# Patient Record
Sex: FEMALE | Race: BLACK OR AFRICAN AMERICAN | NOT HISPANIC OR LATINO | Employment: FULL TIME | URBAN - METROPOLITAN AREA
[De-identification: names, ages, dates, MRNs, and addresses within clinical notes are randomized per-mention and may not be internally consistent; named-entity substitution may affect disease eponyms.]

---

## 2017-01-26 ENCOUNTER — OFFICE VISIT (OUTPATIENT)
Dept: URGENT CARE | Facility: CLINIC | Age: 57
End: 2017-01-26

## 2017-01-26 ENCOUNTER — TRANSCRIBE ORDERS (OUTPATIENT)
Dept: URGENT CARE | Facility: CLINIC | Age: 57
End: 2017-01-26

## 2017-01-26 ENCOUNTER — APPOINTMENT (OUTPATIENT)
Dept: LAB | Facility: CLINIC | Age: 57
End: 2017-01-26
Attending: EMERGENCY MEDICINE

## 2017-01-26 DIAGNOSIS — R35.0 FREQUENCY OF MICTURITION: ICD-10-CM

## 2017-01-26 PROCEDURE — 87186 SC STD MICRODIL/AGAR DIL: CPT

## 2017-01-26 PROCEDURE — 87086 URINE CULTURE/COLONY COUNT: CPT

## 2017-01-26 PROCEDURE — 87077 CULTURE AEROBIC IDENTIFY: CPT

## 2017-01-26 PROCEDURE — 81002 URINALYSIS NONAUTO W/O SCOPE: CPT

## 2017-01-26 PROCEDURE — 99204 OFFICE O/P NEW MOD 45 MIN: CPT

## 2017-01-29 LAB — BACTERIA UR CULT: NORMAL

## 2019-04-15 ENCOUNTER — APPOINTMENT (EMERGENCY)
Dept: RADIOLOGY | Facility: HOSPITAL | Age: 59
End: 2019-04-15

## 2019-04-15 ENCOUNTER — APPOINTMENT (EMERGENCY)
Dept: CT IMAGING | Facility: HOSPITAL | Age: 59
End: 2019-04-15

## 2019-04-15 ENCOUNTER — HOSPITAL ENCOUNTER (EMERGENCY)
Facility: HOSPITAL | Age: 59
Discharge: HOME | End: 2019-04-15
Attending: EMERGENCY MEDICINE | Admitting: EMERGENCY MEDICINE

## 2019-04-15 VITALS
TEMPERATURE: 97.7 F | HEART RATE: 92 BPM | WEIGHT: 179 LBS | DIASTOLIC BLOOD PRESSURE: 77 MMHG | BODY MASS INDEX: 32.94 KG/M2 | SYSTOLIC BLOOD PRESSURE: 160 MMHG | OXYGEN SATURATION: 94 % | HEIGHT: 62 IN | RESPIRATION RATE: 19 BRPM
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
## 2019-04-15 DIAGNOSIS — S42.202A CLOSED FRACTURE OF LEFT PROXIMAL HUMERUS: ICD-10-CM

## 2019-04-15 PROCEDURE — 29125 APPL SHORT ARM SPLINT STATIC: CPT | Performed by: EMERGENCY MEDICINE

## 2019-04-15 PROCEDURE — 73060 X-RAY EXAM OF HUMERUS: CPT

## 2019-04-15 PROCEDURE — 99285 EMERGENCY DEPT VISIT HI MDM: CPT

## 2019-04-15 PROCEDURE — 96375 TX/PRO/DX INJ NEW DRUG ADDON: CPT

## 2019-04-15 PROCEDURE — 99284 EMERGENCY DEPT VISIT MOD MDM: CPT | Performed by: EMERGENCY MEDICINE

## 2019-04-15 PROCEDURE — 96374 THER/PROPH/DIAG INJ IV PUSH: CPT

## 2019-04-15 PROCEDURE — 71260 CT THORAX DX C+: CPT

## 2019-04-15 PROCEDURE — 72125 CT NECK SPINE W/O DYE: CPT

## 2019-04-15 RX ORDER — MORPHINE SULFATE 4 MG/ML
4 INJECTION, SOLUTION INTRAMUSCULAR; INTRAVENOUS ONCE
Status: COMPLETED | OUTPATIENT
Start: 2019-04-15 | End: 2019-04-15

## 2019-04-15 RX ORDER — FENTANYL CITRATE 50 UG/ML
50 INJECTION, SOLUTION INTRAMUSCULAR; INTRAVENOUS ONCE
Status: COMPLETED | OUTPATIENT
Start: 2019-04-15 | End: 2019-04-15

## 2019-04-15 RX ORDER — LIDOCAINE 50 MG/G
1 PATCH TOPICAL ONCE
Status: DISCONTINUED | OUTPATIENT
Start: 2019-04-15 | End: 2019-04-15 | Stop reason: HOSPADM

## 2019-04-15 RX ORDER — MORPHINE SULFATE 15 MG/1
15 TABLET ORAL EVERY 4 HOURS PRN
Qty: 20 TABLET | Refills: 0 | Status: SHIPPED | OUTPATIENT
Start: 2019-04-15 | End: 2019-04-20

## 2019-04-15 RX ADMIN — MORPHINE SULFATE 4 MG: 4 INJECTION INTRAVENOUS at 18:02

## 2019-04-15 RX ADMIN — FENTANYL CITRATE 50 MCG: 50 INJECTION INTRAMUSCULAR; INTRAVENOUS at 17:05

## 2019-04-15 RX ADMIN — LIDOCAINE 1 PATCH: 50 PATCH TOPICAL at 18:02

## 2019-04-15 RX ADMIN — IOHEXOL 85 ML: 350 INJECTION, SOLUTION INTRAVENOUS at 16:20

## 2019-04-16 ENCOUNTER — DOCUMENTATION (OUTPATIENT)
Dept: ICU | Facility: HOSPITAL | Age: 59
End: 2019-04-16

## 2020-05-21 ENCOUNTER — TELEPHONE (OUTPATIENT)
Dept: ADMINISTRATIVE | Facility: OTHER | Age: 60
End: 2020-05-21

## 2020-05-21 ENCOUNTER — OFFICE VISIT (OUTPATIENT)
Dept: FAMILY MEDICINE CLINIC | Facility: CLINIC | Age: 60
End: 2020-05-21

## 2020-05-21 VITALS
TEMPERATURE: 99.5 F | HEIGHT: 62 IN | OXYGEN SATURATION: 98 % | BODY MASS INDEX: 34.54 KG/M2 | SYSTOLIC BLOOD PRESSURE: 150 MMHG | WEIGHT: 187.7 LBS | DIASTOLIC BLOOD PRESSURE: 84 MMHG | HEART RATE: 101 BPM

## 2020-05-21 DIAGNOSIS — F41.8 OTHER SPECIFIED ANXIETY DISORDERS: ICD-10-CM

## 2020-05-21 DIAGNOSIS — I10 ESSENTIAL HYPERTENSION: ICD-10-CM

## 2020-05-21 DIAGNOSIS — Z13.31 DEPRESSION SCREENING NEGATIVE: ICD-10-CM

## 2020-05-21 DIAGNOSIS — Z12.39 SCREENING FOR BREAST CANCER: ICD-10-CM

## 2020-05-21 DIAGNOSIS — E66.09 CLASS 1 OBESITY DUE TO EXCESS CALORIES WITHOUT SERIOUS COMORBIDITY WITH BODY MASS INDEX (BMI) OF 34.0 TO 34.9 IN ADULT: ICD-10-CM

## 2020-05-21 DIAGNOSIS — Z13.0 SCREENING FOR DEFICIENCY ANEMIA: ICD-10-CM

## 2020-05-21 DIAGNOSIS — Z76.89 ENCOUNTER TO ESTABLISH CARE: Primary | ICD-10-CM

## 2020-05-21 DIAGNOSIS — Z13.1 SCREENING FOR DIABETES MELLITUS: ICD-10-CM

## 2020-05-21 DIAGNOSIS — Z13.29 SCREENING FOR THYROID DISORDER: ICD-10-CM

## 2020-05-21 DIAGNOSIS — Z13.220 SCREENING FOR HYPERLIPIDEMIA: ICD-10-CM

## 2020-05-21 PROBLEM — F41.9 ANXIETY AND DEPRESSION: Status: ACTIVE | Noted: 2020-05-21

## 2020-05-21 PROBLEM — F32.A ANXIETY AND DEPRESSION: Status: ACTIVE | Noted: 2020-05-21

## 2020-05-21 PROCEDURE — 3008F BODY MASS INDEX DOCD: CPT | Performed by: NURSE PRACTITIONER

## 2020-05-21 PROCEDURE — 1036F TOBACCO NON-USER: CPT | Performed by: NURSE PRACTITIONER

## 2020-05-21 PROCEDURE — 3079F DIAST BP 80-89 MM HG: CPT | Performed by: NURSE PRACTITIONER

## 2020-05-21 PROCEDURE — 3077F SYST BP >= 140 MM HG: CPT | Performed by: NURSE PRACTITIONER

## 2020-05-21 PROCEDURE — 99203 OFFICE O/P NEW LOW 30 MIN: CPT | Performed by: NURSE PRACTITIONER

## 2020-05-21 RX ORDER — ESCITALOPRAM OXALATE 10 MG/1
10 TABLET ORAL DAILY
Qty: 60 TABLET | Refills: 0 | Status: SHIPPED | OUTPATIENT
Start: 2020-05-21 | End: 2020-05-21

## 2020-05-21 RX ORDER — OLMESARTAN MEDOXOMIL 20 MG/1
20 TABLET ORAL DAILY
Qty: 30 TABLET | Refills: 0 | Status: SHIPPED | OUTPATIENT
Start: 2020-05-21 | End: 2020-06-19 | Stop reason: SDUPTHER

## 2020-05-21 RX ORDER — ESCITALOPRAM OXALATE 10 MG/1
10 TABLET ORAL DAILY
Qty: 60 TABLET | Refills: 0
Start: 2020-05-21 | End: 2020-06-02

## 2020-05-21 RX ORDER — DIPHENHYDRAMINE HCL 25 MG
25 CAPSULE ORAL EVERY 6 HOURS PRN
COMMUNITY

## 2020-06-02 ENCOUNTER — OFFICE VISIT (OUTPATIENT)
Dept: FAMILY MEDICINE CLINIC | Facility: CLINIC | Age: 60
End: 2020-06-02

## 2020-06-02 VITALS
WEIGHT: 189 LBS | SYSTOLIC BLOOD PRESSURE: 150 MMHG | TEMPERATURE: 99.7 F | OXYGEN SATURATION: 100 % | HEART RATE: 101 BPM | HEIGHT: 62 IN | DIASTOLIC BLOOD PRESSURE: 96 MMHG | BODY MASS INDEX: 34.78 KG/M2

## 2020-06-02 DIAGNOSIS — F41.8 OTHER SPECIFIED ANXIETY DISORDERS: ICD-10-CM

## 2020-06-02 PROCEDURE — 1036F TOBACCO NON-USER: CPT | Performed by: NURSE PRACTITIONER

## 2020-06-02 PROCEDURE — 3080F DIAST BP >= 90 MM HG: CPT | Performed by: NURSE PRACTITIONER

## 2020-06-02 PROCEDURE — 3008F BODY MASS INDEX DOCD: CPT | Performed by: NURSE PRACTITIONER

## 2020-06-02 PROCEDURE — 3077F SYST BP >= 140 MM HG: CPT | Performed by: NURSE PRACTITIONER

## 2020-06-02 PROCEDURE — 99213 OFFICE O/P EST LOW 20 MIN: CPT | Performed by: NURSE PRACTITIONER

## 2020-06-02 RX ORDER — BUSPIRONE HYDROCHLORIDE 5 MG/1
5 TABLET ORAL 2 TIMES DAILY
Qty: 60 TABLET | Refills: 0 | Status: SHIPPED | OUTPATIENT
Start: 2020-06-02 | End: 2020-06-19 | Stop reason: SDUPTHER

## 2020-06-08 ENCOUNTER — APPOINTMENT (OUTPATIENT)
Dept: LAB | Facility: CLINIC | Age: 60
End: 2020-06-08
Payer: COMMERCIAL

## 2020-06-08 DIAGNOSIS — Z13.29 SCREENING FOR THYROID DISORDER: ICD-10-CM

## 2020-06-08 DIAGNOSIS — Z13.220 SCREENING FOR HYPERLIPIDEMIA: ICD-10-CM

## 2020-06-08 DIAGNOSIS — Z13.1 SCREENING FOR DIABETES MELLITUS: ICD-10-CM

## 2020-06-08 DIAGNOSIS — Z13.0 SCREENING FOR DEFICIENCY ANEMIA: ICD-10-CM

## 2020-06-08 LAB
BASOPHILS # BLD AUTO: 0.01 THOUSANDS/ΜL (ref 0–0.1)
BASOPHILS NFR BLD AUTO: 0 % (ref 0–1)
EOSINOPHIL # BLD AUTO: 0.01 THOUSAND/ΜL (ref 0–0.61)
EOSINOPHIL NFR BLD AUTO: 0 % (ref 0–6)
ERYTHROCYTE [DISTWIDTH] IN BLOOD BY AUTOMATED COUNT: 15.4 % (ref 11.6–15.1)
HCT VFR BLD AUTO: 36 % (ref 34.8–46.1)
HGB BLD-MCNC: 11.6 G/DL (ref 11.5–15.4)
IMM GRANULOCYTES # BLD AUTO: 0.03 THOUSAND/UL (ref 0–0.2)
IMM GRANULOCYTES NFR BLD AUTO: 1 % (ref 0–2)
LYMPHOCYTES # BLD AUTO: 1.27 THOUSANDS/ΜL (ref 0.6–4.47)
LYMPHOCYTES NFR BLD AUTO: 26 % (ref 14–44)
MCH RBC QN AUTO: 31.2 PG (ref 26.8–34.3)
MCHC RBC AUTO-ENTMCNC: 32.2 G/DL (ref 31.4–37.4)
MCV RBC AUTO: 97 FL (ref 82–98)
MONOCYTES # BLD AUTO: 0.76 THOUSAND/ΜL (ref 0.17–1.22)
MONOCYTES NFR BLD AUTO: 16 % (ref 4–12)
NEUTROPHILS # BLD AUTO: 2.75 THOUSANDS/ΜL (ref 1.85–7.62)
NEUTS SEG NFR BLD AUTO: 57 % (ref 43–75)
NRBC BLD AUTO-RTO: 0 /100 WBCS
PLATELET # BLD AUTO: 329 THOUSANDS/UL (ref 149–390)
PMV BLD AUTO: 9.5 FL (ref 8.9–12.7)
RBC # BLD AUTO: 3.72 MILLION/UL (ref 3.81–5.12)
TSH SERPL DL<=0.05 MIU/L-ACNC: 3.13 UIU/ML (ref 0.36–3.74)
WBC # BLD AUTO: 4.83 THOUSAND/UL (ref 4.31–10.16)

## 2020-06-08 PROCEDURE — 36415 COLL VENOUS BLD VENIPUNCTURE: CPT

## 2020-06-08 PROCEDURE — 84443 ASSAY THYROID STIM HORMONE: CPT

## 2020-06-08 PROCEDURE — 85025 COMPLETE CBC W/AUTO DIFF WBC: CPT

## 2020-06-09 ENCOUNTER — APPOINTMENT (OUTPATIENT)
Dept: LAB | Facility: CLINIC | Age: 60
End: 2020-06-09
Payer: COMMERCIAL

## 2020-06-09 LAB
ALBUMIN SERPL BCP-MCNC: 3.7 G/DL (ref 3.5–5)
ALP SERPL-CCNC: 86 U/L (ref 46–116)
ALT SERPL W P-5'-P-CCNC: 24 U/L (ref 12–78)
ANION GAP SERPL CALCULATED.3IONS-SCNC: 13 MMOL/L (ref 4–13)
AST SERPL W P-5'-P-CCNC: 19 U/L (ref 5–45)
BILIRUB SERPL-MCNC: 0.33 MG/DL (ref 0.2–1)
BUN SERPL-MCNC: 56 MG/DL (ref 5–25)
CALCIUM SERPL-MCNC: 9 MG/DL (ref 8.3–10.1)
CHLORIDE SERPL-SCNC: 102 MMOL/L (ref 100–108)
CHOLEST SERPL-MCNC: 204 MG/DL (ref 50–200)
CO2 SERPL-SCNC: 12 MMOL/L (ref 21–32)
CREAT SERPL-MCNC: 3.67 MG/DL (ref 0.6–1.3)
GFR SERPL CREATININE-BSD FRML MDRD: 15 ML/MIN/1.73SQ M
GLUCOSE P FAST SERPL-MCNC: 135 MG/DL (ref 65–99)
HDLC SERPL-MCNC: 68 MG/DL
LDLC SERPL CALC-MCNC: 98 MG/DL (ref 0–100)
POTASSIUM SERPL-SCNC: 3.4 MMOL/L (ref 3.5–5.3)
PROT SERPL-MCNC: 8.3 G/DL (ref 6.4–8.2)
SODIUM SERPL-SCNC: 127 MMOL/L (ref 136–145)
TRIGL SERPL-MCNC: 190 MG/DL

## 2020-06-09 PROCEDURE — 36415 COLL VENOUS BLD VENIPUNCTURE: CPT

## 2020-06-09 PROCEDURE — 80053 COMPREHEN METABOLIC PANEL: CPT

## 2020-06-09 PROCEDURE — 80061 LIPID PANEL: CPT

## 2020-06-13 DIAGNOSIS — I10 ESSENTIAL HYPERTENSION: ICD-10-CM

## 2020-06-15 RX ORDER — OLMESARTAN MEDOXOMIL 20 MG/1
TABLET ORAL
Qty: 30 TABLET | Refills: 0 | OUTPATIENT
Start: 2020-06-15

## 2020-06-16 DIAGNOSIS — F41.8 OTHER SPECIFIED ANXIETY DISORDERS: ICD-10-CM

## 2020-06-17 DIAGNOSIS — F41.8 OTHER SPECIFIED ANXIETY DISORDERS: ICD-10-CM

## 2020-06-17 RX ORDER — BUSPIRONE HYDROCHLORIDE 5 MG/1
TABLET ORAL
Qty: 180 TABLET | Refills: 1 | OUTPATIENT
Start: 2020-06-17

## 2020-06-19 ENCOUNTER — OFFICE VISIT (OUTPATIENT)
Dept: FAMILY MEDICINE CLINIC | Facility: CLINIC | Age: 60
End: 2020-06-19

## 2020-06-19 VITALS
SYSTOLIC BLOOD PRESSURE: 138 MMHG | OXYGEN SATURATION: 98 % | TEMPERATURE: 99 F | DIASTOLIC BLOOD PRESSURE: 90 MMHG | HEIGHT: 62 IN | WEIGHT: 185 LBS | HEART RATE: 80 BPM | BODY MASS INDEX: 34.04 KG/M2

## 2020-06-19 DIAGNOSIS — I10 ESSENTIAL HYPERTENSION: Primary | ICD-10-CM

## 2020-06-19 DIAGNOSIS — N18.4 KIDNEY DISEASE, CHRONIC, STAGE IV (SEVERE, EGFR 15-29 ML/MIN) (HCC): ICD-10-CM

## 2020-06-19 DIAGNOSIS — F41.9 ANXIETY AND DEPRESSION: ICD-10-CM

## 2020-06-19 DIAGNOSIS — R79.89 HIGH SERUM CREATINE: ICD-10-CM

## 2020-06-19 DIAGNOSIS — R79.9 ELEVATED BUN: ICD-10-CM

## 2020-06-19 DIAGNOSIS — R73.03 PREDIABETES: ICD-10-CM

## 2020-06-19 DIAGNOSIS — R94.4 DECREASED GLOMERULAR FILTRATION RATE (GFR): ICD-10-CM

## 2020-06-19 DIAGNOSIS — I10 ESSENTIAL HYPERTENSION: ICD-10-CM

## 2020-06-19 DIAGNOSIS — F41.8 OTHER SPECIFIED ANXIETY DISORDERS: ICD-10-CM

## 2020-06-19 DIAGNOSIS — R73.01 ELEVATED FASTING GLUCOSE: ICD-10-CM

## 2020-06-19 DIAGNOSIS — F32.A ANXIETY AND DEPRESSION: ICD-10-CM

## 2020-06-19 LAB — SL AMB POCT HEMOGLOBIN AIC: 6 (ref ?–6.5)

## 2020-06-19 PROCEDURE — 3075F SYST BP GE 130 - 139MM HG: CPT | Performed by: NURSE PRACTITIONER

## 2020-06-19 PROCEDURE — 3080F DIAST BP >= 90 MM HG: CPT | Performed by: NURSE PRACTITIONER

## 2020-06-19 PROCEDURE — 99213 OFFICE O/P EST LOW 20 MIN: CPT | Performed by: NURSE PRACTITIONER

## 2020-06-19 PROCEDURE — 1036F TOBACCO NON-USER: CPT | Performed by: NURSE PRACTITIONER

## 2020-06-19 PROCEDURE — 83036 HEMOGLOBIN GLYCOSYLATED A1C: CPT | Performed by: NURSE PRACTITIONER

## 2020-06-19 PROCEDURE — 3008F BODY MASS INDEX DOCD: CPT | Performed by: NURSE PRACTITIONER

## 2020-06-19 RX ORDER — OLMESARTAN MEDOXOMIL 20 MG/1
20 TABLET ORAL DAILY
Qty: 90 TABLET | Refills: 1 | Status: SHIPPED | OUTPATIENT
Start: 2020-06-19 | End: 2020-06-19 | Stop reason: SDUPTHER

## 2020-06-19 RX ORDER — BUSPIRONE HYDROCHLORIDE 5 MG/1
5 TABLET ORAL 2 TIMES DAILY
Qty: 60 TABLET | Refills: 0 | Status: SHIPPED | OUTPATIENT
Start: 2020-06-19 | End: 2020-07-27 | Stop reason: SDUPTHER

## 2020-06-19 RX ORDER — OLMESARTAN MEDOXOMIL 20 MG/1
20 TABLET ORAL DAILY
Qty: 90 TABLET | Refills: 1 | Status: SHIPPED | OUTPATIENT
Start: 2020-06-19 | End: 2020-08-28 | Stop reason: SDUPTHER

## 2020-06-24 ENCOUNTER — TELEPHONE (OUTPATIENT)
Dept: FAMILY MEDICINE CLINIC | Facility: CLINIC | Age: 60
End: 2020-06-24

## 2020-07-13 DIAGNOSIS — F41.8 OTHER SPECIFIED ANXIETY DISORDERS: ICD-10-CM

## 2020-07-13 RX ORDER — ESCITALOPRAM OXALATE 10 MG/1
TABLET ORAL
Qty: 60 TABLET | Refills: 0 | OUTPATIENT
Start: 2020-07-13

## 2020-07-15 ENCOUNTER — TRANSCRIBE ORDERS (OUTPATIENT)
Dept: LAB | Facility: CLINIC | Age: 60
End: 2020-07-15

## 2020-07-15 ENCOUNTER — APPOINTMENT (OUTPATIENT)
Dept: LAB | Facility: CLINIC | Age: 60
End: 2020-07-15
Payer: COMMERCIAL

## 2020-07-15 DIAGNOSIS — R79.89 HIGH SERUM CREATINE: ICD-10-CM

## 2020-07-15 DIAGNOSIS — R79.9 ELEVATED BUN: ICD-10-CM

## 2020-07-15 DIAGNOSIS — R94.4 DECREASED GLOMERULAR FILTRATION RATE (GFR): ICD-10-CM

## 2020-07-15 LAB
ALBUMIN SERPL BCP-MCNC: 4 G/DL (ref 3.5–5)
ALP SERPL-CCNC: 99 U/L (ref 46–116)
ALT SERPL W P-5'-P-CCNC: 34 U/L (ref 12–78)
ANION GAP SERPL CALCULATED.3IONS-SCNC: 7 MMOL/L (ref 4–13)
AST SERPL W P-5'-P-CCNC: 102 U/L (ref 5–45)
BILIRUB SERPL-MCNC: 0.8 MG/DL (ref 0.2–1)
BUN SERPL-MCNC: 9 MG/DL (ref 5–25)
CALCIUM SERPL-MCNC: 8.7 MG/DL (ref 8.3–10.1)
CHLORIDE SERPL-SCNC: 105 MMOL/L (ref 100–108)
CO2 SERPL-SCNC: 28 MMOL/L (ref 21–32)
CREAT SERPL-MCNC: 0.8 MG/DL (ref 0.6–1.3)
CREAT UR-MCNC: 849 MG/DL
GFR SERPL CREATININE-BSD FRML MDRD: 94 ML/MIN/1.73SQ M
GLUCOSE P FAST SERPL-MCNC: 165 MG/DL (ref 65–99)
MICROALBUMIN UR-MCNC: 308 MG/L (ref 0–20)
MICROALBUMIN/CREAT 24H UR: 36 MG/G CREATININE (ref 0–30)
POTASSIUM SERPL-SCNC: 3.6 MMOL/L (ref 3.5–5.3)
PROT SERPL-MCNC: 7.6 G/DL (ref 6.4–8.2)
SODIUM SERPL-SCNC: 140 MMOL/L (ref 136–145)

## 2020-07-15 PROCEDURE — 82570 ASSAY OF URINE CREATININE: CPT | Performed by: NURSE PRACTITIONER

## 2020-07-15 PROCEDURE — 36415 COLL VENOUS BLD VENIPUNCTURE: CPT

## 2020-07-15 PROCEDURE — 80053 COMPREHEN METABOLIC PANEL: CPT

## 2020-07-15 PROCEDURE — 82043 UR ALBUMIN QUANTITATIVE: CPT | Performed by: NURSE PRACTITIONER

## 2020-07-27 DIAGNOSIS — F41.8 OTHER SPECIFIED ANXIETY DISORDERS: ICD-10-CM

## 2020-07-27 RX ORDER — BUSPIRONE HYDROCHLORIDE 5 MG/1
5 TABLET ORAL 2 TIMES DAILY
Qty: 60 TABLET | Refills: 2 | Status: SHIPPED | OUTPATIENT
Start: 2020-07-27 | End: 2020-09-18 | Stop reason: ALTCHOICE

## 2020-08-07 ENCOUNTER — OFFICE VISIT (OUTPATIENT)
Dept: FAMILY MEDICINE CLINIC | Facility: CLINIC | Age: 60
End: 2020-08-07
Payer: COMMERCIAL

## 2020-08-07 VITALS
DIASTOLIC BLOOD PRESSURE: 84 MMHG | BODY MASS INDEX: 32.83 KG/M2 | HEART RATE: 114 BPM | WEIGHT: 178.4 LBS | HEIGHT: 62 IN | SYSTOLIC BLOOD PRESSURE: 180 MMHG | OXYGEN SATURATION: 99 % | TEMPERATURE: 100.4 F

## 2020-08-07 DIAGNOSIS — F41.9 ANXIETY AND DEPRESSION: ICD-10-CM

## 2020-08-07 DIAGNOSIS — I10 ESSENTIAL HYPERTENSION: Primary | ICD-10-CM

## 2020-08-07 DIAGNOSIS — E66.09 CLASS 1 OBESITY DUE TO EXCESS CALORIES WITHOUT SERIOUS COMORBIDITY WITH BODY MASS INDEX (BMI) OF 34.0 TO 34.9 IN ADULT: ICD-10-CM

## 2020-08-07 DIAGNOSIS — F32.A ANXIETY AND DEPRESSION: ICD-10-CM

## 2020-08-07 PROCEDURE — 99213 OFFICE O/P EST LOW 20 MIN: CPT | Performed by: NURSE PRACTITIONER

## 2020-08-07 PROCEDURE — 3077F SYST BP >= 140 MM HG: CPT | Performed by: NURSE PRACTITIONER

## 2020-08-07 PROCEDURE — 3079F DIAST BP 80-89 MM HG: CPT | Performed by: NURSE PRACTITIONER

## 2020-08-07 PROCEDURE — 3008F BODY MASS INDEX DOCD: CPT | Performed by: NURSE PRACTITIONER

## 2020-08-07 PROCEDURE — 1036F TOBACCO NON-USER: CPT | Performed by: NURSE PRACTITIONER

## 2020-08-07 RX ORDER — AMLODIPINE BESYLATE 5 MG/1
5 TABLET ORAL DAILY
Qty: 30 TABLET | Refills: 0 | Status: SHIPPED | OUTPATIENT
Start: 2020-08-07 | End: 2020-08-28 | Stop reason: SINTOL

## 2020-08-07 NOTE — PATIENT INSTRUCTIONS
Blood Pressure Diary  Judi Tobar Fri Sat  COMMENTS                                                                            Keep a log of your blood pressure readings at home  Please take blood pressure at same time of day  Please record date and time blood pressure was taken  Make sure to take your blood pressure when you are seated and resting for about 3 minutes  You can send these to me via [employer] or by calling the office  This will help me manage your blood pressure better  Check blood pressures at home and keep a log  Bring log to your follow up or call if the average home BP is greater than 878 systolic and or 90 diastolic before your follow up  Hypertension   AMBULATORY CARE:   Hypertension  is high blood pressure (BP)  Your BP is the force of your blood moving against the walls of your arteries  Normal BP is less than 120/80  Prehypertension is between 120/80 and 139/89  Hypertension is 140/90 or higher  Hypertension causes your BP to get so high that your heart has to work much harder than normal  This can damage your heart  You can control hypertension with a healthy lifestyle or medicines  A controlled blood pressure helps protect your organs, such as your heart, lungs, brain, and kidneys  Common symptoms include the following:   · Headache     · Blurred vision     · Chest pain     · Dizziness or weakness     · Trouble breathing    · Nosebleeds  Call 911 for any of the following:   · You have discomfort in your chest that feels like squeezing, pressure, fullness, or pain  · You become confused or have difficulty speaking  · You suddenly feel lightheaded or have trouble breathing  · You have pain or discomfort in your back, neck, jaw, stomach, or arm  Seek care immediately if:   · You have a severe headache or vision loss  · You have weakness in an arm or leg  Contact your healthcare provider if:   · You feel faint, dizzy, confused, or drowsy      · You have been taking your BP medicine and your BP is still higher than your healthcare provider says it should be  · You have questions or concerns about your condition or care  Treatment for hypertension  may include medicine to lower your BP and lower your cholesterol level  A low cholesterol level helps prevent heart disease and makes it easier to control your blood pressure  You may also need to make lifestyle changes  Take your medicine exactly as directed  Manage hypertension:  Talk with your healthcare provider about these and other ways to manage hypertension:  · Check your BP at home  Sit and rest for 5 minutes before you take your BP  Extend your arm and support it on a flat surface  Your arm should be at the same level as your heart  Follow the directions that came with your BP monitor  If possible, take at least 2 BP readings each time  Take your BP at least twice a day at the same times each day, such as morning and evening  Keep a record of your BP readings and bring it to your follow-up visits  Ask your healthcare provider what your BP should be  · Limit sodium (salt) as directed  Too much sodium can affect your fluid balance  Check labels to find low-sodium or no-salt-added foods  Some low-sodium foods use potassium salts for flavor  Too much potassium can also cause health problems  Your healthcare provider will tell you how much sodium and potassium are safe for you to have in a day  He or she may recommend that you limit sodium to 2,300 mg a day  · Follow the meal plan recommended by your healthcare provider  A dietitian or your provider can give you more information on low-sodium plans or the DASH (Dietary Approaches to Stop Hypertension) eating plan  The DASH plan is low in sodium, unhealthy fats, and total fat  It is high in potassium, calcium, and fiber  · Exercise to maintain a healthy weight  Exercise at least 30 minutes per day, on most days of the week  This will help decrease your blood pressure  Ask your healthcare provider about the best exercise plan for you  · Decrease stress  This may help lower your BP  Learn ways to relax, such as deep breathing or listening to music  · Limit alcohol  Women should limit alcohol to 1 drink a day  Men should limit alcohol to 2 drinks a day  A drink of alcohol is 12 ounces of beer, 5 ounces of wine, or 1½ ounces of liquor  · Do not smoke  Nicotine and other chemicals in cigarettes and cigars can increase your BP and also cause lung damage  Ask your healthcare provider for information if you currently smoke and need help to quit  E-cigarettes or smokeless tobacco still contain nicotine  Talk to your healthcare provider before you use these products  · Manage any other health conditions you have  Health conditions such as diabetes can increase your risk for hypertension  Follow your healthcare provider's instructions and take all your medicines as directed  Follow up with your healthcare provider as directed: You will need to return to have your BP checked and to have other lab tests done  Write down your questions so you remember to ask them during your visits  © 2017 [facility] Information is for End User's use only and may not be sold, redistributed or otherwise used for commercial purposes  All illustrations and images included in CareNotes® are the copyrighted property of A D A M [employer] Inc  or Marty Martell  The above information is an  only  It is not intended as medical advice for individual conditions or treatments  Talk to your doctor, nurse or pharmacist before following any medical regimen to see if it is safe and effective for you  DASH Eating Plan   AMBULATORY CARE:   The DASH (Dietary Approaches to Stop Hypertension) Eating Plan  is designed to help prevent or lower high blood pressure   It can also help to lower LDL (bad) cholesterol and decrease your risk for heart disease  The plan is low in sodium, sugar, unhealthy fats, and total fat  It is high in potassium, calcium, magnesium, and fiber  These nutrients are added when you eat more fruits, vegetables, and whole grains  Your sodium limit each day: Your dietitian will tell you how much sodium is safe for you to have each day  People with high blood pressure should have no more than 1,500 to 2,300 mg of sodium in a day  A teaspoon (tsp) of salt has 2,300 mg of sodium  This may seem like a difficult goal, but small changes to the foods you eat can make a big difference  Your healthcare provider or dietitian can help you create a meal plan that follows your sodium limit  How to limit sodium:   · Read food labels  Food labels can help you choose foods that are low in sodium  The amount of sodium is listed in milligrams (mg)  The % Daily Value (DV) column tells you how much of your daily needs are met by 1 serving of the food for each nutrient listed  Choose foods that have less than 5% of the DV of sodium  These foods are considered low in sodium  Foods that have 20% or more of the DV of sodium are considered high in sodium  Avoid foods that have more than 300 mg of sodium in each serving  Choose foods that say low-sodium, reduced-sodium, or no salt added on the food label  · Avoid salt  Do not salt food at the table, and add very little salt to foods during cooking  Use herbs and spices, such as onions, garlic, and salt-free seasonings to add flavor to foods  Try lemon or lime juice or vinegar to give foods a tart flavor  Use hot peppers or a small amount of hot pepper sauce to add a spicy flavor to foods  · Ask about salt substitutes  Ask your healthcare provider if you may use salt substitutes  Some salt substitutes have ingredients that can be harmful if you have certain health conditions  · Choose foods carefully at restaurants    Meals from restaurants, especially fast food restaurants, are often high in sodium  Some restaurants have nutrition information that tells you the amount of sodium in their foods  Ask to have your food prepared with less, or no salt  What you need to know about fats:   · Include healthy fats  Examples are unsaturated fats and omega-3 fatty acids  Unsaturated fats are found in soybean, canola, olive, or sunflower oil, and liquid and soft tub margarines  Omega-3 fatty acids are found in fatty fish, such as salmon, tuna, mackerel, and sardines  It is also found in flaxseed oil and ground flaxseed  · Avoid unhealthy fats  Do not eat unhealthy fats, such as saturated fats and trans fats  Saturated fats are found in foods that contain fat from animals  Examples are fatty meats, whole milk, butter, cream, and other dairy foods  It is also found in shortening, stick margarine, palm oil, and coconut oil  Trans fats are found in fried foods, crackers, chips, and baked goods made with margarine or shortening  Foods to include: With the DASH eating plan, you need to eat a certain number of servings from each food group  This will help you get enough of certain nutrients and limit others  The amount of servings you should eat depends on how many calories you need  Your dietitian can tell you how many calories you need  The number of servings listed next to the food groups below are for people who need about 2,000 calories each day    · Grains:  6 to 8 servings (3 of these servings should be whole-grain foods)    ¨ 1 slice of whole-grain bread     ¨ 1 ounce of dry cereal    ¨ ½ cup of cooked cereal, pasta, or brown rice    · Vegetables and fruits:  4 to 5 servings of fruits and 4 to 5 servings of vegetables    ¨ 1 medium fruit    ¨ ½ cup of frozen, canned (no added salt), or chopped fresh vegetables     ¨ ½ cup of fresh, frozen, dried, or canned fruit (canned in light syrup or fruit juice)    ¨ ½ cup of vegetable or fruit juice    · Dairy:  2 to 3 servings    ¨ 1 cup of nonfat (skim) or 1% milk    ¨ 1½ ounces of fat-free or low-fat cheese    ¨ 6 ounces of nonfat or low-fat yogurt    · Lean meat, poultry, and fish:  6 ounces or less    [employer] (chicken, turkey) with no skin    ¨ Fish (especially fatty fish, such as salmon, fresh tuna, or mackerel)    ¨ Lean beef and pork (loin, round, extra lean hamburger)    ¨ Egg whites and egg substitutes    · Nuts, seeds, and legumes:  4 to 5 servings each week    ¨ ½ cup of cooked beans and peas    ¨ 1½ ounces of unsalted nuts    ¨ 2 tablespoons of peanut butter or seeds    · Sweets and added sugars:  5 or less each week    ¨ 1 tablespoon of sugar, jelly, or jam    ¨ ½ cup of sorbet or gelatin    ¨ 1 cup of lemonade    · Fats:  2 to 3 servings each week    ¨ 1 teaspoon of soft margarine or vegetable oil    ¨ 1 tablespoon of mayonnaise    ¨ 2 tablespoons of salad dressing  Foods to avoid:   · Grains:      [employer], such as doughnuts, pastries, cookies, and biscuits (high in fat and sugar)    ¨ Mixes for cornbread and biscuits, packaged foods, such as bread stuffing, rice and pasta mixes, macaroni and cheese, and instant cereals (high in sodium)    · Fruits and vegetables:      ¨ Regular, canned vegetables (high in sodium)    ¨ Sauerkraut, pickled vegetables, and other foods prepared in brine (high in sodium)    ¨ Fried vegetables or vegetables in butter or high-fat sauces    ¨ Fruit in cream or butter sauce (high in fat)    · Dairy:      ¨ Whole milk, 2% milk, and cream (high in fat)    ¨ Regular cheese and processed cheese (high in fat and sodium)    · Meats and protein foods:      ¨ Smoked or cured meat, such as corned beef, clement, ham, hot dogs, and sausage (high in fat and sodium)    ¨ Canned beans and canned meats or spreads, such as potted meats, sardines, anchovies, and imitation seafood (high in sodium)    ¨ Deli or lunch meats, such as bologna, ham, turkey, and roast beef (high in sodium)    ¨ High-fat meat (T-bone steak, regular hamburger, and ribs)    ¨ Whole eggs and egg yolks (high in fat)    · Other:      ¨ Seasonings made with salt, such as garlic salt, celery salt, onion salt, seasoned salt, meat tenderizers, and monosodium glutamate (MSG)    ¨ Miso soup and canned or dried soup mixes (high in sodium)    ¨ Regular soy sauce, barbecue sauce, teriyaki sauce, steak sauce, Worcestershire sauce, and most flavored vinegars (high in sodium)    ¨ Regular condiments, such as mustard, ketchup, and salad dressings (high in sodium)    ¨ Gravy and sauces, such as Leon or cheese sauces (high in sodium and fat)    ¨ Drinks high in sugar, such as soda or fruit drinks    [employer] foods, such as salted chips, popcorn, pretzels, pork rinds, salted crackers, and salted nuts    ¨ Frozen foods, such as dinners, entrees, vegetables with sauces, and breaded meats (high in sodium)  Other guidelines to follow:   · Maintain a healthy weight  Your risk for heart disease is higher if you are overweight  Your healthcare provider may suggest that you lose weight if you are overweight  You can lose weight by eating fewer calories and foods that have added sugars and fat  The DASH meal plan can help you do this  Decrease calories by eating smaller portions at each meal and fewer snacks  Ask your healthcare provider for more information about how to lose weight  · Exercise regularly  Regular exercise can help you reach or maintain a healthy weight  Regular exercise can also help decrease your blood pressure and improve your cholesterol levels  Get 30 minutes or more of moderate exercise each day of the week  To lose weight, get at least 60 minutes of exercise  Talk to your healthcare provider about the best exercise program for you  · Limit alcohol  Women should limit alcohol to 1 drink a day  Men should limit alcohol to 2 drinks a day  A drink of alcohol is 12 ounces of beer, 5 ounces of wine, or 1½ ounces of liquor    © 2017 [employer] [address] [place] [address] is for End User's use only and may not be sold, redistributed or otherwise used for commercial purposes  All illustrations and images included in CareNotes® are the copyrighted property of A D A M [employer] Inc  or Marty Martell  The above information is an  only  It is not intended as medical advice for individual conditions or treatments  Talk to your doctor, nurse or pharmacist before following any medical regimen to see if it is safe and effective for you  Low-Sodium Diet   AMBULATORY CARE:   A low-sodium diet  limits foods that are high in sodium (salt)  You will need to follow a low-sodium diet if you have high blood pressure, kidney disease, or heart failure  You may also need to follow this diet if you have a condition that is causing your body to retain (hold) extra fluid  You may need to limit the amount of sodium you eat to 1,500 mg  Ask your healthcare provider how much sodium you can have each day  How to use food labels to choose foods that are low in sodium:  Read food labels to find the amount of sodium they contain  The amount of sodium is listed in milligrams (mg)  The % Daily Value (DV) column tells you how much of your daily needs are met by 1 serving of the food for each nutrient listed  Choose foods that have less than 5% of the DV of sodium  These foods are considered low in sodium  Foods that have 20% or more of the DV of sodium are considered high in sodium  Some food labels may also list any of the following terms that tell you about the sodium content in the food:  · Sodium-free:  Less than 5 mg in each serving    · Very low sodium:  35 mg of sodium or less in each serving    · Low sodium:  140 mg of sodium or less in each serving    · Reduced sodium:   At least 25% less sodium in each serving than the regular type    · Light in sodium:  50% less sodium in each serving    · Unsalted or no added salt:  No extra salt is added during processing (the food may still contain sodium)  Foods to avoid:  Salty foods are high in sodium  You should avoid the following:  · Processed foods:      ¨ Mixes for cornbread, biscuits, cake, and pudding     ¨ Instant foods, such as potatoes, cereals, noodles, and rice     ¨ Packaged foods, such as bread stuffing, rice and pasta mixes, snack dip mixes, and macaroni and cheese     ¨ Canned foods, such as canned vegetables, soups, broths, sauces, and vegetable or tomato juice    ¨ Snack foods, such as salted chips, popcorn, pretzels, pork rinds, salted crackers, and salted nuts    ¨ Frozen foods, such as dinners, entrees, vegetables with sauces, and breaded meats    ¨ Sauerkraut, pickled vegetables, and other foods prepared in brine    · Meats and cheeses:      ¨ Smoked or cured meat, such as corned beef, clement, ham, hot dogs, and sausage    ¨ Canned meats or spreads, such as potted meats, sardines, anchovies, and imitation seafood    ¨ Deli or lunch meats, such as bologna, ham, turkey, and roast beef    ¨ Processed cheese, such as American cheese and cheese spreads    · Condiments, sauces, and seasonings:      ¨ Salt (¼ teaspoon of salt contains 575 mg of sodium)    ¨ Seasonings made with salt, such as garlic salt, celery salt, onion salt, and seasoned salt    ¨ Regular soy sauce, barbecue sauce, teriyaki sauce, steak sauce, Worcestershire sauce, and most flavored vinegars    ¨ Canned gravy and mixes     ¨ Regular condiments, such as mustard, ketchup, and salad dressings    ¨ Pickles and olives    ¨ Meat tenderizers and monosodium glutamate (MSG)  Foods to include:  Read the food label to find the exact amount of sodium in each serving  · Bread and cereal:  Try to choose breads with less than 80 mg of sodium per serving  ¨ Bread, roll, patrick, tortilla, or unsalted crackers      ¨ Ready-to-eat cereals with less than 5% DV of sodium (examples include shredded wheat and puffed rice)    ¨ Pasta    · Vegetables and fruits: ¨ Unsalted fresh, frozen, or canned vegetables    ¨ Fresh, frozen, or canned fruits    ¨ Fruit juice    · Dairy:  One serving has about 150 mg of sodium  ¨ Milk, all types    ¨ Yogurt    ¨ Hard cheese, such as cheddar, Swiss, [employer], or mozzarella    · Meat and other protein foods:  Some raw meats may have added sodium  ¨ Plain meats, fish, and poultry     ¨ Eggs    · Other foods:      ¨ Homemade pudding    ¨ Unsalted nuts, popcorn, or pretzels    ¨ Unsalted butter or margarine  Ways to decrease sodium:   · Add spices and herbs to foods instead of salt during cooking  Use salt-free seasonings to add flavor to foods  Examples include onion powder, garlic powder, basil, lundy powder, paprika, and parsley  Try lemon or lime juice or vinegar to give foods a tart flavor  Use hot peppers, pepper, or cayenne pepper to add a spicy flavor to foods  · Do not keep a salt shaker at your kitchen table  This may help keep you from adding salt to food at the table  It may take time to get used to enjoying the natural flavor of food instead of adding salt  Talk to your healthcare provider before you use salt substitutes  Some salt substitutes have a high amount of potassium and need to be avoided if you have kidney disease  · Choose low-sodium foods at restaurants  Meals from restaurants are often high in sodium  Some restaurants have nutrition information on the menu that tells you the amount of sodium in their foods  If possible, ask for your food to be prepared with less, or no salt  · Shop for unsalted or low-sodium foods and snacks at the grocery store  Examples include unsalted or low-sodium broths, soups, and canned vegetables  Choose fresh or frozen vegetables instead  Choose unsalted nuts or seeds or fresh fruits or vegetables as snacks  Read food labels and choose salt-free, very low-sodium, or low-sodium foods    © 2017 [employer] Information is for End User's use only and may not be sold, redistributed or otherwise used for commercial purposes  All illustrations and images included in CareNotes® are the copyrighted property of A D A M [employer] Inc  or Marty Martell  The above information is an  only  It is not intended as medical advice for individual conditions or treatments  Talk to your doctor, nurse or pharmacist before following any medical regimen to see if it is safe and effective for you

## 2020-08-07 NOTE — PROGRESS NOTES
Assessment/Plan:    Problem List Items Addressed This Visit     Class 1 obesity due to excess calories without serious comorbidity with body mass index (BMI) of 34 0 to 34 9 in adult    Essential hypertension - Primary    Relevant Medications    amLODIPine (NORVASC) 5 mg tablet    Anxiety and depression           Diagnoses and all orders for this visit:    Essential hypertension  Comments:  rechecked BP - is accurate to MA reading  Add CCB, F/U 3 weeks  Orders:  -     amLODIPine (NORVASC) 5 mg tablet; Take 1 tablet (5 mg total) by mouth daily    Anxiety and depression    Class 1 obesity due to excess calories without serious comorbidity with body mass index (BMI) of 34 0 to 34 9 in adult        No problem-specific Assessment & Plan notes found for this encounter  Subjective:      Patient ID: Robyne Dandy is a 64 y o  female  Robyne Dandy presents for anxiety and HTN F/U    Anxiety   Presents for follow-up visit  Symptoms include muscle tension, nervous/anxious behavior and panic  Patient reports no chest pain, compulsions, confusion, decreased concentration, depressed mood, dizziness, dry mouth, excessive worry, feeling of choking, hyperventilation, impotence, insomnia, irritability, malaise, nausea, obsessions, palpitations, restlessness, shortness of breath or suicidal ideas  Symptoms occur most days  The severity of symptoms is moderate  The quality of sleep is fair  Nighttime awakenings: occasional      Compliance with medications is %  Hypertension   This is a chronic problem  The current episode started more than 1 year ago  The problem is uncontrolled  Associated symptoms include anxiety  Pertinent negatives include no blurred vision, chest pain, headaches, malaise/fatigue, neck pain, orthopnea, palpitations, peripheral edema, PND, shortness of breath or sweats  There are no associated agents to hypertension   Risk factors for coronary artery disease include stress, obesity and post-menopausal state (Pre DM)  Past treatments include angiotensin blockers and calcium channel blockers (start today)  Compliance problems include exercise  There is no history of angina, kidney disease, CAD/MI, CVA, heart failure, left ventricular hypertrophy, PVD or retinopathy  There is no history of chronic renal disease, a hypertension causing med, sleep apnea or a thyroid problem  The following portions of the patient's history were reviewed and updated as appropriate:   She has a past medical history of Anxiety, Heart attack ([facility] [address] ) (), History of blood transfusion (), History of depression, and Hypertension  ,  does not have any pertinent problems on file  ,   has a past surgical history that includes Gastric bypass; Oophorectomy (Right); Cholecystectomy; Breast surgery (Right, ); and  section (, )  ,  family history includes Diabetes in her brother, mother, and sister; Heart attack in her father; Substance Abuse in her brother  ,   reports that she has never smoked  She has never used smokeless tobacco  She reports current alcohol use  She reports previous drug use ,  is allergic to nsaids; oxycodone-acetaminophen; propoxyphene; and shellfish allergy     Current Outpatient Medications   Medication Sig Dispense Refill    busPIRone (BUSPAR) 5 mg tablet Take 1 tablet (5 mg total) by mouth 2 (two) times a day 60 tablet 2    diphenhydrAMINE (Benadryl Allergy) 25 mg capsule Take 25 mg by mouth every 6 (six) hours as needed for itching      olmesartan (BENICAR) 20 mg tablet Take 1 tablet (20 mg total) by mouth daily 90 tablet 1    amLODIPine (NORVASC) 5 mg tablet Take 1 tablet (5 mg total) by mouth daily 30 tablet 0     No current facility-administered medications for this visit  Review of Systems   Constitutional: Negative  Negative for irritability and malaise/fatigue  HENT: Negative  Eyes: Negative  Negative for blurred vision  Respiratory: Negative  Negative for shortness of breath  Cardiovascular: Negative  Negative for chest pain, palpitations, orthopnea and PND  Gastrointestinal: Negative  Negative for nausea  Endocrine: Negative  Genitourinary: Negative  Negative for impotence  Musculoskeletal: Negative  Negative for neck pain  Skin: Negative  Allergic/Immunologic: Negative  Neurological: Negative  Negative for dizziness and headaches  Hematological: Negative  Psychiatric/Behavioral: Negative for confusion, decreased concentration and suicidal ideas  The patient is nervous/anxious  The patient does not have insomnia  Objective:  Vitals:    08/07/20 1039   BP: (!) 180/84   BP Location: Left arm   Patient Position: Sitting   Cuff Size: Large   Pulse: (!) 114   Temp: 100 4 °F (38 °C)   TempSrc: Tympanic   SpO2: 99%   Weight: 80 9 kg (178 lb 6 4 oz)   Height: 5' 1 5" (1 562 m)     Body mass index is 33 16 kg/m²  BMI Counseling: Body mass index is 33 16 kg/m²  Discussed the patient's BMI with her  The BMI is above normal  Nutrition recommendations include reducing portion sizes, decreasing overall calorie intake, 3-5 servings of fruits/vegetables daily, reducing fast food intake, consuming healthier snacks, decreasing soda and/or juice intake, moderation in carbohydrate intake, increasing intake of lean protein, reducing intake of saturated fat and trans fat and reducing intake of cholesterol  Exercise recommendations include vigorous aerobic physical activity for 75 minutes/week, exercising 3-5 times per week and strength training exercises  Physical Exam  Vitals signs and nursing note reviewed  Constitutional:       Appearance: Normal appearance  She is well-developed  HENT:      Head: Normocephalic and atraumatic        Right Ear: Tympanic membrane, ear canal and external ear normal       Left Ear: Tympanic membrane, ear canal and external ear normal       Nose: Nose normal       Mouth/Throat:      Pharynx: Uvula midline  Eyes:      General: Lids are normal       Conjunctiva/sclera: Conjunctivae normal       Pupils: Pupils are equal, round, and reactive to light  Neck:      Musculoskeletal: Full passive range of motion without pain, normal range of motion and neck supple  Thyroid: No thyroid mass or thyromegaly  Vascular: No JVD  Trachea: Trachea and phonation normal    Cardiovascular:      Rate and Rhythm: Normal rate and regular rhythm  Pulses: Normal pulses  Heart sounds: Normal heart sounds, S1 normal and S2 normal  No murmur  No friction rub  No gallop  Pulmonary:      Effort: Pulmonary effort is normal       Breath sounds: Normal breath sounds  No decreased breath sounds  Abdominal:      General: Bowel sounds are normal       Palpations: Abdomen is soft  Tenderness: There is no abdominal tenderness  Hernia: No hernia is present  Genitourinary:     Comments: Deferred   Musculoskeletal: Normal range of motion  Skin:     General: Skin is warm and dry  Capillary Refill: Capillary refill takes less than 2 seconds  Neurological:      Mental Status: She is alert and oriented to person, place, and time  Cranial Nerves: No cranial nerve deficit  Deep Tendon Reflexes: Reflexes are normal and symmetric  Psychiatric:         Speech: Speech normal          Behavior: Behavior normal  Behavior is cooperative  Thought Content:  Thought content normal          Judgment: Judgment normal            Appointment on 07/15/2020   Component Date Value Ref Range Status    Sodium 07/15/2020 140  136 - 145 mmol/L Final    Potassium 07/15/2020 3 6  3 5 - 5 3 mmol/L Final    Chloride 07/15/2020 105  100 - 108 mmol/L Final    CO2 07/15/2020 28  21 - 32 mmol/L Final    ANION GAP 07/15/2020 7  4 - 13 mmol/L Final    BUN 07/15/2020 9  5 - 25 mg/dL Final    Creatinine 07/15/2020 0 80  0 60 - 1 30 mg/dL Final    Standardized to IDMS reference method    Glucose, Fasting 07/15/2020 165* 65 - 99 mg/dL Final      Specimen collection should occur prior to Sulfasalazine administration due to the potential for falsely depressed results  Specimen collection should occur prior to Sulfapyridine administration due to the potential for falsely elevated results   Calcium 07/15/2020 8 7  8 3 - 10 1 mg/dL Final    AST 07/15/2020 102* 5 - 45 U/L Final      Specimen collection should occur prior to Sulfasalazine administration due to the potential for falsely depressed results   ALT 07/15/2020 34  12 - 78 U/L Final      Specimen collection should occur prior to Sulfasalazine and/or Sulfapyridine administration due to the potential for falsely depressed results   Alkaline Phosphatase 07/15/2020 99  46 - 116 U/L Final    Total Protein 07/15/2020 7 6  6 4 - 8 2 g/dL Final    Albumin 07/15/2020 4 0  3 5 - 5 0 g/dL Final    Total Bilirubin 07/15/2020 0 80  0 20 - 1 00 mg/dL Final      Use of this assay is not recommended for patients undergoing treatment with eltrombopag due to the potential for falsely elevated results   eGFR 07/15/2020 94  ml/min/1 73sq m Final     I have reviewed all the lab results  There are some abnormalities that are not critical to the patient's health, I have discussed these in person at this office appointment  Elevated fasting glucose -   A1c noted   Lab Results   Component Value Date    HGBA1C 6 0 06/19/2020        Recommend lifestyle and dietary changes which include plant based low glycemic diet  Counseled at length on the importance of diet and exercise regarding the control of their diabetes

## 2020-08-28 ENCOUNTER — OFFICE VISIT (OUTPATIENT)
Dept: FAMILY MEDICINE CLINIC | Facility: CLINIC | Age: 60
End: 2020-08-28
Payer: COMMERCIAL

## 2020-08-28 VITALS
BODY MASS INDEX: 34.41 KG/M2 | SYSTOLIC BLOOD PRESSURE: 160 MMHG | TEMPERATURE: 98 F | HEIGHT: 62 IN | OXYGEN SATURATION: 100 % | WEIGHT: 187 LBS | HEART RATE: 103 BPM | DIASTOLIC BLOOD PRESSURE: 88 MMHG

## 2020-08-28 DIAGNOSIS — Z11.59 NEED FOR HEPATITIS C SCREENING TEST: ICD-10-CM

## 2020-08-28 DIAGNOSIS — Z11.4 SCREENING FOR HUMAN IMMUNODEFICIENCY VIRUS: ICD-10-CM

## 2020-08-28 DIAGNOSIS — I10 ESSENTIAL HYPERTENSION: Primary | ICD-10-CM

## 2020-08-28 PROCEDURE — 3079F DIAST BP 80-89 MM HG: CPT | Performed by: NURSE PRACTITIONER

## 2020-08-28 PROCEDURE — 3008F BODY MASS INDEX DOCD: CPT | Performed by: NURSE PRACTITIONER

## 2020-08-28 PROCEDURE — 3077F SYST BP >= 140 MM HG: CPT | Performed by: NURSE PRACTITIONER

## 2020-08-28 PROCEDURE — 1036F TOBACCO NON-USER: CPT | Performed by: NURSE PRACTITIONER

## 2020-08-28 PROCEDURE — 99213 OFFICE O/P EST LOW 20 MIN: CPT | Performed by: NURSE PRACTITIONER

## 2020-08-28 RX ORDER — OLMESARTAN MEDOXOMIL 40 MG/1
40 TABLET ORAL DAILY
Qty: 30 TABLET | Refills: 0 | Status: SHIPPED | OUTPATIENT
Start: 2020-08-28 | End: 2020-09-18 | Stop reason: SDUPTHER

## 2020-08-28 NOTE — PATIENT INSTRUCTIONS
Blood Pressure Diary  Judi Miner  COMMENTS                                                                            Keep a log of your blood pressure readings at home  Please take blood pressure at same time of day  Please record date and time blood pressure was taken  Make sure to take your blood pressure when you are seated and resting for about 3 minutes  You can send these to me via [email or web address] or by calling the office  This will help me manage your blood pressure better  Hypertension   AMBULATORY CARE:   Hypertension  is high blood pressure (BP)  Your BP is the force of your blood moving against the walls of your arteries  Normal BP is less than 120/80  Prehypertension is between 120/80 and 139/89  Hypertension is 140/90 or higher  Hypertension causes your BP to get so high that your heart has to work much harder than normal  This can damage your heart  You can control hypertension with a healthy lifestyle or medicines  A controlled blood pressure helps protect your organs, such as your heart, lungs, brain, and kidneys  Common symptoms include the following:   · Headache     · Blurred vision     · Chest pain     · Dizziness or weakness     · Trouble breathing    · Nosebleeds  Call 911 for any of the following:   · You have discomfort in your chest that feels like squeezing, pressure, fullness, or pain  · You become confused or have difficulty speaking  · You suddenly feel lightheaded or have trouble breathing  · You have pain or discomfort in your back, neck, jaw, stomach, or arm  Seek care immediately if:   · You have a severe headache or vision loss  · You have weakness in an arm or leg  Contact your healthcare provider if:   · You feel faint, dizzy, confused, or drowsy  · You have been taking your BP medicine and your BP is still higher than your healthcare provider says it should be      · You have questions or concerns about your condition or care   Treatment for hypertension  may include medicine to lower your BP and lower your cholesterol level  A low cholesterol level helps prevent heart disease and makes it easier to control your blood pressure  You may also need to make lifestyle changes  Take your medicine exactly as directed  Manage hypertension:  Talk with your healthcare provider about these and other ways to manage hypertension:  · Check your BP at home  Sit and rest for 5 minutes before you take your BP  Extend your arm and support it on a flat surface  Your arm should be at the same level as your heart  Follow the directions that came with your BP monitor  If possible, take at least 2 BP readings each time  Take your BP at least twice a day at the same times each day, such as morning and evening  Keep a record of your BP readings and bring it to your follow-up visits  Ask your healthcare provider what your BP should be  · Limit sodium (salt) as directed  Too much sodium can affect your fluid balance  Check labels to find low-sodium or no-salt-added foods  Some low-sodium foods use potassium salts for flavor  Too much potassium can also cause health problems  Your healthcare provider will tell you how much sodium and potassium are safe for you to have in a day  He or she may recommend that you limit sodium to 2,300 mg a day  · Follow the meal plan recommended by your healthcare provider  A dietitian or your provider can give you more information on low-sodium plans or the DASH (Dietary Approaches to Stop Hypertension) eating plan  The DASH plan is low in sodium, unhealthy fats, and total fat  It is high in potassium, calcium, and fiber  · Exercise to maintain a healthy weight  Exercise at least 30 minutes per day, on most days of the week  This will help decrease your blood pressure  Ask your healthcare provider about the best exercise plan for you  · Decrease stress  This may help lower your BP   Learn ways to relax, such as deep breathing or listening to music  · Limit alcohol  Women should limit alcohol to 1 drink a day  Men should limit alcohol to 2 drinks a day  A drink of alcohol is 12 ounces of beer, 5 ounces of wine, or 1½ ounces of liquor  · Do not smoke  Nicotine and other chemicals in cigarettes and cigars can increase your BP and also cause lung damage  Ask your healthcare provider for information if you currently smoke and need help to quit  E-cigarettes or smokeless tobacco still contain nicotine  Talk to your healthcare provider before you use these products  · Manage any other health conditions you have  Health conditions such as diabetes can increase your risk for hypertension  Follow your healthcare provider's instructions and take all your medicines as directed  Follow up with your healthcare provider as directed: You will need to return to have your BP checked and to have other lab tests done  Write down your questions so you remember to ask them during your visits  © 2017 [employer]  Information is for End User's use only and may not be sold, redistributed or otherwise used for commercial purposes  All illustrations and images included in CareNotes® are the copyrighted property of [employer]  or Marty Martell  The above information is an  only  It is not intended as medical advice for individual conditions or treatments  Talk to your doctor, nurse or pharmacist before following any medical regimen to see if it is safe and effective for you  DASH Eating Plan   AMBULATORY CARE:   The DASH (Dietary Approaches to Stop Hypertension) Eating Plan  is designed to help prevent or lower high blood pressure  It can also help to lower LDL (bad) cholesterol and decrease your risk for heart disease  The plan is low in sodium, sugar, unhealthy fats, and total fat  It is high in potassium, calcium, magnesium, and fiber   These nutrients are added when you eat more fruits, vegetables, and whole grains  Your sodium limit each day: Your dietitian will tell you how much sodium is safe for you to have each day  People with high blood pressure should have no more than 1,500 to 2,300 mg of sodium in a day  A teaspoon (tsp) of salt has 2,300 mg of sodium  This may seem like a difficult goal, but small changes to the foods you eat can make a big difference  Your healthcare provider or dietitian can help you create a meal plan that follows your sodium limit  How to limit sodium:   · Read food labels  Food labels can help you choose foods that are low in sodium  The amount of sodium is listed in milligrams (mg)  The % Daily Value (DV) column tells you how much of your daily needs are met by 1 serving of the food for each nutrient listed  Choose foods that have less than 5% of the DV of sodium  These foods are considered low in sodium  Foods that have 20% or more of the DV of sodium are considered high in sodium  Avoid foods that have more than 300 mg of sodium in each serving  Choose foods that say low-sodium, reduced-sodium, or no salt added on the food label  · Avoid salt  Do not salt food at the table, and add very little salt to foods during cooking  Use herbs and spices, such as onions, garlic, and salt-free seasonings to add flavor to foods  Try lemon or lime juice or vinegar to give foods a tart flavor  Use hot peppers or a small amount of hot pepper sauce to add a spicy flavor to foods  · Ask about salt substitutes  Ask your healthcare provider if you may use salt substitutes  Some salt substitutes have ingredients that can be harmful if you have certain health conditions  · Choose foods carefully at restaurants  Meals from restaurants, especially fast food restaurants, are often high in sodium  Some restaurants have nutrition information that tells you the amount of sodium in their foods   Ask to have your food prepared with less, or no salt   What you need to know about fats:   · Include healthy fats  Examples are unsaturated fats and omega-3 fatty acids  Unsaturated fats are found in soybean, canola, olive, or sunflower oil, and liquid and soft tub margarines  Omega-3 fatty acids are found in fatty fish, such as salmon, tuna, mackerel, and sardines  It is also found in flaxseed oil and ground flaxseed  · Avoid unhealthy fats  Do not eat unhealthy fats, such as saturated fats and trans fats  Saturated fats are found in foods that contain fat from animals  Examples are fatty meats, whole milk, butter, cream, and other dairy foods  It is also found in shortening, stick margarine, palm oil, and coconut oil  Trans fats are found in fried foods, crackers, chips, and baked goods made with margarine or shortening  Foods to include: With the DASH eating plan, you need to eat a certain number of servings from each food group  This will help you get enough of certain nutrients and limit others  The amount of servings you should eat depends on how many calories you need  Your dietitian can tell you how many calories you need  The number of servings listed next to the food groups below are for people who need about 2,000 calories each day    · Grains:  6 to 8 servings (3 of these servings should be whole-grain foods)    ¨ 1 slice of whole-grain bread     ¨ 1 ounce of dry cereal    ¨ ½ cup of cooked cereal, pasta, or brown rice    · Vegetables and fruits:  4 to 5 servings of fruits and 4 to 5 servings of vegetables    ¨ 1 medium fruit    ¨ ½ cup of frozen, canned (no added salt), or chopped fresh vegetables     ¨ ½ cup of fresh, frozen, dried, or canned fruit (canned in light syrup or fruit juice)    ¨ ½ cup of vegetable or fruit juice    · Dairy:  2 to 3 servings    ¨ 1 cup of nonfat (skim) or 1% milk    ¨ 1½ ounces of fat-free or low-fat cheese    ¨ 6 ounces of nonfat or low-fat yogurt    · Lean meat, poultry, and fish:  6 ounces or less    [employer] (chicken, turkey) with no skin    ¨ Fish (especially fatty fish, such as salmon, fresh tuna, or mackerel)    ¨ Lean beef and pork (loin, round, extra lean hamburger)    ¨ Egg whites and egg substitutes    · Nuts, seeds, and legumes:  4 to 5 servings each week    ¨ ½ cup of cooked beans and peas    ¨ 1½ ounces of unsalted nuts    ¨ 2 tablespoons of peanut butter or seeds    · Sweets and added sugars:  5 or less each week    ¨ 1 tablespoon of sugar, jelly, or jam    ¨ ½ cup of sorbet or gelatin    ¨ 1 cup of lemonade    · Fats:  2 to 3 servings each week    ¨ 1 teaspoon of soft margarine or vegetable oil    ¨ 1 tablespoon of mayonnaise    ¨ 2 tablespoons of salad dressing  Foods to avoid:   · Grains:      [employer], such as doughnuts, pastries, cookies, and biscuits (high in fat and sugar)    ¨ Mixes for cornbread and biscuits, packaged foods, such as bread stuffing, rice and pasta mixes, macaroni and cheese, and instant cereals (high in sodium)    · Fruits and vegetables:      ¨ Regular, canned vegetables (high in sodium)    ¨ Sauerkraut, pickled vegetables, and other foods prepared in brine (high in sodium)    ¨ Fried vegetables or vegetables in butter or high-fat sauces    ¨ Fruit in cream or butter sauce (high in fat)    · Dairy:      ¨ Whole milk, 2% milk, and cream (high in fat)    ¨ Regular cheese and processed cheese (high in fat and sodium)    · Meats and protein foods:      ¨ Smoked or cured meat, such as corned beef, clement, ham, hot dogs, and sausage (high in fat and sodium)    ¨ Canned beans and canned meats or spreads, such as potted meats, sardines, anchovies, and imitation seafood (high in sodium)    ¨ Deli or lunch meats, such as bologna, ham, turkey, and roast beef (high in sodium)    ¨ High-fat meat (T-bone steak, regular hamburger, and ribs)    ¨ Whole eggs and egg yolks (high in fat)    · Other:      ¨ Seasonings made with salt, such as garlic salt, celery salt, onion salt, seasoned salt, meat tenderizers, and monosodium glutamate (MSG)    ¨ Miso soup and canned or dried soup mixes (high in sodium)    ¨ Regular soy sauce, barbecue sauce, teriyaki sauce, steak sauce, Worcestershire sauce, and most flavored vinegars (high in sodium)    ¨ Regular condiments, such as mustard, ketchup, and salad dressings (high in sodium)    ¨ Gravy and sauces, such as Leon or cheese sauces (high in sodium and fat)    ¨ Drinks high in sugar, such as soda or fruit drinks    [employer] foods, such as salted chips, popcorn, pretzels, pork rinds, salted crackers, and salted nuts    ¨ Frozen foods, such as dinners, entrees, vegetables with sauces, and breaded meats (high in sodium)  Other guidelines to follow:   · Maintain a healthy weight  Your risk for heart disease is higher if you are overweight  Your healthcare provider may suggest that you lose weight if you are overweight  You can lose weight by eating fewer calories and foods that have added sugars and fat  The DASH meal plan can help you do this  Decrease calories by eating smaller portions at each meal and fewer snacks  Ask your healthcare provider for more information about how to lose weight  · Exercise regularly  Regular exercise can help you reach or maintain a healthy weight  Regular exercise can also help decrease your blood pressure and improve your cholesterol levels  Get 30 minutes or more of moderate exercise each day of the week  To lose weight, get at least 60 minutes of exercise  Talk to your healthcare provider about the best exercise program for you  · Limit alcohol  Women should limit alcohol to 1 drink a day  Men should limit alcohol to 2 drinks a day  A drink of alcohol is 12 ounces of beer, 5 ounces of wine, or 1½ ounces of liquor  © 2017 [address] [employer] Information is for End User's use only and may not be sold, redistributed or otherwise used for commercial purposes   All illustrations and images included in CareNotes® are the copyrighted property of [employer]  or Marty Martell  The above information is an  only  It is not intended as medical advice for individual conditions or treatments  Talk to your doctor, nurse or pharmacist before following any medical regimen to see if it is safe and effective for you

## 2020-08-28 NOTE — PROGRESS NOTES
Assessment/Plan:    Problem List Items Addressed This Visit     Essential hypertension - Primary    Relevant Medications    olmesartan (BENICAR) 40 mg tablet      Other Visit Diagnoses     Screening for human immunodeficiency virus        Relevant Orders    HIV 1/2 Antigen/Antibody (4th Generation) w Reflex [facility]    Need for hepatitis C screening test        Relevant Orders    Hepatitis C antibody           Diagnoses and all orders for this visit:    Essential hypertension  Comments:  increase benicar and f/u 2 weeks for NSG BP check  Orders:  -     olmesartan (BENICAR) 40 mg tablet; Take 1 tablet (40 mg total) by mouth daily    Screening for human immunodeficiency virus  -     HIV 1/2 Antigen/Antibody (4th Generation) w Reflex [facility]; Future    Need for hepatitis C screening test  -     Hepatitis C antibody; Future        No problem-specific Assessment & Plan notes found for this encounter  Subjective:      Patient ID: Earl Soares is a 64 y o  female  Earl Soares presents for F/U BP  She c/o BLLE swelling 2/2 to amlodipine  Patient states that she had the same affect when she was on 10 mg amlodipine in the past     Edema  Patient complains of edema in both ankles and feet  The edema has been moderate  Onset of symptoms was a few days ago, and patient reports symptoms have stabilized since that time  The edema is present in the afternoon and in the evening  The patient states the problem is new  The swelling has been aggravated by dependency of involved area, hot weather and use of calcium channel blockers  The swelling has been relieved by elevation of involved area  Associated factors include: use of calcium channel blockers  Cardiac risk factors include hypertension and obesity (BMI >= 30 kg/m2)          The following portions of the patient's history were reviewed and updated as appropriate:   She has a past medical history of Anxiety, Heart attack ([facility] [address] ) (2006), History of blood transfusion (), History of depression, and Hypertension  ,  does not have any pertinent problems on file  ,   has a past surgical history that includes Gastric bypass; Oophorectomy (Right); Cholecystectomy; Breast surgery (Right, ); and  section (, )  ,  family history includes Diabetes in her brother, mother, and sister; Heart attack in her father; Substance Abuse in her brother  ,   reports that she has never smoked  She has never used smokeless tobacco  She reports current alcohol use  She reports previous drug use ,  is allergic to nsaids; oxycodone-acetaminophen; propoxyphene; and shellfish allergy     Current Outpatient Medications   Medication Sig Dispense Refill    busPIRone (BUSPAR) 5 mg tablet Take 1 tablet (5 mg total) by mouth 2 (two) times a day 60 tablet 2    diphenhydrAMINE (Benadryl Allergy) 25 mg capsule Take 25 mg by mouth every 6 (six) hours as needed for itching      olmesartan (BENICAR) 40 mg tablet Take 1 tablet (40 mg total) by mouth daily 30 tablet 0     No current facility-administered medications for this visit  Review of Systems   Constitutional: Negative  HENT: Negative  Eyes: Negative  Respiratory: Negative  Cardiovascular: Positive for leg swelling  Gastrointestinal: Negative  Endocrine: Negative  Genitourinary: Negative  Musculoskeletal: Negative  Skin: Negative  Allergic/Immunologic: Negative  Neurological: Negative  Hematological: Negative  Psychiatric/Behavioral: Negative  Objective:  Vitals:    20 0845 20 0903   BP: 170/90 160/88   BP Location: Left arm    Patient Position: Sitting    Cuff Size: Large    Pulse: 103    Temp: 98 °F (36 7 °C)    TempSrc: Tympanic    SpO2: 100%    Weight: 84 8 kg (187 lb)    Height: 5' 1 5" (1 562 m)      Body mass index is 34 76 kg/m²  BMI Counseling: Body mass index is 34 76 kg/m²  Discussed the patient's BMI with her   The BMI is above normal  Nutrition recommendations include reducing portion sizes, decreasing overall calorie intake, 3-5 servings of fruits/vegetables daily, reducing fast food intake, consuming healthier snacks, decreasing soda and/or juice intake, moderation in carbohydrate intake, increasing intake of lean protein, reducing intake of saturated fat and trans fat and reducing intake of cholesterol  Exercise recommendations include vigorous aerobic physical activity for 75 minutes/week, exercising 3-5 times per week and strength training exercises  Physical Exam  Vitals signs and nursing note reviewed  Constitutional:       Appearance: Normal appearance  She is well-developed  HENT:      Head: Normocephalic and atraumatic  Right Ear: Tympanic membrane, ear canal and external ear normal       Left Ear: Tympanic membrane, ear canal and external ear normal       Nose: Nose normal       Mouth/Throat:      Mouth: Mucous membranes are moist       Pharynx: Uvula midline  Eyes:      General: Lids are normal       Conjunctiva/sclera: Conjunctivae normal       Pupils: Pupils are equal, round, and reactive to light  Neck:      Musculoskeletal: Full passive range of motion without pain, normal range of motion and neck supple  Thyroid: No thyroid mass or thyromegaly  Vascular: No JVD  Trachea: Trachea and phonation normal    Cardiovascular:      Rate and Rhythm: Normal rate and regular rhythm  Pulses: Normal pulses  Heart sounds: Normal heart sounds, S1 normal and S2 normal  No murmur  No friction rub  No gallop  Pulmonary:      Effort: Pulmonary effort is normal       Breath sounds: Normal breath sounds  Abdominal:      General: Bowel sounds are normal       Palpations: Abdomen is soft  Tenderness: There is no abdominal tenderness  Genitourinary:     Comments: Deferred   Musculoskeletal: Normal range of motion  Right lower le+ Edema present  Left lower le+ Edema present     Skin:     General: Skin is warm and dry  Capillary Refill: Capillary refill takes less than 2 seconds  Neurological:      General: No focal deficit present  Mental Status: She is alert and oriented to person, place, and time  Cranial Nerves: Cranial nerves are intact  No cranial nerve deficit  Sensory: Sensation is intact  Motor: Motor function is intact  Coordination: Coordination is intact  Gait: Gait is intact  Deep Tendon Reflexes: Reflexes are normal and symmetric  Psychiatric:         Attention and Perception: Attention and perception normal          Mood and Affect: Mood and affect normal          Speech: Speech normal          Behavior: Behavior normal  Behavior is cooperative  Thought Content:  Thought content normal          Cognition and Memory: Cognition normal          Judgment: Judgment normal

## 2020-09-17 ENCOUNTER — APPOINTMENT (OUTPATIENT)
Dept: LAB | Facility: CLINIC | Age: 60
End: 2020-09-17
Payer: COMMERCIAL

## 2020-09-17 ENCOUNTER — TRANSCRIBE ORDERS (OUTPATIENT)
Dept: LAB | Facility: CLINIC | Age: 60
End: 2020-09-17

## 2020-09-17 DIAGNOSIS — Z11.59 NEED FOR HEPATITIS C SCREENING TEST: ICD-10-CM

## 2020-09-17 DIAGNOSIS — Z11.4 SCREENING FOR HUMAN IMMUNODEFICIENCY VIRUS: ICD-10-CM

## 2020-09-17 LAB — HCV AB SER QL: NORMAL

## 2020-09-17 PROCEDURE — 87389 HIV-1 AG W/HIV-1&-2 AB AG IA: CPT

## 2020-09-17 PROCEDURE — 86803 HEPATITIS C AB TEST: CPT

## 2020-09-17 PROCEDURE — 36415 COLL VENOUS BLD VENIPUNCTURE: CPT

## 2020-09-18 ENCOUNTER — OFFICE VISIT (OUTPATIENT)
Dept: FAMILY MEDICINE CLINIC | Facility: CLINIC | Age: 60
End: 2020-09-18
Payer: COMMERCIAL

## 2020-09-18 VITALS
HEART RATE: 97 BPM | TEMPERATURE: 99.1 F | DIASTOLIC BLOOD PRESSURE: 84 MMHG | OXYGEN SATURATION: 99 % | BODY MASS INDEX: 34.41 KG/M2 | WEIGHT: 187 LBS | HEIGHT: 62 IN | SYSTOLIC BLOOD PRESSURE: 160 MMHG

## 2020-09-18 DIAGNOSIS — I10 ESSENTIAL HYPERTENSION: ICD-10-CM

## 2020-09-18 DIAGNOSIS — E66.09 CLASS 1 OBESITY DUE TO EXCESS CALORIES WITHOUT SERIOUS COMORBIDITY WITH BODY MASS INDEX (BMI) OF 34.0 TO 34.9 IN ADULT: ICD-10-CM

## 2020-09-18 DIAGNOSIS — F41.8 OTHER SPECIFIED ANXIETY DISORDERS: ICD-10-CM

## 2020-09-18 DIAGNOSIS — M62.830 LUMBAR PARASPINAL MUSCLE SPASM: ICD-10-CM

## 2020-09-18 DIAGNOSIS — I10 ESSENTIAL HYPERTENSION: Primary | ICD-10-CM

## 2020-09-18 DIAGNOSIS — Z28.21 INFLUENZA VACCINATION DECLINED: ICD-10-CM

## 2020-09-18 DIAGNOSIS — M54.50 ACUTE MIDLINE LOW BACK PAIN WITHOUT SCIATICA: ICD-10-CM

## 2020-09-18 LAB — HIV 1+2 AB+HIV1 P24 AG SERPL QL IA: NORMAL

## 2020-09-18 PROCEDURE — 99213 OFFICE O/P EST LOW 20 MIN: CPT | Performed by: NURSE PRACTITIONER

## 2020-09-18 RX ORDER — CYCLOBENZAPRINE HCL 10 MG
10 TABLET ORAL 3 TIMES DAILY PRN
Qty: 30 TABLET | Refills: 0 | Status: SHIPPED | OUTPATIENT
Start: 2020-09-18 | End: 2020-11-23 | Stop reason: ALTCHOICE

## 2020-09-18 RX ORDER — BUSPIRONE HYDROCHLORIDE 5 MG/1
5 TABLET ORAL 2 TIMES DAILY
Qty: 60 TABLET | Refills: 2 | Status: SHIPPED | OUTPATIENT
Start: 2020-09-18 | End: 2020-09-28 | Stop reason: ALTCHOICE

## 2020-09-18 RX ORDER — METOPROLOL SUCCINATE 25 MG/1
25 TABLET, EXTENDED RELEASE ORAL DAILY
Qty: 30 TABLET | Refills: 0 | Status: SHIPPED | OUTPATIENT
Start: 2020-09-18 | End: 2020-10-05 | Stop reason: SDUPTHER

## 2020-09-18 RX ORDER — OLMESARTAN MEDOXOMIL 40 MG/1
40 TABLET ORAL DAILY
Qty: 30 TABLET | Refills: 0 | Status: SHIPPED | OUTPATIENT
Start: 2020-09-18 | End: 2020-10-05 | Stop reason: ALTCHOICE

## 2020-09-18 NOTE — PATIENT INSTRUCTIONS
Blood Pressure Diary  Judi Arango Solders Thurs Fri Sat  COMMENTS                                                                            Keep a log of your blood pressure readings at home  Please take blood pressure at same time of day  Please record date and time blood pressure was taken  Make sure to take your blood pressure when you are seated and resting for about 3 minutes  You can send these to me via [employer] or by calling the office  This will help me manage your blood pressure better  Hypertension   AMBULATORY CARE:   Hypertension  is high blood pressure (BP)  Your BP is the force of your blood moving against the walls of your arteries  Normal BP is less than 120/80  Prehypertension is between 120/80 and 139/89  Hypertension is 140/90 or higher  Hypertension causes your BP to get so high that your heart has to work much harder than normal  This can damage your heart  You can control hypertension with a healthy lifestyle or medicines  A controlled blood pressure helps protect your organs, such as your heart, lungs, brain, and kidneys  Common symptoms include the following:   · Headache     · Blurred vision     · Chest pain     · Dizziness or weakness     · Trouble breathing    · Nosebleeds  Call 911 for any of the following:   · You have discomfort in your chest that feels like squeezing, pressure, fullness, or pain  · You become confused or have difficulty speaking  · You suddenly feel lightheaded or have trouble breathing  · You have pain or discomfort in your back, neck, jaw, stomach, or arm  Seek care immediately if:   · You have a severe headache or vision loss  · You have weakness in an arm or leg  Contact your healthcare provider if:   · You feel faint, dizzy, confused, or drowsy  · You have been taking your BP medicine and your BP is still higher than your healthcare provider says it should be      · You have questions or concerns about your condition or care   Treatment for hypertension  may include medicine to lower your BP and lower your cholesterol level  A low cholesterol level helps prevent heart disease and makes it easier to control your blood pressure  You may also need to make lifestyle changes  Take your medicine exactly as directed  Manage hypertension:  Talk with your healthcare provider about these and other ways to manage hypertension:  · Check your BP at home  Sit and rest for 5 minutes before you take your BP  Extend your arm and support it on a flat surface  Your arm should be at the same level as your heart  Follow the directions that came with your BP monitor  If possible, take at least 2 BP readings each time  Take your BP at least twice a day at the same times each day, such as morning and evening  Keep a record of your BP readings and bring it to your follow-up visits  Ask your healthcare provider what your BP should be  · Limit sodium (salt) as directed  Too much sodium can affect your fluid balance  Check labels to find low-sodium or no-salt-added foods  Some low-sodium foods use potassium salts for flavor  Too much potassium can also cause health problems  Your healthcare provider will tell you how much sodium and potassium are safe for you to have in a day  He or she may recommend that you limit sodium to 2,300 mg a day  · Follow the meal plan recommended by your healthcare provider  A dietitian or your provider can give you more information on low-sodium plans or the DASH (Dietary Approaches to Stop Hypertension) eating plan  The DASH plan is low in sodium, unhealthy fats, and total fat  It is high in potassium, calcium, and fiber  · Exercise to maintain a healthy weight  Exercise at least 30 minutes per day, on most days of the week  This will help decrease your blood pressure  Ask your healthcare provider about the best exercise plan for you  · Decrease stress  This may help lower your BP   Learn ways to relax, such as deep breathing or listening to music  · Limit alcohol  Women should limit alcohol to 1 drink a day  Men should limit alcohol to 2 drinks a day  A drink of alcohol is 12 ounces of beer, 5 ounces of wine, or 1½ ounces of liquor  · Do not smoke  Nicotine and other chemicals in cigarettes and cigars can increase your BP and also cause lung damage  Ask your healthcare provider for information if you currently smoke and need help to quit  E-cigarettes or smokeless tobacco still contain nicotine  Talk to your healthcare provider before you use these products  · Manage any other health conditions you have  Health conditions such as diabetes can increase your risk for hypertension  Follow your healthcare provider's instructions and take all your medicines as directed  Follow up with your healthcare provider as directed: You will need to return to have your BP checked and to have other lab tests done  Write down your questions so you remember to ask them during your visits  © 2017 [address]  Information is for End User's use only and may not be sold, redistributed or otherwise used for commercial purposes  All illustrations and images included in CareNotes® are the copyrighted property of [employer]  or Marty Martell  The above information is an  only  It is not intended as medical advice for individual conditions or treatments  Talk to your doctor, nurse or pharmacist before following any medical regimen to see if it is safe and effective for you  DASH Eating Plan   AMBULATORY CARE:   The DASH (Dietary Approaches to Stop Hypertension) Eating Plan  is designed to help prevent or lower high blood pressure  It can also help to lower LDL (bad) cholesterol and decrease your risk for heart disease  The plan is low in sodium, sugar, unhealthy fats, and total fat  It is high in potassium, calcium, magnesium, and fiber   These nutrients are added when you eat more fruits, vegetables, and whole grains  Your sodium limit each day: Your dietitian will tell you how much sodium is safe for you to have each day  People with high blood pressure should have no more than 1,500 to 2,300 mg of sodium in a day  A teaspoon (tsp) of salt has 2,300 mg of sodium  This may seem like a difficult goal, but small changes to the foods you eat can make a big difference  Your healthcare provider or dietitian can help you create a meal plan that follows your sodium limit  How to limit sodium:   · Read food labels  Food labels can help you choose foods that are low in sodium  The amount of sodium is listed in milligrams (mg)  The % Daily Value (DV) column tells you how much of your daily needs are met by 1 serving of the food for each nutrient listed  Choose foods that have less than 5% of the DV of sodium  These foods are considered low in sodium  Foods that have 20% or more of the DV of sodium are considered high in sodium  Avoid foods that have more than 300 mg of sodium in each serving  Choose foods that say low-sodium, reduced-sodium, or no salt added on the food label  · Avoid salt  Do not salt food at the table, and add very little salt to foods during cooking  Use herbs and spices, such as onions, garlic, and salt-free seasonings to add flavor to foods  Try lemon or lime juice or vinegar to give foods a tart flavor  Use hot peppers or a small amount of hot pepper sauce to add a spicy flavor to foods  · Ask about salt substitutes  Ask your healthcare provider if you may use salt substitutes  Some salt substitutes have ingredients that can be harmful if you have certain health conditions  · Choose foods carefully at restaurants  Meals from restaurants, especially fast food restaurants, are often high in sodium  Some restaurants have nutrition information that tells you the amount of sodium in their foods   Ask to have your food prepared with less, or no salt   What you need to know about fats:   · Include healthy fats  Examples are unsaturated fats and omega-3 fatty acids  Unsaturated fats are found in soybean, canola, olive, or sunflower oil, and liquid and soft tub margarines  Omega-3 fatty acids are found in fatty fish, such as salmon, tuna, mackerel, and sardines  It is also found in flaxseed oil and ground flaxseed  · Avoid unhealthy fats  Do not eat unhealthy fats, such as saturated fats and trans fats  Saturated fats are found in foods that contain fat from animals  Examples are fatty meats, whole milk, butter, cream, and other dairy foods  It is also found in shortening, stick margarine, palm oil, and coconut oil  Trans fats are found in fried foods, crackers, chips, and baked goods made with margarine or shortening  Foods to include: With the DASH eating plan, you need to eat a certain number of servings from each food group  This will help you get enough of certain nutrients and limit others  The amount of servings you should eat depends on how many calories you need  Your dietitian can tell you how many calories you need  The number of servings listed next to the food groups below are for people who need about 2,000 calories each day    · Grains:  6 to 8 servings (3 of these servings should be whole-grain foods)    ¨ 1 slice of whole-grain bread     ¨ 1 ounce of dry cereal    ¨ ½ cup of cooked cereal, pasta, or brown rice    · Vegetables and fruits:  4 to 5 servings of fruits and 4 to 5 servings of vegetables    ¨ 1 medium fruit    ¨ ½ cup of frozen, canned (no added salt), or chopped fresh vegetables     ¨ ½ cup of fresh, frozen, dried, or canned fruit (canned in light syrup or fruit juice)    ¨ ½ cup of vegetable or fruit juice    · Dairy:  2 to 3 servings    ¨ 1 cup of nonfat (skim) or 1% milk    ¨ 1½ ounces of fat-free or low-fat cheese    ¨ 6 ounces of nonfat or low-fat yogurt    · Lean meat, poultry, and fish:  6 ounces or less    [employer] (chicken, turkey) with no skin    ¨ Fish (especially fatty fish, such as salmon, fresh tuna, or mackerel)    ¨ Lean beef and pork (loin, round, extra lean hamburger)    ¨ Egg whites and egg substitutes    · Nuts, seeds, and legumes:  4 to 5 servings each week    ¨ ½ cup of cooked beans and peas    ¨ 1½ ounces of unsalted nuts    ¨ 2 tablespoons of peanut butter or seeds    · Sweets and added sugars:  5 or less each week    ¨ 1 tablespoon of sugar, jelly, or jam    ¨ ½ cup of sorbet or gelatin    ¨ 1 cup of lemonade    · Fats:  2 to 3 servings each week    ¨ 1 teaspoon of soft margarine or vegetable oil    ¨ 1 tablespoon of mayonnaise    ¨ 2 tablespoons of salad dressing  Foods to avoid:   · Grains:      [employer], such as doughnuts, pastries, cookies, and biscuits (high in fat and sugar)    ¨ Mixes for cornbread and biscuits, packaged foods, such as bread stuffing, rice and pasta mixes, macaroni and cheese, and instant cereals (high in sodium)    · Fruits and vegetables:      ¨ Regular, canned vegetables (high in sodium)    ¨ Sauerkraut, pickled vegetables, and other foods prepared in brine (high in sodium)    ¨ Fried vegetables or vegetables in butter or high-fat sauces    ¨ Fruit in cream or butter sauce (high in fat)    · Dairy:      ¨ Whole milk, 2% milk, and cream (high in fat)    ¨ Regular cheese and processed cheese (high in fat and sodium)    · Meats and protein foods:      ¨ Smoked or cured meat, such as corned beef, clement, ham, hot dogs, and sausage (high in fat and sodium)    ¨ Canned beans and canned meats or spreads, such as potted meats, sardines, anchovies, and imitation seafood (high in sodium)    ¨ Deli or lunch meats, such as bologna, ham, turkey, and roast beef (high in sodium)    ¨ High-fat meat (T-bone steak, regular hamburger, and ribs)    ¨ Whole eggs and egg yolks (high in fat)    · Other:      ¨ Seasonings made with salt, such as garlic salt, celery salt, onion salt, seasoned salt, meat tenderizers, and monosodium glutamate (MSG)    ¨ Miso soup and canned or dried soup mixes (high in sodium)    ¨ Regular soy sauce, barbecue sauce, teriyaki sauce, steak sauce, Worcestershire sauce, and most flavored vinegars (high in sodium)    ¨ Regular condiments, such as mustard, ketchup, and salad dressings (high in sodium)    ¨ Gravy and sauces, such as Leon or cheese sauces (high in sodium and fat)    ¨ Drinks high in sugar, such as soda or fruit drinks    [employer] foods, such as salted chips, popcorn, pretzels, pork rinds, salted crackers, and salted nuts    ¨ Frozen foods, such as dinners, entrees, vegetables with sauces, and breaded meats (high in sodium)  Other guidelines to follow:   · Maintain a healthy weight  Your risk for heart disease is higher if you are overweight  Your healthcare provider may suggest that you lose weight if you are overweight  You can lose weight by eating fewer calories and foods that have added sugars and fat  The DASH meal plan can help you do this  Decrease calories by eating smaller portions at each meal and fewer snacks  Ask your healthcare provider for more information about how to lose weight  · Exercise regularly  Regular exercise can help you reach or maintain a healthy weight  Regular exercise can also help decrease your blood pressure and improve your cholesterol levels  Get 30 minutes or more of moderate exercise each day of the week  To lose weight, get at least 60 minutes of exercise  Talk to your healthcare provider about the best exercise program for you  · Limit alcohol  Women should limit alcohol to 1 drink a day  Men should limit alcohol to 2 drinks a day  A drink of alcohol is 12 ounces of beer, 5 ounces of wine, or 1½ ounces of liquor  © 2017 [address] [employer] Information is for End User's use only and may not be sold, redistributed or otherwise used for commercial purposes   All illustrations and images included in CareNotes® are the copyrighted property of [employer]  or Marty Martell  The above information is an  only  It is not intended as medical advice for individual conditions or treatments  Talk to your doctor, nurse or pharmacist before following any medical regimen to see if it is safe and effective for you  Back Pain   AMBULATORY CARE:   Back pain  is common  You may feel sore or stiff on one or both sides of your back  The pain may spread to your buttocks or thighs  Back pain may be caused by an injury, lack of exercise, or obesity  Repeated bending, lifting, twisting, or lifting heavy items can also cause back pain  Seek immediate care for the following symptoms:   · Pain, numbness, or weakness in one or both legs    · Pain that is so severe, you cannot walk    · Unable to control your urine or bowel movements    · Severe back pain with chest pain    · Severe back pain, nausea, and vomiting    · Severe back pain that spreads to your side or genital area  Contact your healthcare provider if:   · You have back pain that does not get better with rest and pain medicine  · You have a fever  · You have pain that worsens when you are on your back or when you rest     · You have pain that worsens when you cough or sneeze  · You lose weight without trying  · You have questions or concerns about your condition or care  Treatment for back pain  may include any of the following:  · NSAIDs , such as ibuprofen, help decrease swelling, pain, and fever  This medicine is available with or without a doctor's order  NSAIDs can cause stomach bleeding or kidney problems in certain people  If you take blood thinner medicine, always ask your healthcare provider if NSAIDs are safe for you  Always read the medicine label and follow directions  · Acetaminophen  decreases pain  It is available without a doctor's order  Ask how much to take and how often to take it  Follow directions   Acetaminophen can cause liver damage if not taken correctly  · Prescription pain medicine  may be given  Ask your healthcare provider how to take this medicine safely  Manage your back pain:   · Apply ice  on your back for 15 to 20 minutes every hour or as directed  Use an ice pack, or put crushed ice in a plastic bag  Cover it with a towel  Ice helps decrease swelling and pain  · Apply heat  on your back for 20 to 30 minutes every 2 hours for as many days as directed  Heat helps decrease pain and muscle spasms  You can alternate ice and heat  · Stay active  as much as you can without causing more pain  Bed rest could make your back pain worse  Avoid heavy lifting until your pain is gone  Follow up with your healthcare provider as directed:  Write down your questions so you remember to ask them during your visits  © 2017 [address] [employer] Information is for End User's use only and may not be sold, redistributed or otherwise used for commercial purposes  All illustrations and images included in CareNotes® are the copyrighted property of [employer]  or Marty Martell  The above information is an  only  It is not intended as medical advice for individual conditions or treatments  Talk to your doctor, nurse or pharmacist before following any medical regimen to see if it is safe and effective for you

## 2020-09-18 NOTE — PROGRESS NOTES
Assessment/Plan:    Problem List Items Addressed This Visit     Class 1 obesity due to excess calories without serious comorbidity with body mass index (BMI) of 34 0 to 34 9 in adult    Essential hypertension - Primary    Relevant Medications    metoprolol tartrate (LOPRESSOR) 25 mg tablet    olmesartan (BENICAR) 40 mg tablet      Other Visit Diagnoses     Influenza vaccination declined        Acute midline low back pain without sciatica        increase pain with working    Lumbar paraspinal muscle spasm        Relevant Medications    cyclobenzaprine (FLEXERIL) 10 mg tablet           Diagnoses and all orders for this visit:    Essential hypertension  -     metoprolol tartrate (LOPRESSOR) 25 mg tablet; Take 1 tablet (25 mg total) by mouth daily  -     olmesartan (BENICAR) 40 mg tablet; Take 1 tablet (40 mg total) by mouth daily    Influenza vaccination declined    Class 1 obesity due to excess calories without serious comorbidity with body mass index (BMI) of 34 0 to 34 9 in adult  Comments:  Discussed diet and exercise recommendations    Acute midline low back pain without sciatica  Comments:  increase pain with working    Lumbar paraspinal muscle spasm  -     cyclobenzaprine (FLEXERIL) 10 mg tablet; Take 1 tablet (10 mg total) by mouth 3 (three) times a day as needed for muscle spasms    Essential hypertension  Comments:  increase benicar and f/u 2 weeks for NSG BP check  Orders:  -     metoprolol tartrate (LOPRESSOR) 25 mg tablet; Take 1 tablet (25 mg total) by mouth daily  -     olmesartan (BENICAR) 40 mg tablet; Take 1 tablet (40 mg total) by mouth daily        No problem-specific Assessment & Plan notes found for this encounter  Subjective:      Patient ID: Raulito Davidson is a 64 y o  female  Raulito Davidson presents for BP check up and c/o midline lumber/sacral back pain  Patient states that the pain is accompanied with working    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  She states that she is the only person that is getting the patient up to stand  Back pain onset that the beginning of her shift  She has tried conservative measures with over-the-counter NSAIDs with minimal relief  We discussed back lifting techniques  Hypertension   This is a chronic problem  The problem has been gradually improving since onset  The problem is uncontrolled  Pertinent negatives include no anxiety, blurred vision, chest pain, headaches, malaise/fatigue, neck pain, orthopnea, palpitations, peripheral edema, PND, shortness of breath or sweats  There are no associated agents to hypertension  Risk factors for coronary artery disease include obesity and post-menopausal state  Past treatments include angiotensin blockers  The current treatment provides moderate improvement  There are no compliance problems  There is no history of angina, kidney disease, CAD/MI, CVA, heart failure, left ventricular hypertrophy, PVD or retinopathy  There is no history of chronic renal disease, a hypertension causing med, sleep apnea or a thyroid problem  Back Pain   This is a recurrent problem  The quality of the pain is described as aching and cramping  The pain does not radiate  The pain is at a severity of 9/10  The pain is moderate  Worse during: Worse while working  The symptoms are aggravated by bending and position (twisting)  Pertinent negatives include no abdominal pain, bladder incontinence, bowel incontinence, chest pain, dysuria, fever, headaches, leg pain, numbness, paresis, paresthesias, pelvic pain, perianal numbness, tingling, weakness or weight loss  Risk factors include poor posture (Proper body mechanics with assisting Pt at job)  She has tried NSAIDs, ice, home exercises and heat for the symptoms  The treatment provided moderate relief         The following portions of the patient's history were reviewed and updated as appropriate:   She has a past medical history of Anxiety, Heart attack ([facility] [address] ) (), History of blood transfusion (), History of depression, and Hypertension  ,  does not have any pertinent problems on file  ,   has a past surgical history that includes Gastric bypass; Oophorectomy (Right); Cholecystectomy; Breast surgery (Right, ); and  section (, )  ,  family history includes Diabetes in her brother, mother, and sister; Heart attack in her father; Substance Abuse in her brother  ,   reports that she has never smoked  She has never used smokeless tobacco  She reports current alcohol use  She reports previous drug use ,  is allergic to nsaids; oxycodone-acetaminophen; propoxyphene; and shellfish allergy     Current Outpatient Medications   Medication Sig Dispense Refill    diphenhydrAMINE (Benadryl Allergy) 25 mg capsule Take 25 mg by mouth every 6 (six) hours as needed for itching      olmesartan (BENICAR) 40 mg tablet Take 1 tablet (40 mg total) by mouth daily 30 tablet 0    cyclobenzaprine (FLEXERIL) 10 mg tablet Take 1 tablet (10 mg total) by mouth 3 (three) times a day as needed for muscle spasms 30 tablet 0    metoprolol tartrate (LOPRESSOR) 25 mg tablet Take 1 tablet (25 mg total) by mouth daily 30 tablet 0     No current facility-administered medications for this visit  Review of Systems   Constitutional: Negative for fever, malaise/fatigue and weight loss  Eyes: Negative for blurred vision  Respiratory: Negative for shortness of breath  Cardiovascular: Negative for chest pain, palpitations, orthopnea and PND  Gastrointestinal: Negative for abdominal pain and bowel incontinence  Genitourinary: Negative for bladder incontinence, dysuria and pelvic pain  Musculoskeletal: Positive for back pain  Negative for neck pain  Neurological: Negative for tingling, weakness, numbness, headaches and paresthesias  All other systems reviewed and are negative          Objective:  Vitals:    20 0951   BP: 160/84   BP Location: Left arm   Patient Position: Sitting   Cuff Size: Large   Pulse: 97   Temp: 99 1 °F (37 3 °C)   TempSrc: Tympanic   SpO2: 99%   Weight: 84 8 kg (187 lb)   Height: 5' 1 5" (1 562 m)     Body mass index is 34 76 kg/m²  Physical Exam  Vitals signs and nursing note reviewed  Constitutional:       Appearance: Normal appearance  She is well-developed  HENT:      Head: Normocephalic and atraumatic  Right Ear: Tympanic membrane, ear canal and external ear normal       Left Ear: Tympanic membrane, ear canal and external ear normal       Nose: Nose normal       Mouth/Throat:      Mouth: Mucous membranes are moist       Pharynx: Uvula midline  Eyes:      General: Lids are normal       Conjunctiva/sclera: Conjunctivae normal       Pupils: Pupils are equal, round, and reactive to light  Neck:      Musculoskeletal: Full passive range of motion without pain, normal range of motion and neck supple  Thyroid: No thyroid mass or thyromegaly  Vascular: No JVD  Trachea: Trachea and phonation normal    Cardiovascular:      Rate and Rhythm: Normal rate and regular rhythm  Pulses: Normal pulses  Heart sounds: Normal heart sounds, S1 normal and S2 normal  No murmur  No friction rub  No gallop  Pulmonary:      Effort: Pulmonary effort is normal       Breath sounds: Normal breath sounds  Abdominal:      General: Bowel sounds are normal       Palpations: Abdomen is soft  Tenderness: There is no abdominal tenderness  Genitourinary:     Comments: Deferred   Musculoskeletal: Normal range of motion  Lumbar back: She exhibits tenderness and spasm  Right lower leg: No edema  Left lower leg: No edema  Skin:     General: Skin is warm and dry  Capillary Refill: Capillary refill takes less than 2 seconds  Neurological:      General: No focal deficit present  Mental Status: She is alert and oriented to person, place, and time        Cranial Nerves: Cranial nerves are intact  No cranial nerve deficit  Sensory: Sensation is intact  Motor: Motor function is intact  Coordination: Coordination is intact  Gait: Gait is intact  Deep Tendon Reflexes: Reflexes are normal and symmetric  Psychiatric:         Attention and Perception: Attention and perception normal          Mood and Affect: Mood and affect normal          Speech: Speech normal          Behavior: Behavior normal  Behavior is cooperative  Thought Content:  Thought content normal          Cognition and Memory: Cognition normal          Judgment: Judgment normal

## 2020-09-21 ENCOUNTER — CLINICAL SUPPORT (OUTPATIENT)
Dept: FAMILY MEDICINE CLINIC | Facility: CLINIC | Age: 60
End: 2020-09-21
Payer: COMMERCIAL

## 2020-09-21 VITALS — DIASTOLIC BLOOD PRESSURE: 88 MMHG | SYSTOLIC BLOOD PRESSURE: 160 MMHG

## 2020-09-21 DIAGNOSIS — R39.9 UTI SYMPTOMS: Primary | ICD-10-CM

## 2020-09-21 DIAGNOSIS — N30.00 ACUTE CYSTITIS WITHOUT HEMATURIA: ICD-10-CM

## 2020-09-21 LAB
SL AMB  POCT GLUCOSE, UA: NEGATIVE
SL AMB LEUKOCYTE ESTERASE,UA: 1
SL AMB POCT BILIRUBIN,UA: NEGATIVE
SL AMB POCT BLOOD,UA: NEGATIVE
SL AMB POCT CLARITY,UA: ABNORMAL
SL AMB POCT COLOR,UA: ABNORMAL
SL AMB POCT KETONES,UA: NEGATIVE
SL AMB POCT NITRITE,UA: POSITIVE
SL AMB POCT PH,UA: 5
SL AMB POCT SPECIFIC GRAVITY,UA: 1.02
SL AMB POCT URINE PROTEIN: 1
SL AMB POCT UROBILINOGEN: NORMAL

## 2020-09-21 PROCEDURE — 81001 URINALYSIS AUTO W/SCOPE: CPT

## 2020-09-21 PROCEDURE — 81002 URINALYSIS NONAUTO W/O SCOPE: CPT

## 2020-09-21 RX ORDER — FLUCONAZOLE 150 MG/1
150 TABLET ORAL ONCE
Qty: 1 TABLET | Refills: 0 | Status: SHIPPED | OUTPATIENT
Start: 2020-09-21 | End: 2020-09-21

## 2020-09-21 RX ORDER — CIPROFLOXACIN 500 MG/1
500 TABLET, FILM COATED ORAL EVERY 12 HOURS SCHEDULED
Qty: 10 TABLET | Refills: 0 | Status: SHIPPED | OUTPATIENT
Start: 2020-09-21 | End: 2020-09-26

## 2020-09-22 LAB
BACTERIA UR QL AUTO: ABNORMAL /HPF
BILIRUB UR QL STRIP: NEGATIVE
CLARITY UR: ABNORMAL
COLOR UR: YELLOW
GLUCOSE UR STRIP-MCNC: NEGATIVE MG/DL
HGB UR QL STRIP.AUTO: NEGATIVE
HYALINE CASTS #/AREA URNS LPF: ABNORMAL /LPF
KETONES UR STRIP-MCNC: NEGATIVE MG/DL
LEUKOCYTE ESTERASE UR QL STRIP: NEGATIVE
NITRITE UR QL STRIP: POSITIVE
NON-SQ EPI CELLS URNS QL MICRO: ABNORMAL /HPF
PH UR STRIP.AUTO: 6 [PH]
PROT UR STRIP-MCNC: ABNORMAL MG/DL
RBC #/AREA URNS AUTO: ABNORMAL /HPF
SP GR UR STRIP.AUTO: 1.02 (ref 1–1.03)
UROBILINOGEN UR QL STRIP.AUTO: 0.2 E.U./DL
WBC #/AREA URNS AUTO: ABNORMAL /HPF

## 2020-09-25 DIAGNOSIS — I10 ESSENTIAL HYPERTENSION: ICD-10-CM

## 2020-09-28 RX ORDER — OLMESARTAN MEDOXOMIL 40 MG/1
TABLET ORAL
Qty: 30 TABLET | Refills: 0 | OUTPATIENT
Start: 2020-09-28

## 2020-10-05 ENCOUNTER — OFFICE VISIT (OUTPATIENT)
Dept: FAMILY MEDICINE CLINIC | Facility: CLINIC | Age: 60
End: 2020-10-05
Payer: COMMERCIAL

## 2020-10-05 VITALS
HEIGHT: 62 IN | SYSTOLIC BLOOD PRESSURE: 150 MMHG | DIASTOLIC BLOOD PRESSURE: 82 MMHG | BODY MASS INDEX: 34.41 KG/M2 | HEART RATE: 91 BPM | WEIGHT: 187 LBS

## 2020-10-05 DIAGNOSIS — F32.A ANXIETY AND DEPRESSION: ICD-10-CM

## 2020-10-05 DIAGNOSIS — I10 ESSENTIAL HYPERTENSION: Primary | ICD-10-CM

## 2020-10-05 DIAGNOSIS — E66.09 CLASS 1 OBESITY DUE TO EXCESS CALORIES WITHOUT SERIOUS COMORBIDITY WITH BODY MASS INDEX (BMI) OF 34.0 TO 34.9 IN ADULT: ICD-10-CM

## 2020-10-05 DIAGNOSIS — F41.9 ANXIETY AND DEPRESSION: ICD-10-CM

## 2020-10-05 PROCEDURE — 1036F TOBACCO NON-USER: CPT | Performed by: NURSE PRACTITIONER

## 2020-10-05 PROCEDURE — 3079F DIAST BP 80-89 MM HG: CPT | Performed by: NURSE PRACTITIONER

## 2020-10-05 PROCEDURE — 3077F SYST BP >= 140 MM HG: CPT | Performed by: NURSE PRACTITIONER

## 2020-10-05 PROCEDURE — 99213 OFFICE O/P EST LOW 20 MIN: CPT | Performed by: NURSE PRACTITIONER

## 2020-10-05 RX ORDER — VALSARTAN AND HYDROCHLOROTHIAZIDE 160; 25 MG/1; MG/1
1 TABLET ORAL DAILY
Qty: 30 TABLET | Refills: 0 | Status: SHIPPED | OUTPATIENT
Start: 2020-10-05 | End: 2020-10-28 | Stop reason: SDUPTHER

## 2020-10-05 RX ORDER — METOPROLOL SUCCINATE 25 MG/1
25 TABLET, EXTENDED RELEASE ORAL DAILY
Qty: 30 TABLET | Refills: 0 | Status: SHIPPED | OUTPATIENT
Start: 2020-10-05 | End: 2020-10-12

## 2020-10-11 DIAGNOSIS — I10 ESSENTIAL HYPERTENSION: ICD-10-CM

## 2020-10-12 RX ORDER — METOPROLOL SUCCINATE 25 MG/1
TABLET, EXTENDED RELEASE ORAL
Qty: 30 TABLET | Refills: 0 | Status: SHIPPED | OUTPATIENT
Start: 2020-10-12 | End: 2020-10-28 | Stop reason: SDUPTHER

## 2020-10-28 ENCOUNTER — OFFICE VISIT (OUTPATIENT)
Dept: FAMILY MEDICINE CLINIC | Facility: CLINIC | Age: 60
End: 2020-10-28
Payer: COMMERCIAL

## 2020-10-28 VITALS
SYSTOLIC BLOOD PRESSURE: 152 MMHG | BODY MASS INDEX: 32.34 KG/M2 | TEMPERATURE: 98.5 F | WEIGHT: 174 LBS | DIASTOLIC BLOOD PRESSURE: 90 MMHG | HEART RATE: 120 BPM | OXYGEN SATURATION: 100 %

## 2020-10-28 DIAGNOSIS — I10 ESSENTIAL HYPERTENSION: ICD-10-CM

## 2020-10-28 DIAGNOSIS — E66.09 CLASS 1 OBESITY DUE TO EXCESS CALORIES WITHOUT SERIOUS COMORBIDITY WITH BODY MASS INDEX (BMI) OF 34.0 TO 34.9 IN ADULT: ICD-10-CM

## 2020-10-28 DIAGNOSIS — R40.0 DAYTIME SLEEPINESS: ICD-10-CM

## 2020-10-28 DIAGNOSIS — F41.9 ANXIETY AND DEPRESSION: ICD-10-CM

## 2020-10-28 DIAGNOSIS — I10 ESSENTIAL HYPERTENSION: Primary | ICD-10-CM

## 2020-10-28 DIAGNOSIS — F32.A ANXIETY AND DEPRESSION: ICD-10-CM

## 2020-10-28 PROCEDURE — 3725F SCREEN DEPRESSION PERFORMED: CPT | Performed by: NURSE PRACTITIONER

## 2020-10-28 PROCEDURE — 99213 OFFICE O/P EST LOW 20 MIN: CPT | Performed by: NURSE PRACTITIONER

## 2020-10-28 RX ORDER — METOPROLOL SUCCINATE 50 MG/1
25 TABLET, EXTENDED RELEASE ORAL DAILY
Qty: 30 TABLET | Refills: 0 | Status: SHIPPED | OUTPATIENT
Start: 2020-10-28 | End: 2020-11-02 | Stop reason: SDUPTHER

## 2020-10-28 RX ORDER — VALSARTAN AND HYDROCHLOROTHIAZIDE 160; 25 MG/1; MG/1
TABLET ORAL
Qty: 30 TABLET | Refills: 0 | OUTPATIENT
Start: 2020-10-28

## 2020-10-28 RX ORDER — VALSARTAN AND HYDROCHLOROTHIAZIDE 160; 25 MG/1; MG/1
1 TABLET ORAL DAILY
Qty: 30 TABLET | Refills: 0 | Status: SHIPPED | OUTPATIENT
Start: 2020-10-28 | End: 2020-11-23 | Stop reason: SDUPTHER

## 2020-10-28 RX ORDER — BUSPIRONE HYDROCHLORIDE 10 MG/1
10 TABLET ORAL 2 TIMES DAILY
Qty: 90 TABLET | Refills: 3 | Status: SHIPPED | OUTPATIENT
Start: 2020-10-28 | End: 2021-04-28

## 2020-10-28 RX ORDER — BUSPIRONE HYDROCHLORIDE 10 MG/1
10 TABLET ORAL 2 TIMES DAILY
COMMUNITY
End: 2020-10-28 | Stop reason: SDUPTHER

## 2020-11-02 RX ORDER — METOPROLOL SUCCINATE 50 MG/1
50 TABLET, EXTENDED RELEASE ORAL DAILY
Qty: 30 TABLET | Refills: 1 | Status: SHIPPED | OUTPATIENT
Start: 2020-11-02 | End: 2020-11-23 | Stop reason: SDUPTHER

## 2020-11-23 ENCOUNTER — OFFICE VISIT (OUTPATIENT)
Dept: FAMILY MEDICINE CLINIC | Facility: CLINIC | Age: 60
End: 2020-11-23
Payer: COMMERCIAL

## 2020-11-23 VITALS
WEIGHT: 173.4 LBS | HEART RATE: 93 BPM | OXYGEN SATURATION: 95 % | HEIGHT: 62 IN | TEMPERATURE: 99.2 F | DIASTOLIC BLOOD PRESSURE: 88 MMHG | SYSTOLIC BLOOD PRESSURE: 176 MMHG | BODY MASS INDEX: 31.91 KG/M2

## 2020-11-23 DIAGNOSIS — I10 ESSENTIAL HYPERTENSION: ICD-10-CM

## 2020-11-23 DIAGNOSIS — Z12.11 SCREENING FOR COLON CANCER: Primary | ICD-10-CM

## 2020-11-23 DIAGNOSIS — E66.09 CLASS 1 OBESITY DUE TO EXCESS CALORIES WITHOUT SERIOUS COMORBIDITY WITH BODY MASS INDEX (BMI) OF 34.0 TO 34.9 IN ADULT: ICD-10-CM

## 2020-11-23 DIAGNOSIS — I10 HYPERTENSION, UNSPECIFIED TYPE: ICD-10-CM

## 2020-11-23 PROCEDURE — 99213 OFFICE O/P EST LOW 20 MIN: CPT | Performed by: NURSE PRACTITIONER

## 2020-11-23 PROCEDURE — 3079F DIAST BP 80-89 MM HG: CPT | Performed by: NURSE PRACTITIONER

## 2020-11-23 PROCEDURE — 3077F SYST BP >= 140 MM HG: CPT | Performed by: NURSE PRACTITIONER

## 2020-11-23 PROCEDURE — 1036F TOBACCO NON-USER: CPT | Performed by: NURSE PRACTITIONER

## 2020-11-23 PROCEDURE — 3008F BODY MASS INDEX DOCD: CPT | Performed by: NURSE PRACTITIONER

## 2020-11-23 RX ORDER — VALSARTAN AND HYDROCHLOROTHIAZIDE 160; 25 MG/1; MG/1
1 TABLET ORAL DAILY
Qty: 30 TABLET | Refills: 0 | Status: SHIPPED | OUTPATIENT
Start: 2020-11-23 | End: 2020-12-23

## 2020-11-23 RX ORDER — METOPROLOL SUCCINATE 100 MG/1
100 TABLET, EXTENDED RELEASE ORAL DAILY
Qty: 30 TABLET | Refills: 0 | Status: SHIPPED | OUTPATIENT
Start: 2020-11-23 | End: 2020-12-24

## 2020-11-24 DIAGNOSIS — I10 ESSENTIAL HYPERTENSION: ICD-10-CM

## 2020-11-30 RX ORDER — VALSARTAN AND HYDROCHLOROTHIAZIDE 160; 25 MG/1; MG/1
TABLET ORAL
Qty: 30 TABLET | Refills: 0 | OUTPATIENT
Start: 2020-11-30

## 2020-12-03 ENCOUNTER — LAB (OUTPATIENT)
Dept: LAB | Facility: CLINIC | Age: 60
End: 2020-12-03
Payer: COMMERCIAL

## 2020-12-03 ENCOUNTER — TRANSCRIBE ORDERS (OUTPATIENT)
Dept: LAB | Facility: CLINIC | Age: 60
End: 2020-12-03

## 2020-12-03 DIAGNOSIS — I10 HYPERTENSION, UNSPECIFIED TYPE: ICD-10-CM

## 2020-12-03 DIAGNOSIS — E34.9 ELEVATED PARATHYROID HORMONE: ICD-10-CM

## 2020-12-03 LAB — PTH-INTACT SERPL-MCNC: 174.1 PG/ML (ref 18.4–80.1)

## 2020-12-03 PROCEDURE — 80053 COMPREHEN METABOLIC PANEL: CPT

## 2020-12-03 PROCEDURE — 84100 ASSAY OF PHOSPHORUS: CPT

## 2020-12-03 PROCEDURE — 83970 ASSAY OF PARATHORMONE: CPT

## 2020-12-03 PROCEDURE — 36415 COLL VENOUS BLD VENIPUNCTURE: CPT

## 2020-12-04 ENCOUNTER — TRANSCRIBE ORDERS (OUTPATIENT)
Dept: LAB | Facility: CLINIC | Age: 60
End: 2020-12-04

## 2020-12-04 ENCOUNTER — LAB (OUTPATIENT)
Dept: LAB | Facility: CLINIC | Age: 60
End: 2020-12-04
Payer: COMMERCIAL

## 2020-12-04 DIAGNOSIS — E34.9 ELEVATED PARATHYROID HORMONE: Primary | ICD-10-CM

## 2020-12-04 DIAGNOSIS — I10 HYPERTENSION, UNSPECIFIED TYPE: ICD-10-CM

## 2020-12-04 LAB
ALBUMIN SERPL BCP-MCNC: 4.1 G/DL (ref 3.5–5)
ALP SERPL-CCNC: 92 U/L (ref 46–116)
ALT SERPL W P-5'-P-CCNC: 32 U/L (ref 12–78)
ANION GAP SERPL CALCULATED.3IONS-SCNC: 10 MMOL/L (ref 4–13)
AST SERPL W P-5'-P-CCNC: 84 U/L (ref 5–45)
BILIRUB SERPL-MCNC: 0.42 MG/DL (ref 0.2–1)
BUN SERPL-MCNC: 14 MG/DL (ref 5–25)
CALCIUM SERPL-MCNC: 8.7 MG/DL (ref 8.3–10.1)
CHLORIDE SERPL-SCNC: 102 MMOL/L (ref 100–108)
CO2 SERPL-SCNC: 26 MMOL/L (ref 21–32)
CREAT SERPL-MCNC: 0.85 MG/DL (ref 0.6–1.3)
GFR SERPL CREATININE-BSD FRML MDRD: 87 ML/MIN/1.73SQ M
GLUCOSE SERPL-MCNC: 143 MG/DL (ref 65–140)
PHOSPHATE SERPL-MCNC: 3.9 MG/DL (ref 2.7–4.5)
POTASSIUM SERPL-SCNC: 3.9 MMOL/L (ref 3.5–5.3)
PROT SERPL-MCNC: 7.5 G/DL (ref 6.4–8.2)
SODIUM SERPL-SCNC: 138 MMOL/L (ref 136–145)

## 2020-12-04 PROCEDURE — 83835 ASSAY OF METANEPHRINES: CPT

## 2020-12-11 LAB
METANEPH 24H UR-MRATE: 86 UG/24 HR (ref 36–209)
METANEPHS 24H UR-MCNC: 40 UG/L
NORMETANEPHRINE 24H UR-MCNC: 149 UG/L
NORMETANEPHRINE 24H UR-MRATE: 320 UG/24 HR (ref 131–612)

## 2020-12-23 ENCOUNTER — LAB (OUTPATIENT)
Dept: LAB | Facility: CLINIC | Age: 60
End: 2020-12-23
Payer: COMMERCIAL

## 2020-12-23 ENCOUNTER — TRANSCRIBE ORDERS (OUTPATIENT)
Dept: LAB | Facility: CLINIC | Age: 60
End: 2020-12-23

## 2020-12-23 ENCOUNTER — OFFICE VISIT (OUTPATIENT)
Dept: FAMILY MEDICINE CLINIC | Facility: CLINIC | Age: 60
End: 2020-12-23
Payer: COMMERCIAL

## 2020-12-23 VITALS
BODY MASS INDEX: 32.2 KG/M2 | OXYGEN SATURATION: 100 % | SYSTOLIC BLOOD PRESSURE: 162 MMHG | DIASTOLIC BLOOD PRESSURE: 88 MMHG | HEIGHT: 62 IN | HEART RATE: 78 BPM | TEMPERATURE: 97.7 F | WEIGHT: 175 LBS

## 2020-12-23 DIAGNOSIS — E34.9 ELEVATED PARATHYROID HORMONE: ICD-10-CM

## 2020-12-23 DIAGNOSIS — E66.09 CLASS 1 OBESITY DUE TO EXCESS CALORIES WITHOUT SERIOUS COMORBIDITY WITH BODY MASS INDEX (BMI) OF 34.0 TO 34.9 IN ADULT: ICD-10-CM

## 2020-12-23 DIAGNOSIS — I10 RESISTANT HYPERTENSION: Primary | ICD-10-CM

## 2020-12-23 LAB
ALBUMIN SERPL BCP-MCNC: 4.2 G/DL (ref 3.5–5)
ANION GAP SERPL CALCULATED.3IONS-SCNC: 9 MMOL/L (ref 4–13)
BUN SERPL-MCNC: 16 MG/DL (ref 5–25)
CALCIUM SERPL-MCNC: 8.8 MG/DL (ref 8.3–10.1)
CHLORIDE SERPL-SCNC: 108 MMOL/L (ref 100–108)
CO2 SERPL-SCNC: 25 MMOL/L (ref 21–32)
CREAT SERPL-MCNC: 0.84 MG/DL (ref 0.6–1.3)
GFR SERPL CREATININE-BSD FRML MDRD: 89 ML/MIN/1.73SQ M
GLUCOSE P FAST SERPL-MCNC: 114 MG/DL (ref 65–99)
MAGNESIUM SERPL-MCNC: 1.6 MG/DL (ref 1.6–2.6)
PHOSPHATE SERPL-MCNC: 4.1 MG/DL (ref 2.7–4.5)
POTASSIUM SERPL-SCNC: 3.8 MMOL/L (ref 3.5–5.3)
SODIUM SERPL-SCNC: 142 MMOL/L (ref 136–145)

## 2020-12-23 PROCEDURE — 82384 ASSAY THREE CATECHOLAMINES: CPT | Performed by: NURSE PRACTITIONER

## 2020-12-23 PROCEDURE — 3008F BODY MASS INDEX DOCD: CPT | Performed by: NURSE PRACTITIONER

## 2020-12-23 PROCEDURE — 84244 ASSAY OF RENIN: CPT | Performed by: NURSE PRACTITIONER

## 2020-12-23 PROCEDURE — 82088 ASSAY OF ALDOSTERONE: CPT | Performed by: NURSE PRACTITIONER

## 2020-12-23 PROCEDURE — 80069 RENAL FUNCTION PANEL: CPT | Performed by: NURSE PRACTITIONER

## 2020-12-23 PROCEDURE — 3079F DIAST BP 80-89 MM HG: CPT | Performed by: NURSE PRACTITIONER

## 2020-12-23 PROCEDURE — 36415 COLL VENOUS BLD VENIPUNCTURE: CPT | Performed by: NURSE PRACTITIONER

## 2020-12-23 PROCEDURE — 1036F TOBACCO NON-USER: CPT | Performed by: NURSE PRACTITIONER

## 2020-12-23 PROCEDURE — 99213 OFFICE O/P EST LOW 20 MIN: CPT | Performed by: NURSE PRACTITIONER

## 2020-12-23 PROCEDURE — 83735 ASSAY OF MAGNESIUM: CPT

## 2020-12-23 PROCEDURE — 3077F SYST BP >= 140 MM HG: CPT | Performed by: NURSE PRACTITIONER

## 2020-12-23 RX ORDER — VALSARTAN AND HYDROCHLOROTHIAZIDE 320; 25 MG/1; MG/1
1 TABLET, FILM COATED ORAL DAILY
Qty: 30 TABLET | Refills: 0 | Status: SHIPPED | OUTPATIENT
Start: 2020-12-23 | End: 2021-01-13 | Stop reason: SDUPTHER

## 2020-12-24 DIAGNOSIS — I10 ESSENTIAL HYPERTENSION: ICD-10-CM

## 2020-12-24 RX ORDER — METOPROLOL SUCCINATE 100 MG/1
TABLET, EXTENDED RELEASE ORAL
Qty: 30 TABLET | Refills: 0 | Status: SHIPPED | OUTPATIENT
Start: 2020-12-24 | End: 2021-01-13 | Stop reason: SDUPTHER

## 2020-12-28 LAB
DOPAMINE 24H UR-MRATE: <30 PG/ML (ref 0–48)
EPINEPH PLAS-MCNC: 23 PG/ML (ref 0–62)
NOREPINEPH PLAS-MCNC: 557 PG/ML (ref 0–874)

## 2020-12-31 LAB — ALDOST SERPL-MCNC: 20.5 NG/DL (ref 0–30)

## 2021-01-08 ENCOUNTER — HOSPITAL ENCOUNTER (OUTPATIENT)
Dept: ULTRASOUND IMAGING | Facility: CLINIC | Age: 61
Discharge: HOME | End: 2021-01-08
Payer: COMMERCIAL

## 2021-01-08 DIAGNOSIS — E34.9 ELEVATED PARATHYROID HORMONE: ICD-10-CM

## 2021-01-08 PROCEDURE — 76536 US EXAM OF HEAD AND NECK: CPT

## 2021-01-13 ENCOUNTER — OFFICE VISIT (OUTPATIENT)
Dept: FAMILY MEDICINE CLINIC | Facility: CLINIC | Age: 61
End: 2021-01-13
Payer: COMMERCIAL

## 2021-01-13 VITALS
DIASTOLIC BLOOD PRESSURE: 84 MMHG | BODY MASS INDEX: 32.39 KG/M2 | TEMPERATURE: 98.8 F | SYSTOLIC BLOOD PRESSURE: 136 MMHG | HEART RATE: 79 BPM | OXYGEN SATURATION: 97 % | HEIGHT: 62 IN | WEIGHT: 176 LBS

## 2021-01-13 DIAGNOSIS — E66.09 CLASS 1 OBESITY DUE TO EXCESS CALORIES WITHOUT SERIOUS COMORBIDITY WITH BODY MASS INDEX (BMI) OF 34.0 TO 34.9 IN ADULT: Primary | ICD-10-CM

## 2021-01-13 DIAGNOSIS — I10 ESSENTIAL HYPERTENSION: ICD-10-CM

## 2021-01-13 DIAGNOSIS — I10 RESISTANT HYPERTENSION: ICD-10-CM

## 2021-01-13 PROCEDURE — 1036F TOBACCO NON-USER: CPT | Performed by: NURSE PRACTITIONER

## 2021-01-13 PROCEDURE — 3075F SYST BP GE 130 - 139MM HG: CPT | Performed by: NURSE PRACTITIONER

## 2021-01-13 PROCEDURE — 3079F DIAST BP 80-89 MM HG: CPT | Performed by: NURSE PRACTITIONER

## 2021-01-13 PROCEDURE — 99213 OFFICE O/P EST LOW 20 MIN: CPT | Performed by: NURSE PRACTITIONER

## 2021-01-13 PROCEDURE — 3008F BODY MASS INDEX DOCD: CPT | Performed by: NURSE PRACTITIONER

## 2021-01-13 RX ORDER — METOPROLOL SUCCINATE 100 MG/1
100 TABLET, EXTENDED RELEASE ORAL DAILY
Qty: 90 TABLET | Refills: 1 | Status: SHIPPED | OUTPATIENT
Start: 2021-01-13 | End: 2021-08-02 | Stop reason: SDUPTHER

## 2021-01-13 RX ORDER — VALSARTAN AND HYDROCHLOROTHIAZIDE 320; 25 MG/1; MG/1
1 TABLET, FILM COATED ORAL DAILY
Qty: 90 TABLET | Refills: 1 | Status: SHIPPED | OUTPATIENT
Start: 2021-01-13 | End: 2021-08-02 | Stop reason: SDUPTHER

## 2021-01-13 NOTE — PROGRESS NOTES
Assessment/Plan:    Problem List Items Addressed This Visit     Class 1 obesity due to excess calories without serious comorbidity with body mass index (BMI) of 34 0 to 34 9 in adult - Primary    Essential hypertension    Relevant Medications    valsartan-hydrochlorothiazide (DIOVAN-HCT) 320-25 MG per tablet    metoprolol succinate (TOPROL-XL) 100 mg 24 hr tablet      Other Visit Diagnoses     Resistant hypertension        continue ARB/HCT dosing  US renal pending  rennin direct, renal function panel, aldosterone - WNL  US thyroid/parathyroid - WNL No Bx nodules    Relevant Medications    valsartan-hydrochlorothiazide (DIOVAN-HCT) 320-25 MG per tablet    metoprolol succinate (TOPROL-XL) 100 mg 24 hr tablet           Diagnoses and all orders for this visit:    Class 1 obesity due to excess calories without serious comorbidity with body mass index (BMI) of 34 0 to 34 9 in adult    Resistant hypertension  Comments:  continue ARB/HCT dosing  US renal pending  rennin direct, renal function panel, aldosterone - WNL  US thyroid/parathyroid - WNL No Bx nodules  Orders:  -     valsartan-hydrochlorothiazide (DIOVAN-HCT) 320-25 MG per tablet; Take 1 tablet by mouth daily    Essential hypertension  -     metoprolol succinate (TOPROL-XL) 100 mg 24 hr tablet; Take 1 tablet (100 mg total) by mouth daily        No problem-specific Assessment & Plan notes found for this encounter  Subjective:      Patient ID: Elvia Chapa is a 64 y o  female  Elvia Chapa presents for HTN F/U  She has had multiple medication adjustments, made changes in her diet, increased water intake, decreased ETOH,  and started exercising (walking) with her friend  Todays BP is WNL  Pt is aware fo US neck findings, she will have US renal 1/21 and see Endo appt is pending scheduling  Hypertension  This is a chronic problem  The current episode started more than 1 year ago  The problem is controlled  Associated symptoms include anxiety  Pertinent negatives include no blurred vision, chest pain, headaches, malaise/fatigue, neck pain, orthopnea, palpitations, peripheral edema, PND, shortness of breath or sweats  There are no associated agents to hypertension  Past treatments include angiotensin blockers, diuretics and beta blockers  The current treatment provides significant improvement  There are no compliance problems  There is no history of angina, kidney disease, CAD/MI, CVA, heart failure, left ventricular hypertrophy, PVD or retinopathy  There is no history of chronic renal disease, a hypertension causing med, sleep apnea or a thyroid problem  The following portions of the patient's history were reviewed and updated as appropriate:   She has a past medical history of Anxiety, Heart attack ([facility] [address] ) (), History of blood transfusion (), History of depression, and Hypertension  ,  does not have any pertinent problems on file  ,   has a past surgical history that includes Gastric bypass; Oophorectomy (Right); Cholecystectomy; Breast surgery (Right, ); and  section (, )  ,  family history includes Diabetes in her brother, mother, and sister; Heart attack in her father; Substance Abuse in her brother  ,   reports that she has never smoked  She has never used smokeless tobacco  She reports current alcohol use  She reports previous drug use ,  is allergic to nsaids; oxycodone-acetaminophen; propoxyphene; and shellfish allergy     Current Outpatient Medications   Medication Sig Dispense Refill    busPIRone (BUSPAR) 10 mg tablet Take 1 tablet (10 mg total) by mouth 2 (two) times a day 90 tablet 3    diphenhydrAMINE (Benadryl Allergy) 25 mg capsule Take 25 mg by mouth every 6 (six) hours as needed for itching      metoprolol succinate (TOPROL-XL) 100 mg 24 hr tablet Take 1 tablet (100 mg total) by mouth daily 90 tablet 1    valsartan-hydrochlorothiazide (DIOVAN-HCT) 320-25 MG per tablet Take 1 tablet by mouth daily 90 tablet 1     No current facility-administered medications for this visit  BMI Counseling: Body mass index is 32 72 kg/m²  The BMI is above normal  Nutrition recommendations include decreasing portion sizes, encouraging healthy choices of fruits and vegetables, decreasing fast food intake, consuming healthier snacks, limiting drinks that contain sugar, moderation in carbohydrate intake, increasing intake of lean protein, reducing intake of saturated and trans fat and reducing intake of cholesterol  Exercise recommendations include moderate physical activity 150 minutes/week, exercising 3-5 times per week and strength training exercises  No pharmacotherapy was ordered  Review of Systems   Constitutional: Negative  Negative for malaise/fatigue  HENT: Negative  Eyes: Negative  Negative for blurred vision  Respiratory: Negative  Negative for shortness of breath  Cardiovascular: Negative  Negative for chest pain, palpitations, orthopnea and PND  Gastrointestinal: Negative  Endocrine: Negative  Genitourinary: Negative  Musculoskeletal: Negative  Negative for neck pain  Skin: Negative  Allergic/Immunologic: Negative  Neurological: Negative  Negative for headaches  Hematological: Negative  Psychiatric/Behavioral: Negative  Objective:  Vitals:    01/13/21 0700 01/13/21 0717   BP: 140/84 136/84   BP Location: Left arm    Patient Position: Sitting    Cuff Size: Large    Pulse: 79    Temp: 98 8 °F (37 1 °C)    TempSrc: Tympanic    SpO2: 97%    Weight: 79 8 kg (176 lb)    Height: 5' 1 5" (1 562 m)      Body mass index is 32 72 kg/m²  Physical Exam  Vitals signs and nursing note reviewed  Constitutional:       Appearance: Normal appearance  She is well-developed  HENT:      Head: Normocephalic and atraumatic        Right Ear: Tympanic membrane, ear canal and external ear normal       Left Ear: Tympanic membrane, ear canal and external ear normal       Nose: Nose normal       Mouth/Throat:      Mouth: Mucous membranes are moist       Pharynx: Uvula midline  Eyes:      General: Lids are normal       Conjunctiva/sclera: Conjunctivae normal       Pupils: Pupils are equal, round, and reactive to light  Neck:      Musculoskeletal: Full passive range of motion without pain, normal range of motion and neck supple  Thyroid: No thyroid mass or thyromegaly  Vascular: No JVD  Trachea: Trachea and phonation normal    Cardiovascular:      Rate and Rhythm: Normal rate and regular rhythm  Pulses: Normal pulses  Heart sounds: Normal heart sounds, S1 normal and S2 normal  No murmur  No friction rub  No gallop  Pulmonary:      Effort: Pulmonary effort is normal       Breath sounds: Normal breath sounds  Abdominal:      General: Bowel sounds are normal       Palpations: Abdomen is soft  Tenderness: There is no abdominal tenderness  Genitourinary:     Comments: Deferred   Musculoskeletal: Normal range of motion  Right lower leg: No edema  Left lower leg: No edema  Skin:     General: Skin is warm and dry  Capillary Refill: Capillary refill takes less than 2 seconds  Neurological:      General: No focal deficit present  Mental Status: She is alert and oriented to person, place, and time  Cranial Nerves: Cranial nerves are intact  No cranial nerve deficit  Sensory: Sensation is intact  Motor: Motor function is intact  Coordination: Coordination is intact  Gait: Gait is intact  Deep Tendon Reflexes: Reflexes are normal and symmetric  Psychiatric:         Attention and Perception: Attention and perception normal          Mood and Affect: Mood and affect normal          Speech: Speech normal          Behavior: Behavior normal  Behavior is cooperative  Thought Content:  Thought content normal          Cognition and Memory: Cognition normal          Judgment: Judgment normal

## 2021-01-13 NOTE — PATIENT INSTRUCTIONS
Hypertension   AMBULATORY CARE:   Hypertension  is high blood pressure (BP)  Your BP is the force of your blood moving against the walls of your arteries  Normal BP is less than 120/80  Prehypertension is between 120/80 and 139/89  Hypertension is 140/90 or higher  Hypertension causes your BP to get so high that your heart has to work much harder than normal  This can damage your heart  You can control hypertension with a healthy lifestyle or medicines  A controlled blood pressure helps protect your organs, such as your heart, lungs, brain, and kidneys  Common symptoms include the following:   · Headache     · Blurred vision     · Chest pain     · Dizziness or weakness     · Trouble breathing    · Nosebleeds  Call 911 for any of the following:   · You have discomfort in your chest that feels like squeezing, pressure, fullness, or pain  · You become confused or have difficulty speaking  · You suddenly feel lightheaded or have trouble breathing  · You have pain or discomfort in your back, neck, jaw, stomach, or arm  Seek care immediately if:   · You have a severe headache or vision loss  · You have weakness in an arm or leg  Contact your healthcare provider if:   · You feel faint, dizzy, confused, or drowsy  · You have been taking your BP medicine and your BP is still higher than your healthcare provider says it should be  · You have questions or concerns about your condition or care  Treatment for hypertension  may include medicine to lower your BP and lower your cholesterol level  A low cholesterol level helps prevent heart disease and makes it easier to control your blood pressure  You may also need to make lifestyle changes  Take your medicine exactly as directed  Manage hypertension:  Talk with your healthcare provider about these and other ways to manage hypertension:  · Check your BP at home  Sit and rest for 5 minutes before you take your BP   Extend your arm and support it on a flat surface  Your arm should be at the same level as your heart  Follow the directions that came with your BP monitor  If possible, take at least 2 BP readings each time  Take your BP at least twice a day at the same times each day, such as morning and evening  Keep a record of your BP readings and bring it to your follow-up visits  Ask your healthcare provider what your BP should be  · Limit sodium (salt) as directed  Too much sodium can affect your fluid balance  Check labels to find low-sodium or no-salt-added foods  Some low-sodium foods use potassium salts for flavor  Too much potassium can also cause health problems  Your healthcare provider will tell you how much sodium and potassium are safe for you to have in a day  He or she may recommend that you limit sodium to 2,300 mg a day  · Follow the meal plan recommended by your healthcare provider  A dietitian or your provider can give you more information on low-sodium plans or the DASH (Dietary Approaches to Stop Hypertension) eating plan  The DASH plan is low in sodium, unhealthy fats, and total fat  It is high in potassium, calcium, and fiber  · Exercise to maintain a healthy weight  Exercise at least 30 minutes per day, on most days of the week  This will help decrease your blood pressure  Ask your healthcare provider about the best exercise plan for you  · Decrease stress  This may help lower your BP  Learn ways to relax, such as deep breathing or listening to music  · Limit alcohol  Women should limit alcohol to 1 drink a day  Men should limit alcohol to 2 drinks a day  A drink of alcohol is 12 ounces of beer, 5 ounces of wine, or 1½ ounces of liquor  · Do not smoke  Nicotine and other chemicals in cigarettes and cigars can increase your BP and also cause lung damage  Ask your healthcare provider for information if you currently smoke and need help to quit  E-cigarettes or smokeless tobacco still contain nicotine  Talk to your healthcare provider before you use these products  · Manage any other health conditions you have  Health conditions such as diabetes can increase your risk for hypertension  Follow your healthcare provider's instructions and take all your medicines as directed  Follow up with your healthcare provider as directed: You will need to return to have your BP checked and to have other lab tests done  Write down your questions so you remember to ask them during your visits  © 2017 [address] [employer] Information is for End User's use only and may not be sold, redistributed or otherwise used for commercial purposes  All illustrations and images included in CareNotes® are the copyrighted property of A D A M [employer] Inc  or Marty Jasbir  The above information is an  only  It is not intended as medical advice for individual conditions or treatments  Talk to your doctor, nurse or pharmacist before following any medical regimen to see if it is safe and effective for you  DASH Eating Plan   AMBULATORY CARE:   The DASH (Dietary Approaches to Stop Hypertension) Eating Plan  is designed to help prevent or lower high blood pressure  It can also help to lower LDL (bad) cholesterol and decrease your risk for heart disease  The plan is low in sodium, sugar, unhealthy fats, and total fat  It is high in potassium, calcium, magnesium, and fiber  These nutrients are added when you eat more fruits, vegetables, and whole grains  Your sodium limit each day: Your dietitian will tell you how much sodium is safe for you to have each day  People with high blood pressure should have no more than 1,500 to 2,300 mg of sodium in a day  A teaspoon (tsp) of salt has 2,300 mg of sodium  This may seem like a difficult goal, but small changes to the foods you eat can make a big difference  Your healthcare provider or dietitian can help you create a meal plan that follows your sodium limit    How to limit sodium:   · Read food labels  Food labels can help you choose foods that are low in sodium  The amount of sodium is listed in milligrams (mg)  The % Daily Value (DV) column tells you how much of your daily needs are met by 1 serving of the food for each nutrient listed  Choose foods that have less than 5% of the DV of sodium  These foods are considered low in sodium  Foods that have 20% or more of the DV of sodium are considered high in sodium  Avoid foods that have more than 300 mg of sodium in each serving  Choose foods that say low-sodium, reduced-sodium, or no salt added on the food label  · Avoid salt  Do not salt food at the table, and add very little salt to foods during cooking  Use herbs and spices, such as onions, garlic, and salt-free seasonings to add flavor to foods  Try lemon or lime juice or vinegar to give foods a tart flavor  Use hot peppers or a small amount of hot pepper sauce to add a spicy flavor to foods  · Ask about salt substitutes  Ask your healthcare provider if you may use salt substitutes  Some salt substitutes have ingredients that can be harmful if you have certain health conditions  · Choose foods carefully at restaurants  Meals from restaurants, especially fast food restaurants, are often high in sodium  Some restaurants have nutrition information that tells you the amount of sodium in their foods  Ask to have your food prepared with less, or no salt  What you need to know about fats:   · Include healthy fats  Examples are unsaturated fats and omega-3 fatty acids  Unsaturated fats are found in soybean, canola, olive, or sunflower oil, and liquid and soft tub margarines  Omega-3 fatty acids are found in fatty fish, such as salmon, tuna, mackerel, and sardines  It is also found in flaxseed oil and ground flaxseed  · Avoid unhealthy fats  Do not eat unhealthy fats, such as saturated fats and trans fats   Saturated fats are found in foods that contain fat from animals  Examples are fatty meats, whole milk, butter, cream, and other dairy foods  It is also found in shortening, stick margarine, palm oil, and coconut oil  Trans fats are found in fried foods, crackers, chips, and baked goods made with margarine or shortening  Foods to include: With the DASH eating plan, you need to eat a certain number of servings from each food group  This will help you get enough of certain nutrients and limit others  The amount of servings you should eat depends on how many calories you need  Your dietitian can tell you how many calories you need  The number of servings listed next to the food groups below are for people who need about 2,000 calories each day    · Grains:  6 to 8 servings (3 of these servings should be whole-grain foods)    ¨ 1 slice of whole-grain bread     ¨ 1 ounce of dry cereal    ¨ ½ cup of cooked cereal, pasta, or brown rice    · Vegetables and fruits:  4 to 5 servings of fruits and 4 to 5 servings of vegetables    ¨ 1 medium fruit    ¨ ½ cup of frozen, canned (no added salt), or chopped fresh vegetables     ¨ ½ cup of fresh, frozen, dried, or canned fruit (canned in light syrup or fruit juice)    ¨ ½ cup of vegetable or fruit juice    · Dairy:  2 to 3 servings    ¨ 1 cup of nonfat (skim) or 1% milk    ¨ 1½ ounces of fat-free or low-fat cheese    ¨ 6 ounces of nonfat or low-fat yogurt    · Lean meat, poultry, and fish:  6 ounces or less    [employer] (chicken, turkey) with no skin    ¨ Fish (especially fatty fish, such as salmon, fresh tuna, or mackerel)    ¨ Lean beef and pork (loin, round, extra lean hamburger)    ¨ Egg whites and egg substitutes    · Nuts, seeds, and legumes:  4 to 5 servings each week    ¨ ½ cup of cooked beans and peas    ¨ 1½ ounces of unsalted nuts    ¨ 2 tablespoons of peanut butter or seeds    · Sweets and added sugars:  5 or less each week    ¨ 1 tablespoon of sugar, jelly, or jam    ¨ ½ cup of sorbet or gelatin    ¨ 1 cup of lemonade    · Fats:  2 to 3 servings each week    ¨ 1 teaspoon of soft margarine or vegetable oil    ¨ 1 tablespoon of mayonnaise    ¨ 2 tablespoons of salad dressing  Foods to avoid:   · Grains:      [employer], such as doughnuts, pastries, cookies, and biscuits (high in fat and sugar)    ¨ Mixes for cornbread and biscuits, packaged foods, such as bread stuffing, rice and pasta mixes, macaroni and cheese, and instant cereals (high in sodium)    · Fruits and vegetables:      ¨ Regular, canned vegetables (high in sodium)    ¨ Sauerkraut, pickled vegetables, and other foods prepared in brine (high in sodium)    ¨ Fried vegetables or vegetables in butter or high-fat sauces    ¨ Fruit in cream or butter sauce (high in fat)    · Dairy:      ¨ Whole milk, 2% milk, and cream (high in fat)    ¨ Regular cheese and processed cheese (high in fat and sodium)    · Meats and protein foods:      ¨ Smoked or cured meat, such as corned beef, clement, ham, hot dogs, and sausage (high in fat and sodium)    ¨ Canned beans and canned meats or spreads, such as potted meats, sardines, anchovies, and imitation seafood (high in sodium)    ¨ Deli or lunch meats, such as bologna, ham, turkey, and roast beef (high in sodium)    ¨ High-fat meat (T-bone steak, regular hamburger, and ribs)    ¨ Whole eggs and egg yolks (high in fat)    · Other:      ¨ Seasonings made with salt, such as garlic salt, celery salt, onion salt, seasoned salt, meat tenderizers, and monosodium glutamate (MSG)    ¨ Miso soup and canned or dried soup mixes (high in sodium)    ¨ Regular soy sauce, barbecue sauce, teriyaki sauce, steak sauce, Worcestershire sauce, and most flavored vinegars (high in sodium)    ¨ Regular condiments, such as mustard, ketchup, and salad dressings (high in sodium)    ¨ Gravy and sauces, such as Leon or cheese sauces (high in sodium and fat)    ¨ Drinks high in sugar, such as soda or fruit drinks    [employer] foods, such as salted chips, popcorn, pretzels, pork rinds, salted crackers, and salted nuts    ¨ Frozen foods, such as dinners, entrees, vegetables with sauces, and breaded meats (high in sodium)  Other guidelines to follow:   · Maintain a healthy weight  Your risk for heart disease is higher if you are overweight  Your healthcare provider may suggest that you lose weight if you are overweight  You can lose weight by eating fewer calories and foods that have added sugars and fat  The DASH meal plan can help you do this  Decrease calories by eating smaller portions at each meal and fewer snacks  Ask your healthcare provider for more information about how to lose weight  · Exercise regularly  Regular exercise can help you reach or maintain a healthy weight  Regular exercise can also help decrease your blood pressure and improve your cholesterol levels  Get 30 minutes or more of moderate exercise each day of the week  To lose weight, get at least 60 minutes of exercise  Talk to your healthcare provider about the best exercise program for you  · Limit alcohol  Women should limit alcohol to 1 drink a day  Men should limit alcohol to 2 drinks a day  A drink of alcohol is 12 ounces of beer, 5 ounces of wine, or 1½ ounces of liquor  © 2017 [address] [facility] Information is for End User's use only and may not be sold, redistributed or otherwise used for commercial purposes  All illustrations and images included in CareNotes® are the copyrighted property of A D A M , Inc  or Marty Martell  The above information is an  only  It is not intended as medical advice for individual conditions or treatments  Talk to your doctor, nurse or pharmacist before following any medical regimen to see if it is safe and effective for you

## 2021-01-20 ENCOUNTER — TELEPHONE (OUTPATIENT)
Dept: ENDOCRINOLOGY | Facility: CLINIC | Age: 61
End: 2021-01-20

## 2021-01-20 NOTE — TELEPHONE ENCOUNTER
Called pt and advised she missed her appt today  She notes she thought her appt was for tomorrow   Appt rescheduled to 2/10 at 8:45 am

## 2021-02-03 NOTE — PROGRESS NOTES
Assessment/Plan:    Problem List Items Addressed This Visit     None      Visit Diagnoses     UTI symptoms    -  Primary    Relevant Medications    nitrofurantoin (MACROBID) 100 mg capsule    Other Relevant Orders    POCT urine dip (Completed)    UA w Reflex to Microscopic w Reflex to Culture - Clinic Collect    Hematuria, unspecified type        Relevant Medications    nitrofurantoin (MACROBID) 100 mg capsule    Other Relevant Orders    POCT urine dip (Completed)    UA w Reflex to Microscopic w Reflex to Culture - Clinic Collect    Dysuria        Relevant Medications    nitrofurantoin (MACROBID) 100 mg capsule    Other Relevant Orders    POCT urine dip (Completed)    UA w Reflex to Microscopic w Reflex to Culture - Clinic Collect    Acute cystitis with hematuria        Relevant Medications    nitrofurantoin (MACROBID) 100 mg capsule           Diagnoses and all orders for this visit:    UTI symptoms  -     POCT urine dip  -     UA w Reflex to Microscopic w Reflex to Culture - Clinic Collect  -     nitrofurantoin (MACROBID) 100 mg capsule; Take 1 capsule (100 mg total) by mouth 2 (two) times a day for 5 days    Hematuria, unspecified type  -     POCT urine dip  -     UA w Reflex to Microscopic w Reflex to Culture - Clinic Collect  -     nitrofurantoin (MACROBID) 100 mg capsule; Take 1 capsule (100 mg total) by mouth 2 (two) times a day for 5 days    Dysuria  -     POCT urine dip  -     UA w Reflex to Microscopic w Reflex to Culture - Clinic Collect  -     nitrofurantoin (MACROBID) 100 mg capsule; Take 1 capsule (100 mg total) by mouth 2 (two) times a day for 5 days    Acute cystitis with hematuria  -     nitrofurantoin (MACROBID) 100 mg capsule; Take 1 capsule (100 mg total) by mouth 2 (two) times a day for 5 days        No problem-specific Assessment & Plan notes found for this encounter  Subjective:      Patient ID: Earnest Moody is a 64 y o  female  Onset Saturday   With every urination noticing blood on toilet tissue  States he drinks 6-7 16oz water bottles  Urinary Tract Infection   This is a new problem  The problem occurs every urination  The problem has been unchanged  The quality of the pain is described as burning  There has been no fever  She is sexually active  There is no history of pyelonephritis  Associated symptoms include hematuria  She has tried nothing for the symptoms  There is no history of catheterization, kidney stones, recurrent UTIs, a single kidney, urinary stasis or a urological procedure  The following portions of the patient's history were reviewed and updated as appropriate:   She has a past medical history of Anxiety, Heart attack ([facility] [address] ) (), History of blood transfusion (), History of depression, and Hypertension  ,  does not have any pertinent problems on file  ,   has a past surgical history that includes Gastric bypass; Oophorectomy (Right); Cholecystectomy; Breast surgery (Right, ); and  section (, )  ,  family history includes Diabetes in her brother, mother, and sister; Heart attack in her father; Substance Abuse in her brother  ,   reports that she has never smoked  She has never used smokeless tobacco  She reports current alcohol use  She reports previous drug use ,  is allergic to nsaids; oxycodone-acetaminophen; propoxyphene; and shellfish allergy     Current Outpatient Medications   Medication Sig Dispense Refill    busPIRone (BUSPAR) 10 mg tablet Take 1 tablet (10 mg total) by mouth 2 (two) times a day 90 tablet 3    diphenhydrAMINE (Benadryl Allergy) 25 mg capsule Take 25 mg by mouth every 6 (six) hours as needed for itching      metoprolol succinate (TOPROL-XL) 100 mg 24 hr tablet Take 1 tablet (100 mg total) by mouth daily 90 tablet 1    valsartan-hydrochlorothiazide (DIOVAN-HCT) 320-25 MG per tablet Take 1 tablet by mouth daily 90 tablet 1    nitrofurantoin (MACROBID) 100 mg capsule Take 1 capsule (100 mg total) by mouth 2 (two) times a day for 5 days 10 capsule 0     No current facility-administered medications for this visit  Review of Systems   Genitourinary: Positive for dysuria and hematuria  All other systems reviewed and are negative  Objective:  Vitals:    02/04/21 0723   BP: 136/80   BP Location: Left arm   Patient Position: Sitting   Cuff Size: Large   Pulse: 78   Temp: (!) 97 1 °F (36 2 °C)   TempSrc: Tympanic   SpO2: 100%   Weight: 75 8 kg (167 lb)   Height: 5' 1 5" (1 562 m)     Body mass index is 31 04 kg/m²  Physical Exam  Vitals signs and nursing note reviewed  Constitutional:       Appearance: Normal appearance  She is well-developed  HENT:      Head: Normocephalic and atraumatic  Right Ear: Tympanic membrane, ear canal and external ear normal       Left Ear: Tympanic membrane, ear canal and external ear normal       Nose: Nose normal       Mouth/Throat:      Mouth: Mucous membranes are moist       Pharynx: Uvula midline  Eyes:      General: Lids are normal       Conjunctiva/sclera: Conjunctivae normal       Pupils: Pupils are equal, round, and reactive to light  Neck:      Musculoskeletal: Full passive range of motion without pain, normal range of motion and neck supple  Thyroid: No thyroid mass or thyromegaly  Vascular: No JVD  Trachea: Trachea and phonation normal    Cardiovascular:      Rate and Rhythm: Normal rate and regular rhythm  Pulses: Normal pulses  Heart sounds: Normal heart sounds, S1 normal and S2 normal  No murmur  No friction rub  No gallop  Pulmonary:      Effort: Pulmonary effort is normal       Breath sounds: Normal breath sounds  Abdominal:      General: Bowel sounds are normal       Palpations: Abdomen is soft  Tenderness: There is no abdominal tenderness  Genitourinary:     Comments: Deferred   Musculoskeletal: Normal range of motion  Right lower leg: No edema  Left lower leg: No edema     Skin:     General: Skin is warm and dry  Capillary Refill: Capillary refill takes less than 2 seconds  Neurological:      General: No focal deficit present  Mental Status: She is alert and oriented to person, place, and time  Cranial Nerves: Cranial nerves are intact  No cranial nerve deficit  Sensory: Sensation is intact  Motor: Motor function is intact  Coordination: Coordination is intact  Gait: Gait is intact  Deep Tendon Reflexes: Reflexes are normal and symmetric  Psychiatric:         Attention and Perception: Attention and perception normal          Mood and Affect: Mood and affect normal          Speech: Speech normal          Behavior: Behavior normal  Behavior is cooperative  Thought Content:  Thought content normal          Cognition and Memory: Cognition normal          Judgment: Judgment normal          Office Visit on 02/04/2021   Component Date Value Ref Range Status    LEUKOCYTE ESTERASE,UA 02/04/2021 Negative   Final    NITRITE,UA 02/04/2021 Negative   Final    SL AMB POCT UROBILINOGEN 02/04/2021 Normal   Final    POCT URINE PROTEIN 02/04/2021 Trace +   Final     PH,UA 02/04/2021 6 0   Final    BLOOD,UA 02/04/2021 Trace +   Final    SPECIFIC GRAVITY,UA 02/04/2021 1 010   Final    KETONES,UA 02/04/2021 Negative   Final    BILIRUBIN,UA 02/04/2021 Negative   Final    GLUCOSE, UA 02/04/2021 Negative   Final     COLOR,UA 02/04/2021 Yellow   Final    CLARITY,UA 02/04/2021 Cloudy   Final

## 2021-02-04 ENCOUNTER — OFFICE VISIT (OUTPATIENT)
Dept: FAMILY MEDICINE CLINIC | Facility: CLINIC | Age: 61
End: 2021-02-04
Payer: COMMERCIAL

## 2021-02-04 VITALS
WEIGHT: 167 LBS | SYSTOLIC BLOOD PRESSURE: 136 MMHG | OXYGEN SATURATION: 100 % | HEIGHT: 62 IN | BODY MASS INDEX: 30.73 KG/M2 | HEART RATE: 78 BPM | TEMPERATURE: 97.1 F | DIASTOLIC BLOOD PRESSURE: 80 MMHG

## 2021-02-04 DIAGNOSIS — N30.01 ACUTE CYSTITIS WITH HEMATURIA: ICD-10-CM

## 2021-02-04 DIAGNOSIS — R39.9 UTI SYMPTOMS: Primary | ICD-10-CM

## 2021-02-04 DIAGNOSIS — R30.0 DYSURIA: ICD-10-CM

## 2021-02-04 DIAGNOSIS — R31.9 HEMATURIA, UNSPECIFIED TYPE: ICD-10-CM

## 2021-02-04 LAB
BACTERIA UR QL AUTO: ABNORMAL /HPF
BILIRUB UR QL STRIP: NEGATIVE
CLARITY UR: ABNORMAL
COLOR UR: YELLOW
GLUCOSE UR STRIP-MCNC: NEGATIVE MG/DL
HGB UR QL STRIP.AUTO: NEGATIVE
HYALINE CASTS #/AREA URNS LPF: ABNORMAL /LPF
KETONES UR STRIP-MCNC: NEGATIVE MG/DL
LEUKOCYTE ESTERASE UR QL STRIP: NEGATIVE
NITRITE UR QL STRIP: NEGATIVE
NON-SQ EPI CELLS URNS QL MICRO: ABNORMAL /HPF
PH UR STRIP.AUTO: 6 [PH]
PROT UR STRIP-MCNC: ABNORMAL MG/DL
RBC #/AREA URNS AUTO: ABNORMAL /HPF
SL AMB  POCT GLUCOSE, UA: NEGATIVE
SL AMB LEUKOCYTE ESTERASE,UA: NEGATIVE
SL AMB POCT BILIRUBIN,UA: NEGATIVE
SL AMB POCT BLOOD,UA: ABNORMAL
SL AMB POCT CLARITY,UA: ABNORMAL
SL AMB POCT COLOR,UA: YELLOW
SL AMB POCT KETONES,UA: NEGATIVE
SL AMB POCT NITRITE,UA: NEGATIVE
SL AMB POCT PH,UA: 6
SL AMB POCT SPECIFIC GRAVITY,UA: 1.01
SL AMB POCT URINE PROTEIN: ABNORMAL
SL AMB POCT UROBILINOGEN: NORMAL
SP GR UR STRIP.AUTO: 1.02 (ref 1–1.03)
UROBILINOGEN UR QL STRIP.AUTO: 0.2 E.U./DL
WBC #/AREA URNS AUTO: ABNORMAL /HPF

## 2021-02-04 PROCEDURE — 81002 URINALYSIS NONAUTO W/O SCOPE: CPT | Performed by: NURSE PRACTITIONER

## 2021-02-04 PROCEDURE — 3079F DIAST BP 80-89 MM HG: CPT | Performed by: NURSE PRACTITIONER

## 2021-02-04 PROCEDURE — 3008F BODY MASS INDEX DOCD: CPT | Performed by: NURSE PRACTITIONER

## 2021-02-04 PROCEDURE — 3075F SYST BP GE 130 - 139MM HG: CPT | Performed by: NURSE PRACTITIONER

## 2021-02-04 PROCEDURE — 1036F TOBACCO NON-USER: CPT | Performed by: NURSE PRACTITIONER

## 2021-02-04 PROCEDURE — 81001 URINALYSIS AUTO W/SCOPE: CPT | Performed by: NURSE PRACTITIONER

## 2021-02-04 PROCEDURE — 99213 OFFICE O/P EST LOW 20 MIN: CPT | Performed by: NURSE PRACTITIONER

## 2021-02-04 RX ORDER — NITROFURANTOIN 25; 75 MG/1; MG/1
100 CAPSULE ORAL 2 TIMES DAILY
Qty: 10 CAPSULE | Refills: 0 | Status: SHIPPED | OUTPATIENT
Start: 2021-02-04 | End: 2021-02-09

## 2021-03-17 LAB — RENIN PLAS-CCNC: 1.43 NG/ML/HR (ref 0.17–5.38)

## 2021-03-30 DIAGNOSIS — Z23 ENCOUNTER FOR IMMUNIZATION: ICD-10-CM

## 2021-04-01 ENCOUNTER — IMMUNIZATIONS (OUTPATIENT)
Dept: FAMILY MEDICINE CLINIC | Facility: HOSPITAL | Age: 61
End: 2021-04-01

## 2021-04-01 DIAGNOSIS — Z23 ENCOUNTER FOR IMMUNIZATION: Primary | ICD-10-CM

## 2021-04-01 PROCEDURE — 91301 SARS-COV-2 / COVID-19 MRNA VACCINE (MODERNA) 100 MCG: CPT

## 2021-04-01 PROCEDURE — 0011A SARS-COV-2 / COVID-19 MRNA VACCINE (MODERNA) 100 MCG: CPT

## 2021-04-26 ENCOUNTER — RA CDI HCC (OUTPATIENT)
Dept: OTHER | Facility: HOSPITAL | Age: 61
End: 2021-04-26

## 2021-04-26 NOTE — PROGRESS NOTES
[facility]  coding opportunities          Chart reviewed, no opportunity found: CHART REVIEWED, NO OPPORTUNITY FOUND              Patients insurance company:  [facility] (Medicare Advantage and Commercial)

## 2021-04-28 DIAGNOSIS — F32.A ANXIETY AND DEPRESSION: ICD-10-CM

## 2021-04-28 DIAGNOSIS — F41.9 ANXIETY AND DEPRESSION: ICD-10-CM

## 2021-04-28 RX ORDER — BUSPIRONE HYDROCHLORIDE 10 MG/1
TABLET ORAL
Qty: 180 TABLET | Refills: 1 | Status: SHIPPED | OUTPATIENT
Start: 2021-04-28 | End: 2021-08-02 | Stop reason: SDUPTHER

## 2021-04-29 ENCOUNTER — IMMUNIZATIONS (OUTPATIENT)
Dept: FAMILY MEDICINE CLINIC | Facility: HOSPITAL | Age: 61
End: 2021-04-29

## 2021-04-29 DIAGNOSIS — Z23 ENCOUNTER FOR IMMUNIZATION: Primary | ICD-10-CM

## 2021-04-29 PROCEDURE — 91301 SARS-COV-2 / COVID-19 MRNA VACCINE (MODERNA) 100 MCG: CPT

## 2021-04-29 PROCEDURE — 0012A SARS-COV-2 / COVID-19 MRNA VACCINE (MODERNA) 100 MCG: CPT

## 2021-04-30 ENCOUNTER — OFFICE VISIT (OUTPATIENT)
Dept: FAMILY MEDICINE CLINIC | Facility: CLINIC | Age: 61
End: 2021-04-30
Payer: COMMERCIAL

## 2021-04-30 VITALS
WEIGHT: 170 LBS | BODY MASS INDEX: 31.28 KG/M2 | OXYGEN SATURATION: 99 % | DIASTOLIC BLOOD PRESSURE: 80 MMHG | HEIGHT: 62 IN | TEMPERATURE: 98.5 F | HEART RATE: 81 BPM | SYSTOLIC BLOOD PRESSURE: 112 MMHG

## 2021-04-30 DIAGNOSIS — Z13.1 SCREENING FOR DIABETES MELLITUS: ICD-10-CM

## 2021-04-30 DIAGNOSIS — Z13.220 SCREENING FOR HYPERLIPIDEMIA: ICD-10-CM

## 2021-04-30 DIAGNOSIS — Z13.29 SCREENING FOR THYROID DISORDER: ICD-10-CM

## 2021-04-30 DIAGNOSIS — Z12.4 SCREENING FOR CERVICAL CANCER: ICD-10-CM

## 2021-04-30 DIAGNOSIS — H53.19 HALO, VISUAL: ICD-10-CM

## 2021-04-30 DIAGNOSIS — Z01.419 ENCOUNTER FOR WELL WOMAN EXAM: ICD-10-CM

## 2021-04-30 DIAGNOSIS — Z13.0 SCREENING, ANEMIA, DEFICIENCY, IRON: ICD-10-CM

## 2021-04-30 DIAGNOSIS — E66.09 CLASS 1 OBESITY DUE TO EXCESS CALORIES WITHOUT SERIOUS COMORBIDITY WITH BODY MASS INDEX (BMI) OF 34.0 TO 34.9 IN ADULT: ICD-10-CM

## 2021-04-30 DIAGNOSIS — I10 ESSENTIAL HYPERTENSION: ICD-10-CM

## 2021-04-30 DIAGNOSIS — H53.69 DIMINISHED NIGHT VISION: ICD-10-CM

## 2021-04-30 DIAGNOSIS — Z12.31 ENCOUNTER FOR SCREENING MAMMOGRAM FOR MALIGNANT NEOPLASM OF BREAST: ICD-10-CM

## 2021-04-30 DIAGNOSIS — Z00.00 ANNUAL PHYSICAL EXAM: Primary | ICD-10-CM

## 2021-04-30 PROCEDURE — 99396 PREV VISIT EST AGE 40-64: CPT | Performed by: NURSE PRACTITIONER

## 2021-04-30 NOTE — PATIENT INSTRUCTIONS

## 2021-04-30 NOTE — PROGRESS NOTES
[facility] CARE    NAME: Whitney Tejada  AGE: 64 y o  SEX: female  : 1960     DATE: 2021     Assessment and Plan:     Problem List Items Addressed This Visit     Class 1 obesity due to excess calories without serious comorbidity with body mass index (BMI) of 34 0 to 34 9 in adult    Essential hypertension      Other Visit Diagnoses     Annual physical exam    -  Primary    Encounter for screening mammogram for malignant neoplasm of breast        Relevant Orders    Mammo screening bilateral w 3d & cad    Diminished night vision        Relevant Orders    Ambulatory Referral to Ophthalmology    Halo, visual        Relevant Orders    Ambulatory Referral to Ophthalmology    Screening for hyperlipidemia        Relevant Orders    Lipid Panel with Direct LDL reflex    Screening, anemia, deficiency, iron        Relevant Orders    CBC and differential    Screening for diabetes mellitus        Relevant Orders    Comprehensive metabolic panel    Screening for thyroid disorder        Relevant Orders    TSH, 3rd generation with Free T4 reflex    Screening for cervical cancer        Relevant Orders    Ambulatory referral to Obstetrics / Gynecology    Encounter for well woman exam        Relevant Orders    Ambulatory referral to Obstetrics / Gynecology          Immunizations and preventive care screenings were discussed with patient today  Appropriate education was printed on patient's after visit summary  Counseling:  Alcohol/drug use: discussed moderation in alcohol intake, the recommendations for healthy alcohol use, and avoidance of illicit drug use  Dental Health: discussed importance of regular tooth brushing, flossing, and dental visits  Injury prevention: discussed safety/seat belts, safety helmets, smoke detectors, carbon dioxide detectors, and smoking near bedding or upholstery    Sexual health: discussed sexually transmitted diseases, partner selection, use of condoms, avoidance of unintended pregnancy, and contraceptive alternatives  · Exercise: the importance of regular exercise/physical activity was discussed  Recommend exercise 3-5 times per week for at least 30 minutes  BMI Counseling: Body mass index is 31 6 kg/m²  The BMI is above normal  Nutrition recommendations include decreasing portion sizes, consuming healthier snacks, limiting drinks that contain sugar, moderation in carbohydrate intake and reducing intake of cholesterol  Exercise recommendations include exercising 3-5 times per week  No pharmacotherapy was ordered  Return in 6 months (on 10/30/2021)  Chief Complaint:     Chief Complaint   Patient presents with    Annual Exam      History of Present Illness:     Adult Annual Physical   Patient here for a comprehensive physical exam  The patient reports no problems  Diet and Physical Activity  · Diet/Nutrition: well balanced diet, limited junk food, consuming 3-5 servings of fruits/vegetables daily and limited fruits/vegetables  · Exercise: no formal exercise and does go up and down stairs several times a day with work  Depression Screening  PHQ-9 Depression Screening    PHQ-9:   Frequency of the following problems over the past two weeks:           General Health  · Sleep: sleeps well and gets 4-6 hours of sleep on average  · Hearing: normal - bilateral   · Vision: vision problems: blurry, especially at night  Rx specticles but lost them  , most recent eye exam >1 year ago and wears glasses  · Dental: no dental visits for >1 year, brushes teeth twice daily and flosses teeth occasionally  /GYN Health  · Patient is: postmenopausal  · Last menstrual period: 2009  Review of Systems:     Review of Systems   Constitutional: Negative  HENT: Negative  Eyes: Negative  Respiratory: Negative  Cardiovascular: Negative  Gastrointestinal: Negative      Endocrine: Negative  Genitourinary: Negative  Musculoskeletal: Negative  Skin: Negative  Allergic/Immunologic: Negative  Neurological: Negative  Hematological: Negative  Psychiatric/Behavioral: Negative  Past Medical History:     Past Medical History:   Diagnosis Date    Anxiety     Heart attack [facility])     History of blood transfusion     History of depression     Hypertension       Past Surgical History:     Past Surgical History:   Procedure Laterality Date    BREAST SURGERY Right 1999    calcifications      SECTION  ,     CHOLECYSTECTOMY      GASTRIC BYPASS      OOPHORECTOMY Right       Social History:     E-Cigarette/Vaping    E-Cigarette Use Never User      E-Cigarette/Vaping Substances    Nicotine No     THC No     CBD No     Flavoring No     Other No     Unknown No      Social History     Socioeconomic History    Marital status:       Spouse name: None    Number of children: None    Years of education: None    Highest education level: None   Occupational History     Employer: [employer]   Social Needs    Financial resource strain: None    Food insecurity     Worry: None     Inability: None    Transportation needs     Medical: None     Non-medical: None   Tobacco Use    Smoking status: Never Smoker    Smokeless tobacco: Never Used   Substance and Sexual Activity    Alcohol use: Yes     Frequency: 2-3 times a week    Drug use: Not Currently    Sexual activity: None   Lifestyle    Physical activity     Days per week: None     Minutes per session: None    Stress: None   Relationships    Social connections     Talks on phone: None     Gets together: None     Attends Sabianist service: None     Active member of club or organization: None     Attends meetings of clubs or organizations: None     Relationship status: None    Intimate partner violence     Fear of current or ex partner: None     Emotionally abused: None     Physically abused: None     Forced sexual activity: None   Other Topics Concern    None   Social History Narrative    None      Family History:     Family History   Problem Relation Age of Onset    Diabetes Mother     Heart attack Father     Diabetes Sister     Substance Abuse Brother     Diabetes Brother       Current Medications:     Current Outpatient Medications   Medication Sig Dispense Refill    busPIRone (BUSPAR) 10 mg tablet TAKE 1 TABLET BY MOUTH TWICE A  tablet 1    diphenhydrAMINE (Benadryl Allergy) 25 mg capsule Take 25 mg by mouth every 6 (six) hours as needed for itching      metoprolol succinate (TOPROL-XL) 100 mg 24 hr tablet Take 1 tablet (100 mg total) by mouth daily 90 tablet 1    valsartan-hydrochlorothiazide (DIOVAN-HCT) 320-25 MG per tablet Take 1 tablet by mouth daily 90 tablet 1     No current facility-administered medications for this visit  Allergies: Allergies   Allergen Reactions    Nsaids      Upset stomach    Oxycodone-Acetaminophen Hives     per t-system      Propoxyphene Hives    Shellfish Allergy - Food Allergy       Physical Exam:     /80 (BP Location: Left arm, Patient Position: Sitting, Cuff Size: Large)   Pulse 81   Temp 98 5 °F (36 9 °C) (Tympanic)   Ht 5' 1 5" (1 562 m)   Wt 77 1 kg (170 lb)   SpO2 99%   BMI 31 60 kg/m²     Physical Exam  Vitals signs and nursing note reviewed  Constitutional:       Appearance: Normal appearance  She is well-developed  HENT:      Head: Normocephalic and atraumatic  Right Ear: Tympanic membrane, ear canal and external ear normal       Left Ear: Tympanic membrane, ear canal and external ear normal       Nose: Nose normal       Mouth/Throat:      Mouth: Mucous membranes are moist    Eyes:      General: Lids are normal  Vision grossly intact  Conjunctiva/sclera: Conjunctivae normal       Pupils: Pupils are equal, round, and reactive to light     Neck:      Musculoskeletal: Normal range of motion and neck supple  Cardiovascular:      Rate and Rhythm: Normal rate and regular rhythm  Pulses: Normal pulses  Heart sounds: Normal heart sounds, S1 normal and S2 normal  No friction rub  No gallop  No S3 sounds  Pulmonary:      Effort: Pulmonary effort is normal       Breath sounds: Normal breath sounds  Abdominal:      General: Bowel sounds are normal       Palpations: Abdomen is soft  Tenderness: There is no abdominal tenderness  Genitourinary:     Comments: Deferred  Musculoskeletal: Normal range of motion  Right lower leg: No edema  Left lower leg: No edema  Lymphadenopathy:      Cervical: No cervical adenopathy  Skin:     General: Skin is warm and dry  Capillary Refill: Capillary refill takes less than 2 seconds  Neurological:      General: No focal deficit present  Mental Status: She is alert and oriented to person, place, and time  Motor: Motor function is intact  Gait: Gait is intact  Psychiatric:         Attention and Perception: Attention and perception normal          Mood and Affect: Mood and affect normal          Speech: Speech normal          Behavior: Behavior normal  Behavior is cooperative  Thought Content:  Thought content normal          Cognition and Memory: Cognition and memory normal          Judgment: Judgment normal           LETTY Reynolds  [facility] [place] [facility]

## 2021-05-19 ENCOUNTER — HOSPITAL ENCOUNTER (EMERGENCY)
Facility: HOSPITAL | Age: 61
Discharge: HOME | End: 2021-05-19
Attending: FAMILY MEDICINE | Admitting: FAMILY MEDICINE
Payer: COMMERCIAL

## 2021-05-19 ENCOUNTER — HOSPITAL ENCOUNTER (OUTPATIENT)
Dept: MAMMOGRAPHY | Facility: HOSPITAL | Age: 61
Discharge: HOME | End: 2021-05-19
Payer: COMMERCIAL

## 2021-05-19 ENCOUNTER — APPOINTMENT (EMERGENCY)
Dept: RADIOLOGY | Facility: HOSPITAL | Age: 61
End: 2021-05-19
Payer: COMMERCIAL

## 2021-05-19 VITALS
SYSTOLIC BLOOD PRESSURE: 156 MMHG | HEART RATE: 70 BPM | RESPIRATION RATE: 18 BRPM | OXYGEN SATURATION: 98 % | DIASTOLIC BLOOD PRESSURE: 73 MMHG | TEMPERATURE: 97.4 F

## 2021-05-19 VITALS — HEIGHT: 62 IN | WEIGHT: 170 LBS | BODY MASS INDEX: 31.28 KG/M2

## 2021-05-19 DIAGNOSIS — M54.50 ACUTE LOW BACK PAIN: Primary | ICD-10-CM

## 2021-05-19 DIAGNOSIS — Z12.31 ENCOUNTER FOR SCREENING MAMMOGRAM FOR MALIGNANT NEOPLASM OF BREAST: ICD-10-CM

## 2021-05-19 PROCEDURE — 99283 EMERGENCY DEPT VISIT LOW MDM: CPT

## 2021-05-19 PROCEDURE — 77067 SCR MAMMO BI INCL CAD: CPT

## 2021-05-19 PROCEDURE — 96372 THER/PROPH/DIAG INJ SC/IM: CPT

## 2021-05-19 PROCEDURE — 77063 BREAST TOMOSYNTHESIS BI: CPT

## 2021-05-19 PROCEDURE — 72100 X-RAY EXAM L-S SPINE 2/3 VWS: CPT

## 2021-05-19 PROCEDURE — 99283 EMERGENCY DEPT VISIT LOW MDM: CPT | Performed by: PHYSICIAN ASSISTANT

## 2021-05-19 RX ORDER — KETOROLAC TROMETHAMINE 30 MG/ML
15 INJECTION, SOLUTION INTRAMUSCULAR; INTRAVENOUS ONCE
Status: COMPLETED | OUTPATIENT
Start: 2021-05-19 | End: 2021-05-19

## 2021-05-19 RX ORDER — ACETAMINOPHEN 325 MG/1
975 TABLET ORAL ONCE
Status: COMPLETED | OUTPATIENT
Start: 2021-05-19 | End: 2021-05-19

## 2021-05-19 RX ORDER — CYCLOBENZAPRINE HCL 10 MG
10 TABLET ORAL 2 TIMES DAILY PRN
Qty: 20 TABLET | Refills: 0 | Status: SHIPPED | OUTPATIENT
Start: 2021-05-19 | End: 2021-09-20 | Stop reason: ALTCHOICE

## 2021-05-19 RX ADMIN — KETOROLAC TROMETHAMINE 15 MG: 30 INJECTION, SOLUTION INTRAMUSCULAR; INTRAVENOUS at 12:29

## 2021-05-19 RX ADMIN — ACETAMINOPHEN 975 MG: 325 TABLET ORAL at 12:28

## 2021-05-19 NOTE — ED PROVIDER NOTES
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  Did not take anything for pain pta  28-year-old female history of gastric bypass presents complaining of low back pain  Patient reports that the pain started approximately 1 year ago when a deck broke and she suffered a fall  She reports that the pain then resolved and has since returned approximately 1 week ago  Patient works in healthcare having to lift patients frequently which she reports exacerbates her back pain  She denies any radicular symptoms  Denies bowel or bladder incontinence, saddle anesthesia, lower extremity weakness, history of cancers, IV drug use or recent fevers  She has been taking naproxen with minimal relief of her symptoms  Denies any recent trauma  Denies any other complaints or concerns at this time  Prior to Admission Medications   Prescriptions Last Dose Informant Patient Reported?  Taking?   busPIRone (BUSPAR) 10 mg tablet   No No   Sig: TAKE 1 TABLET BY MOUTH TWICE A DAY   diphenhydrAMINE (Benadryl Allergy) 25 mg capsule  Self Yes No   Sig: Take 25 mg by mouth every 6 (six) hours as needed for itching   metoprolol succinate (TOPROL-XL) 100 mg 24 hr tablet   No No   Sig: Take 1 tablet (100 mg total) by mouth daily   valsartan-hydrochlorothiazide (DIOVAN-HCT) 320-25 MG per tablet   No No   Sig: Take 1 tablet by mouth daily      Facility-Administered Medications: None       Past Medical History:   Diagnosis Date    Anxiety     Heart attack ([facility] [address] ) 2006    History of blood transfusion     History of depression     Hypertension        Past Surgical History:   Procedure Laterality Date    BREAST CYST EXCISION Right   benign    BREAST SURGERY Right     calcifications      SECTION  , 1985    CHOLECYSTECTOMY      GASTRIC BYPASS      OOPHORECTOMY Right        Family History   Problem Relation Age of Onset    Diabetes Mother     Heart attack Father     Diabetes Sister     Substance Abuse Brother     Diabetes Brother     No Known Problems Maternal Grandmother     Breast cancer Paternal Grandmother     No Known Problems Sister     No Known Problems Sister     No Known Problems Maternal Aunt     No Known Problems Maternal Aunt     No Known Problems Maternal Aunt     No Known Problems Paternal Aunt     No Known Problems Paternal Aunt     No Known Problems Paternal Aunt      I have reviewed and agree with the history as documented  E-Cigarette/Vaping    E-Cigarette Use Never User      E-Cigarette/Vaping Substances    Nicotine No     THC No     CBD No     Flavoring No     Other No     Unknown No      Social History     Tobacco Use    Smoking status: Never Smoker    Smokeless tobacco: Never Used   Substance Use Topics    Alcohol use: Yes     Frequency: 2-3 times a week    Drug use: Not Currently       Review of Systems   Constitutional: Negative for chills, fatigue and fever  HENT: Negative for ear pain and sore throat  Eyes: Negative for pain  Respiratory: Negative for cough, shortness of breath and wheezing  Cardiovascular: Negative for chest pain, palpitations and leg swelling  Gastrointestinal: Negative for abdominal pain, constipation, diarrhea, nausea and vomiting  Endocrine: Negative for polyuria  Genitourinary: Negative for dysuria and pelvic pain  Musculoskeletal: Positive for back pain  Negative for arthralgias, myalgias, neck pain and neck stiffness  Skin: Negative for rash  Neurological: Negative for dizziness, syncope, light-headedness and headaches  All other systems reviewed and are negative  Physical Exam  Physical Exam  Constitutional:       Appearance: She is well-developed  HENT:      Head: Normocephalic and atraumatic  Mouth/Throat:      Pharynx: No oropharyngeal exudate  Neck:      Musculoskeletal: Normal range of motion  Abdominal:      General: Bowel sounds are normal       Palpations: Abdomen is soft  Tenderness: There is no abdominal tenderness  Musculoskeletal: Normal range of motion  Comments: 5/5 strength bilateral lower extremities  Sensory motor function is intact  Normal reflexes  Negative straight raise test    Skin:     General: Skin is warm  Capillary Refill: Capillary refill takes less than 2 seconds  Neurological:      General: No focal deficit present  Mental Status: She is alert and oriented to person, place, and time  Vital Signs  ED Triage Vitals [05/19/21 1140]   Temperature Pulse Respirations Blood Pressure SpO2   (!) 97 4 °F (36 3 °C) 70 18 156/73 98 %      Temp Source Heart Rate Source Patient Position - Orthostatic VS BP Location FiO2 (%)   Tympanic Monitor Sitting Left arm --      Pain Score       7           Vitals:    05/19/21 1140   BP: 156/73   Pulse: 70   Patient Position - Orthostatic VS: Sitting         Visual Acuity      ED Medications  Medications   ketorolac (TORADOL) injection 15 mg (15 mg Intramuscular Given 5/19/21 1229)   acetaminophen (TYLENOL) tablet 975 mg (975 mg Oral Given 5/19/21 1228)       Diagnostic Studies  Results Reviewed     None                 XR spine lumbar 2 or 3 views injury   ED Interpretation by Jony Altamirano PA-C (05/19 1257)   No obvious acute osseous abnormality                 Procedures  Procedures         ED Course                                           MDM  Number of Diagnoses or Management Options  Acute low back pain:   Diagnosis management comments: I estimate there is LOW risk for AAA, Cauda Equina, Epidural Mass Lesion, Spinal Stenosis, or herniated disk causing severe stenosis, thus I consider the discharge disposition reasonable   We have discussed the diagnosis and risks, and we agree with discharging home to follow-up with their primary doctor and following up with [facility]  We also discussed returning to the Emergency Department immediately if new or worsening symptoms occur  We have discussed the symptoms which are most concerning (e g , saddle anesthesia, urinary or bowel incontinence or retention, fevers, changing or worsening pain) that necessitate immediate return  Disposition  Final diagnoses:   Acute low back pain     Time reflects when diagnosis was documented in both MDM as applicable and the Disposition within this note     Time User Action Codes Description Comment    5/19/2021  1:00 PM Fredi César Add [M54 5] Acute low back pain       ED Disposition     ED Disposition Condition Date/Time Comment    Discharge Stable Wed May 19, 2021  1:00 PM Charmayne Grosser discharge to home/self care              Follow-up Information    None         Patient's Medications   Discharge Prescriptions    CYCLOBENZAPRINE (FLEXERIL) 10 MG TABLET    Take 1 tablet (10 mg total) by mouth 2 (two) times a day as needed for muscle spasms       Start Date: 5/19/2021 End Date: --       Order Dose: 10 mg       Quantity: 20 tablet    Refills: 0    DICLOFENAC SODIUM (VOLTAREN) 1 %    Apply 2 g topically 4 (four) times a day       Start Date: 5/19/2021 End Date: --       Order Dose: 2 g       Quantity: 150 g    Refills: 0         PDMP Review     None          ED Provider  Electronically Signed by           Kevin Yu PA-C  05/19/21 3855

## 2021-05-19 NOTE — DISCHARGE INSTRUCTIONS
Apply Voltaren gel as were directed  Take a 1000 mg of Tylenol every 8 hours while your pain last  Follow-up with [facility]  We will call you if the radiologist notices any significant findings on your x-rays  Return to the ER with any loss of bowel or bladder it too weak function, lower extremity weakness or fevers

## 2021-05-20 ENCOUNTER — TELEPHONE (OUTPATIENT)
Dept: PHYSICAL THERAPY | Facility: OTHER | Age: 61
End: 2021-05-20

## 2021-05-20 NOTE — TELEPHONE ENCOUNTER
Nurse reached out to discuss recent referral entered for  [facility] program and offerings  Nurse identified self and reviewed triage/eval process with her  Patient asked if she could CB as she is on a conference call  Patient is interested in participating  Nurse agreed and confirmed pt has CS contact  Referral deferred 1 day under circumstances- will await CB from pt

## 2021-05-24 ENCOUNTER — TELEPHONE (OUTPATIENT)
Dept: PHYSICAL THERAPY | Facility: OTHER | Age: 61
End: 2021-05-24

## 2021-05-24 NOTE — TELEPHONE ENCOUNTER
Voice mail/message left requesting patient to return call to [facility] program including our hours of business and phone number  Kindly asked to LM with Full Name,  and Reminded CB may come from a non- number as the nurses are working remotely/off-site      Referral deferred for f/u attempt per protocol

## 2021-06-03 ENCOUNTER — NURSE TRIAGE (OUTPATIENT)
Dept: PHYSICAL THERAPY | Facility: OTHER | Age: 61
End: 2021-06-03

## 2021-06-03 DIAGNOSIS — M54.50 ACUTE BILATERAL LOW BACK PAIN, UNSPECIFIED WHETHER SCIATICA PRESENT: Primary | ICD-10-CM

## 2021-06-03 NOTE — TELEPHONE ENCOUNTER
Additional Information   Negative: Is this related to a work injury?  Negative: Is this related to an MVA?  Negative: Are you currently recieving homecare services?  Negative: Does the patient have a fever?  Negative: Has the patient had unexplained weight loss?  Negative: Is the patient experiencing urine retention?  Negative: Is the patient experiencing blood in sputum?  Negative: Has the patient experienced major trauma? (fall from height, high speed collision, direct blow to spine) and is also experiencing nausea, light-headedness, or loss of consciousness?  Negative: Is the patient experiencing acute drop foot or paralysis?  Negative: Is this a chronic condition? Background - Initial Assessment  Clinical complaint: bilateral low back pain that radiates around to the front abd area  Date of onset: history of fall 1 yr ago- back pain resolved- acute episode started about 1 mth ago- warranted ED visit  Frequency of pain: constant  Quality of pain: dull ache and shooting    Protocols used: [facility] COMPREHENSIVE SPINE PROGRAM PROTOCOL    Patient seen in ED for acute low back pain on 5/19/21= PLEASE SEE NOTES  Triaged for above complaints and NO RF s/s present  Referral entered for [facility]  Patient will look up contact info (pt in store now)  Patient appreciative of call  Nurse wished her well and referral closed

## 2021-08-02 DIAGNOSIS — F32.A ANXIETY AND DEPRESSION: ICD-10-CM

## 2021-08-02 DIAGNOSIS — F41.9 ANXIETY AND DEPRESSION: ICD-10-CM

## 2021-08-02 DIAGNOSIS — I10 RESISTANT HYPERTENSION: ICD-10-CM

## 2021-08-02 DIAGNOSIS — I10 ESSENTIAL HYPERTENSION: ICD-10-CM

## 2021-08-02 RX ORDER — BUSPIRONE HYDROCHLORIDE 10 MG/1
10 TABLET ORAL 2 TIMES DAILY
Qty: 180 TABLET | Refills: 1 | Status: SHIPPED | OUTPATIENT
Start: 2021-08-02 | End: 2021-08-10 | Stop reason: SDUPTHER

## 2021-08-02 RX ORDER — METOPROLOL SUCCINATE 100 MG/1
100 TABLET, EXTENDED RELEASE ORAL DAILY
Qty: 90 TABLET | Refills: 1 | Status: SHIPPED | OUTPATIENT
Start: 2021-08-02 | End: 2021-08-10 | Stop reason: SDUPTHER

## 2021-08-02 RX ORDER — VALSARTAN AND HYDROCHLOROTHIAZIDE 320; 25 MG/1; MG/1
1 TABLET, FILM COATED ORAL DAILY
Qty: 90 TABLET | Refills: 1 | Status: SHIPPED | OUTPATIENT
Start: 2021-08-02 | End: 2021-08-10 | Stop reason: SDUPTHER

## 2021-08-10 DIAGNOSIS — I10 ESSENTIAL HYPERTENSION: ICD-10-CM

## 2021-08-10 DIAGNOSIS — F41.9 ANXIETY AND DEPRESSION: ICD-10-CM

## 2021-08-10 DIAGNOSIS — F32.A ANXIETY AND DEPRESSION: ICD-10-CM

## 2021-08-10 DIAGNOSIS — I10 RESISTANT HYPERTENSION: ICD-10-CM

## 2021-08-10 RX ORDER — BUSPIRONE HYDROCHLORIDE 10 MG/1
10 TABLET ORAL 2 TIMES DAILY
Qty: 180 TABLET | Refills: 1 | Status: SHIPPED | OUTPATIENT
Start: 2021-08-10 | End: 2021-09-20 | Stop reason: SDUPTHER

## 2021-08-10 RX ORDER — METOPROLOL SUCCINATE 100 MG/1
100 TABLET, EXTENDED RELEASE ORAL DAILY
Qty: 90 TABLET | Refills: 1 | Status: SHIPPED | OUTPATIENT
Start: 2021-08-10 | End: 2021-09-20 | Stop reason: SDUPTHER

## 2021-08-10 RX ORDER — VALSARTAN AND HYDROCHLOROTHIAZIDE 320; 25 MG/1; MG/1
1 TABLET, FILM COATED ORAL DAILY
Qty: 90 TABLET | Refills: 1 | Status: SHIPPED | OUTPATIENT
Start: 2021-08-10 | End: 2021-09-20 | Stop reason: SDUPTHER

## 2021-08-10 NOTE — TELEPHONE ENCOUNTER
Patient reports her medication were not received by the pharmacy  Can we please resend to [facility]? Epic shows rx were received 8/2/21  Patient called pharmacy and no rx were received

## 2021-09-08 DIAGNOSIS — Z01.419 WOMEN'S ANNUAL ROUTINE GYNECOLOGICAL EXAMINATION: Primary | ICD-10-CM

## 2021-09-20 ENCOUNTER — OFFICE VISIT (OUTPATIENT)
Dept: FAMILY MEDICINE CLINIC | Facility: CLINIC | Age: 61
End: 2021-09-20

## 2021-09-20 VITALS
BODY MASS INDEX: 32.5 KG/M2 | SYSTOLIC BLOOD PRESSURE: 138 MMHG | WEIGHT: 176.6 LBS | HEART RATE: 70 BPM | OXYGEN SATURATION: 100 % | DIASTOLIC BLOOD PRESSURE: 82 MMHG | HEIGHT: 62 IN | TEMPERATURE: 97.9 F

## 2021-09-20 DIAGNOSIS — N94.10 DYSPAREUNIA IN FEMALE: Primary | ICD-10-CM

## 2021-09-20 DIAGNOSIS — E66.09 CLASS 1 OBESITY DUE TO EXCESS CALORIES WITHOUT SERIOUS COMORBIDITY WITH BODY MASS INDEX (BMI) OF 34.0 TO 34.9 IN ADULT: ICD-10-CM

## 2021-09-20 DIAGNOSIS — K62.5 BRBPR (BRIGHT RED BLOOD PER RECTUM): ICD-10-CM

## 2021-09-20 DIAGNOSIS — I10 ESSENTIAL HYPERTENSION: ICD-10-CM

## 2021-09-20 DIAGNOSIS — F32.A ANXIETY AND DEPRESSION: ICD-10-CM

## 2021-09-20 DIAGNOSIS — Z13.31 DEPRESSION SCREENING NEGATIVE: ICD-10-CM

## 2021-09-20 DIAGNOSIS — R31.9 HEMATURIA, UNSPECIFIED TYPE: ICD-10-CM

## 2021-09-20 DIAGNOSIS — F41.9 ANXIETY AND DEPRESSION: ICD-10-CM

## 2021-09-20 LAB
SL AMB  POCT GLUCOSE, UA: NORMAL
SL AMB LEUKOCYTE ESTERASE,UA: NORMAL
SL AMB POCT BILIRUBIN,UA: NORMAL
SL AMB POCT BLOOD,UA: NORMAL
SL AMB POCT CLARITY,UA: NORMAL
SL AMB POCT COLOR,UA: YELLOW
SL AMB POCT KETONES,UA: NORMAL
SL AMB POCT NITRITE,UA: NORMAL
SL AMB POCT PH,UA: 5
SL AMB POCT SPECIFIC GRAVITY,UA: 1
SL AMB POCT URINE PROTEIN: NORMAL
SL AMB POCT UROBILINOGEN: NORMAL

## 2021-09-20 PROCEDURE — 99214 OFFICE O/P EST MOD 30 MIN: CPT | Performed by: NURSE PRACTITIONER

## 2021-09-20 PROCEDURE — 81002 URINALYSIS NONAUTO W/O SCOPE: CPT | Performed by: NURSE PRACTITIONER

## 2021-09-20 RX ORDER — METOPROLOL SUCCINATE 100 MG/1
100 TABLET, EXTENDED RELEASE ORAL DAILY
Qty: 90 TABLET | Refills: 1 | Status: SHIPPED | OUTPATIENT
Start: 2021-09-20 | End: 2021-11-19 | Stop reason: SDUPTHER

## 2021-09-20 RX ORDER — VALSARTAN AND HYDROCHLOROTHIAZIDE 320; 25 MG/1; MG/1
1 TABLET, FILM COATED ORAL DAILY
Qty: 90 TABLET | Refills: 1 | Status: SHIPPED | OUTPATIENT
Start: 2021-09-20 | End: 2021-11-19 | Stop reason: SDUPTHER

## 2021-09-20 RX ORDER — BUSPIRONE HYDROCHLORIDE 10 MG/1
10 TABLET ORAL 2 TIMES DAILY
Qty: 180 TABLET | Refills: 1 | Status: SHIPPED | OUTPATIENT
Start: 2021-09-20 | End: 2021-11-19 | Stop reason: SDUPTHER

## 2021-09-20 NOTE — PROGRESS NOTES
Assessment/Plan:    Problem List Items Addressed This Visit     Class 1 obesity due to excess calories without serious comorbidity with body mass index (BMI) of 34 0 to 34 9 in adult    Essential hypertension    Relevant Medications    metoprolol succinate (TOPROL-XL) 100 mg 24 hr tablet    valsartan-hydrochlorothiazide (DIOVAN-HCT) 320-25 MG per tablet    Anxiety and depression    Relevant Medications    busPIRone (BUSPAR) 10 mg tablet      Other Visit Diagnoses     Dyspareunia in female    -  Primary    has GYN Appt 9/29/2021    BRBPR (bright red blood per rectum)        Hematuria, unspecified type        Relevant Orders    POCT urine dip (Completed)    UA w Reflex to Microscopic w Reflex to Culture - Clinic Collect    Depression screening negative               Diagnoses and all orders for this visit:    Dyspareunia in female  Comments:  has GYN Appt 9/29/2021    BRBPR (bright red blood per rectum)    Essential hypertension  -     metoprolol succinate (TOPROL-XL) 100 mg 24 hr tablet; Take 1 tablet (100 mg total) by mouth daily  -     valsartan-hydrochlorothiazide (DIOVAN-HCT) 320-25 MG per tablet; Take 1 tablet by mouth daily    Hematuria, unspecified type  -     POCT urine dip  -     UA w Reflex to Microscopic w Reflex to Culture - Clinic Collect    Anxiety and depression  -     busPIRone (BUSPAR) 10 mg tablet; Take 1 tablet (10 mg total) by mouth 2 (two) times a day    Class 1 obesity due to excess calories without serious comorbidity with body mass index (BMI) of 34 0 to 34 9 in adult    Depression screening negative        No problem-specific Assessment & Plan notes found for this encounter  Subjective:      Patient ID: Felipe Valderrama is a 63 y o  female  Felipe Valderrama c/o dyspareunia and hematuria, she will be seen at [facility] 9/29  She reports intermittent pink on toilety tissue after voiding  Hypertension  This is a chronic problem  The problem is controlled   Pertinent negatives include no anxiety, blurred vision, chest pain, headaches, neck pain, orthopnea, palpitations, peripheral edema, PND or shortness of breath  There are no associated agents to hypertension  Risk factors for coronary artery disease include obesity and post-menopausal state  Past treatments include beta blockers, angiotensin blockers and diuretics  The current treatment provides significant improvement  There are no compliance problems  The following portions of the patient's history were reviewed and updated as appropriate:   She has a past medical history of Anxiety, Heart attack ([facility] [address] ) (), History of blood transfusion (), History of depression, and Hypertension  ,  does not have any pertinent problems on file  ,   has a past surgical history that includes Gastric bypass; Oophorectomy (Right); Cholecystectomy; Breast surgery (Right, );  section (, ); and Breast cyst excision (Right,   benign)  ,  family history includes Breast cancer in her paternal grandmother; Diabetes in her brother, mother, and sister; Heart attack in her father; No Known Problems in her maternal aunt, maternal aunt, maternal aunt, maternal grandmother, paternal aunt, paternal aunt, paternal aunt, sister, and sister; Substance Abuse in her brother  ,   reports that she has never smoked  She has never used smokeless tobacco  She reports current alcohol use  She reports previous drug use ,  is allergic to nsaids, oxycodone-acetaminophen, propoxyphene, and shellfish allergy - food allergy     Current Outpatient Medications   Medication Sig Dispense Refill    busPIRone (BUSPAR) 10 mg tablet Take 1 tablet (10 mg total) by mouth 2 (two) times a day 180 tablet 1    metoprolol succinate (TOPROL-XL) 100 mg 24 hr tablet Take 1 tablet (100 mg total) by mouth daily 90 tablet 1    valsartan-hydrochlorothiazide (DIOVAN-HCT) 320-25 MG per tablet Take 1 tablet by mouth daily 90 tablet 1    diphenhydrAMINE (Benadryl Allergy) 25 mg capsule Take 25 mg by mouth every 6 (six) hours as needed for itching       No current facility-administered medications for this visit  BMI Counseling: Body mass index is 32 83 kg/m²  The BMI is above normal  Nutrition recommendations include decreasing portion sizes, consuming healthier snacks, limiting drinks that contain sugar, moderation in carbohydrate intake and reducing intake of cholesterol  Exercise recommendations include exercising 3-5 times per week  No pharmacotherapy was ordered  Rationale for BMI follow-up plan is due to patient being overweight or obese  Depression Screening and Follow-up Plan:   Clincally patient does not have depression  No treatment is required  Review of Systems   Eyes: Negative for blurred vision  Respiratory: Negative for shortness of breath  Cardiovascular: Negative for chest pain, palpitations, orthopnea and PND  Genitourinary: Positive for dyspareunia, hematuria and vaginal bleeding  Negative for decreased urine volume, difficulty urinating, dysuria, flank pain, frequency, pelvic pain and vaginal discharge  Musculoskeletal: Negative for neck pain  Neurological: Negative for headaches  All other systems reviewed and are negative  Objective:  Vitals:    09/20/21 0749   BP: 138/82   BP Location: Left arm   Patient Position: Sitting   Cuff Size: Standard   Pulse: 70   Temp: 97 9 °F (36 6 °C)   TempSrc: Tympanic   SpO2: 100%   Weight: 80 1 kg (176 lb 9 6 oz)   Height: 5' 1 5" (1 562 m)     Body mass index is 32 83 kg/m²  Physical Exam  Vitals and nursing note reviewed  Constitutional:       Appearance: Normal appearance  She is well-developed  HENT:      Head: Normocephalic and atraumatic        Right Ear: Tympanic membrane, ear canal and external ear normal       Left Ear: Tympanic membrane, ear canal and external ear normal       Nose: Nose normal       Mouth/Throat:      Mouth: Mucous membranes are moist       Pharynx: Uvula midline  Eyes:      General: Lids are normal       Conjunctiva/sclera: Conjunctivae normal       Pupils: Pupils are equal, round, and reactive to light  Neck:      Thyroid: No thyroid mass or thyromegaly  Vascular: No JVD  Trachea: Trachea and phonation normal    Cardiovascular:      Rate and Rhythm: Normal rate and regular rhythm  Pulses: Normal pulses  Heart sounds: Normal heart sounds, S1 normal and S2 normal  No murmur heard  No friction rub  No gallop  Pulmonary:      Effort: Pulmonary effort is normal       Breath sounds: Normal breath sounds  Abdominal:      General: Bowel sounds are normal       Palpations: Abdomen is soft  Tenderness: There is no abdominal tenderness  Genitourinary:     Comments: Deferred   Musculoskeletal:         General: Normal range of motion  Cervical back: Full passive range of motion without pain, normal range of motion and neck supple  Right lower leg: No edema  Left lower leg: No edema  Lymphadenopathy:      Head:      Right side of head: No submental, submandibular, tonsillar, preauricular, posterior auricular or occipital adenopathy  Left side of head: No submental, submandibular, tonsillar, preauricular, posterior auricular or occipital adenopathy  Cervical: No cervical adenopathy  Skin:     General: Skin is warm and dry  Capillary Refill: Capillary refill takes less than 2 seconds  Neurological:      General: No focal deficit present  Mental Status: She is alert and oriented to person, place, and time  Cranial Nerves: Cranial nerves are intact  No cranial nerve deficit  Sensory: Sensation is intact  Motor: Motor function is intact  Coordination: Coordination is intact  Gait: Gait is intact  Deep Tendon Reflexes: Reflexes are normal and symmetric     Psychiatric:         Attention and Perception: Attention and perception normal          Mood and Affect: Mood and affect normal          Speech: Speech normal          Behavior: Behavior normal  Behavior is cooperative  Thought Content:  Thought content normal          Cognition and Memory: Cognition normal          Judgment: Judgment normal

## 2021-09-29 ENCOUNTER — OFFICE VISIT (OUTPATIENT)
Dept: OBGYN CLINIC | Facility: CLINIC | Age: 61
End: 2021-09-29

## 2021-09-29 VITALS
HEIGHT: 61 IN | DIASTOLIC BLOOD PRESSURE: 72 MMHG | SYSTOLIC BLOOD PRESSURE: 132 MMHG | BODY MASS INDEX: 32.85 KG/M2 | WEIGHT: 174 LBS

## 2021-09-29 DIAGNOSIS — Z91.89 AT RISK FOR LOSS OF BONE DENSITY: ICD-10-CM

## 2021-09-29 DIAGNOSIS — N95.0 POSTMENOPAUSAL BLEEDING: ICD-10-CM

## 2021-09-29 DIAGNOSIS — N95.2 VAGINAL ATROPHY: ICD-10-CM

## 2021-09-29 DIAGNOSIS — I25.2 HISTORY OF HEART ATTACK: ICD-10-CM

## 2021-09-29 DIAGNOSIS — Z12.4 PAP SMEAR FOR CERVICAL CANCER SCREENING: ICD-10-CM

## 2021-09-29 DIAGNOSIS — N94.10 DYSPAREUNIA, FEMALE: Primary | ICD-10-CM

## 2021-09-29 LAB
BACTERIA UR QL AUTO: ABNORMAL /HPF
BILIRUB UR QL STRIP: NEGATIVE
CLARITY UR: ABNORMAL
COLOR UR: YELLOW
GLUCOSE UR STRIP-MCNC: NEGATIVE MG/DL
HGB UR QL STRIP.AUTO: NEGATIVE
HYALINE CASTS #/AREA URNS LPF: ABNORMAL /LPF
KETONES UR STRIP-MCNC: NEGATIVE MG/DL
LEUKOCYTE ESTERASE UR QL STRIP: ABNORMAL
NITRITE UR QL STRIP: NEGATIVE
NON-SQ EPI CELLS URNS QL MICRO: ABNORMAL /HPF
PH UR STRIP.AUTO: 6 [PH]
PROT UR STRIP-MCNC: NEGATIVE MG/DL
RBC #/AREA URNS AUTO: ABNORMAL /HPF
SP GR UR STRIP.AUTO: 1.01 (ref 1–1.03)
UROBILINOGEN UR QL STRIP.AUTO: 0.2 E.U./DL
WBC #/AREA URNS AUTO: ABNORMAL /HPF

## 2021-09-29 PROCEDURE — G0476 HPV COMBO ASSAY CA SCREEN: HCPCS | Performed by: OBSTETRICS & GYNECOLOGY

## 2021-09-29 PROCEDURE — G0145 SCR C/V CYTO,THINLAYER,RESCR: HCPCS | Performed by: OBSTETRICS & GYNECOLOGY

## 2021-09-29 PROCEDURE — 87086 URINE CULTURE/COLONY COUNT: CPT | Performed by: OBSTETRICS & GYNECOLOGY

## 2021-09-29 PROCEDURE — 99202 OFFICE O/P NEW SF 15 MIN: CPT | Performed by: OBSTETRICS & GYNECOLOGY

## 2021-09-29 PROCEDURE — 81001 URINALYSIS AUTO W/SCOPE: CPT | Performed by: OBSTETRICS & GYNECOLOGY

## 2021-09-29 RX ORDER — CONJUGATED ESTROGENS 0.62 MG/G
1 CREAM VAGINAL 3 TIMES WEEKLY
Qty: 30 G | Refills: 3 | Status: SHIPPED | OUTPATIENT
Start: 2021-09-29 | End: 2021-10-01 | Stop reason: CLARIF

## 2021-09-29 NOTE — PATIENT INSTRUCTIONS
Menopause   WHAT YOU NEED TO KNOW:   What is menopause? Menopause is a normal stage in a woman's life when her monthly periods stop  A woman who has not had a period for a full year after the age of 36 is considered to be in menopause  Menopause usually occurs between ages 52 to 48  Perimenopause is a stage before menopause that may cause signs and symptoms similar to menopause  Perimenopause may start about 4 years before menopause  What causes menopause? Menopause starts when the ovaries stop making the female hormones estrogen and progesterone  After menopause, a woman is no longer able to become pregnant  Any of the following may trigger menopause or early menopause:  · Older age    · Surgery, including a hysterectomy or oophorectomy    · Family history of early menopause    · Smoking    · Chemotherapy or pelvic radiation    · Chromosome abnormalities, including Del Cid syndrome and Fragile X syndrome    What are the signs and symptoms of menopause? The signs and symptoms of menopause can be different from woman to woman:  · Irregular menstrual cycles with heavy vaginal bleeding followed by decreased bleeding until it stops    · Hot flashes (feeling warm, flushed, and sweaty)     · Vaginal changes such as increased dryness     · Mood changes such as anxiety, depression, or decreased desire to have sex    · Trouble sleeping, joint pain, headaches    · Brittle nails, hair on chin or chest where it is normally absent    · Decrease in breast size and change in skin texture    What do I need to know about menopause? · You can still get pregnant while you have periods  Continue to use birth control if you do not want to get pregnant  You may need to use birth control until it has been 1 year since your periods stopped  · Hormone replacement therapy (HRT) can be used to treat symptoms of menopause  HRT is medicine that replaces your low hormone levels  HRT contains estrogen and sometimes progestin   HRT has benefits and risks  HRT decreases your risk for bone fractures by helping to prevent osteoporosis  HRT also protects you from colon cancer  HRT may increase your risk for breast cancer, blood clots, heart disease, and stroke  Ask your healthcare provider if HRT is right for you  How can I care for myself? · Manage hot flashes  Hot flashes are brief periods of feeling very warm, flushed, and sweaty  Hot flashes can last from a few seconds to several minutes  They may happen many times during the day, and are common at night  Layer your clothing so that you can easily remove some clothing and cool yourself during a hot flash  Cold drinks may also be helpful  · Reduce vaginal dryness  by using over-the-counter vaginal creams  Vaginal dryness may cause you to have pain or discomfort during sex  Only use creams that are made for vaginal use  Do  not  use petroleum jelly  You may need an estrogen cream to put in and around your vagina  Estrogen cream may help decrease vaginal dryness and lower your risk of vaginal infections  · Continue to use birth control  during perimenopause if you do not want to get pregnant  You may need to use birth control until it has been 1 year since your periods stopped  Ask your healthcare provider when you can stop using birth control to prevent pregnancy  How can I live a healthy lifestyle during and after menopause? After menopause, your risk for heart disease and bone loss increases  Ask about these and other ways to stay healthy:  · Exercise regularly  Exercise helps you maintain a healthy weight  Exercise can also help to control your blood pressure and cholesterol levels  Include weight-bearing exercise for strong bones  Weight bearing exercise is recommended for at least 30 minutes, 3 times a week  Ask your healthcare provider about the best exercise plan for you  · Eat a variety of healthy foods    Include fruits, vegetables, whole grains (whole-wheat bread, pasta, and cereals), low-fat dairy, and lean protein foods (beans, poultry, and fish)  Limit foods high in sodium (salt)  Ask your healthcare provider for more information about a meal plan that is right for you  · Do not smoke  If you smoke, it is never too late to quit  You are more likely to have a heart attack, lung disease, blood clots, and cancer if you smoke  Ask your healthcare provider for information if you need help quitting  · Take supplements as directed  You may need extra calcium and vitamin D to help prevent osteoporosis  · Limit alcohol and caffeine  Alcohol and caffeine may worsen your symptoms  When should I contact my healthcare provider? · You have vaginal bleeding after menopause  · You have questions or concerns about your condition or care  CARE AGREEMENT:   You have the right to help plan your care  Learn about your health condition and how it may be treated  Discuss treatment options with your healthcare providers to decide what care you want to receive  You always have the right to refuse treatment  The above information is an  only  It is not intended as medical advice for individual conditions or treatments  Talk to your doctor, nurse or pharmacist before following any medical regimen to see if it is safe and effective for you  © Copyright [employer] 2021 Information is for End User's use only and may not be sold, redistributed or otherwise used for commercial purposes  All illustrations and images included in CareNotes® are the copyrighted property of [employer]  or [employer]        Postmenopausal Bleeding   AMBULATORY CARE:   Postmenopausal bleeding  is bleeding that occurs after menopause  A woman who has not had a period for a full year after the age of 36 is considered to be in menopause  Postmenopausal bleeding may range from spotting to very heavy bleeding    Causes of postmenopausal bleeding:   · Thinning of the endometrium (lining of the uterus) called endometrial atrophy    · Polyps (noncancerous growths) that develop on the inner wall of your uterus or cervix    · Hormone replacement therapy    · Abnormal thickening of the endometrium called endometrial hyperplasia    · Tamoxifen (medicine used to treat breast cancer)    · Cervical, endometrial, or uterine cancer    Call your doctor or gynecologist if:   · You continue to have vaginal bleeding, even with treatment  · You have pain in your abdomen or pelvis  · You have questions or concerns about your condition or care  Treatment for postmenopausal bleeding  depends on the cause of your postmenopausal bleeding  If you have polyps, you may need surgery to remove them  Endometrial atrophy can be treated with medicines  Endometrial hyperplasia may be treated with progestin hormone therapy  Surgery to remove your uterus will be needed if you have endometrial or uterine cancer  Your fallopian tubes, ovaries, and nearby lymph nodes may also be removed  Follow up with your doctor or gynecologist as directed: You may need to return for more tests  Write down your questions so you remember to ask them during your visits  © [employer] 2021 Information is for End User's use only and may not be sold, redistributed or otherwise used for commercial purposes  All illustrations and images included in CareNotes® are the copyrighted property of [employer] M [employer] Inc  or [facility] Maribel Akins   The above information is an  only  It is not intended as medical advice for individual conditions or treatments  Talk to your doctor, nurse or pharmacist before following any medical regimen to see if it is safe and effective for you

## 2021-09-29 NOTE — ASSESSMENT & PLAN NOTE
No vaginal bleeding noted on spec exam    Difficulty visualizing cervix   PAP obtained     TVUS ordered   EMB will be needed, schedule with MD after US resulted

## 2021-09-29 NOTE — PROGRESS NOTES
Assessment/Plan:    Postmenopausal bleeding  No vaginal bleeding noted on spec exam    Difficulty visualizing cervix   PAP obtained     TVUS ordered   EMB will be needed, schedule with MD after US resulted     Vaginal atrophy  Post menopausal vaginal atrophy noted     Premarin ordered     At risk for loss of bone density   for 12 years   Dxa scan ordered     Pap smear for cervical cancer screening  Pap collected today   Pt reports its been years since last Pap    History of heart attack  Hx of MI at age 41     Referral to cardiology        Diagnoses and all orders for this visit:    Dyspareunia, female    Pap smear for cervical cancer screening  -     Ambulatory referral to Gynecology  -     Liquid-based pap, screening    Postmenopausal bleeding  -     US pelvis complete w transvaginal; Future  -     Urine culture  -     Urinalysis with microscopic    Vaginal atrophy  -     conjugated estrogens (Premarin) vaginal cream; Insert 1 g into the vagina 3 (three) times a week    At risk for loss of bone density  -     DXA bone density spine hip and pelvis; Future    History of heart attack  -     Ambulatory referral to Cardiology; Future          Subjective:      Patient ID: Luz Marina Ruiz is a 63 y o  female  62 yo AA presents for pain with intercourse  Reports menopausal status times 12 years  Patient also reports some intermittent pink spotting on her toilet paper when wiping over the past 2 weeks  No unusual vaginal discharge or odor  Denies fevers, chills, other signs of infection , reports  Mild intermittent lower pelvic pain  Patient is in monogamous relationship with no concerns for STDs patient reports not having a Pap smear for unknown years  Patient has a history right breast lump with biopsy  Gastric bypass, right oophorectomy, acute MI at the age of 41          The following portions of the patient's history were reviewed and updated as appropriate: allergies, current medications, past family history, past medical history, past social history, past surgical history and problem list     Review of Systems   Constitutional: Negative for chills, fatigue and fever  Eyes: Negative for visual disturbance  Respiratory: Negative for cough and shortness of breath  Cardiovascular: Negative for chest pain  Gastrointestinal: Negative for abdominal pain  Genitourinary: Positive for vaginal bleeding (Small amount of pink discharge when wiping over past 2 weeks)  Negative for vaginal discharge  Objective:      /72 (BP Location: Left arm, Patient Position: Sitting, Cuff Size: Large)   Ht 5' 1" (1 549 m)   Wt 78 9 kg (174 lb)   BMI 32 88 kg/m²          Physical Exam  Vitals and nursing note reviewed  Constitutional:       Appearance: Normal appearance  She is normal weight  HENT:      Head: Normocephalic and atraumatic  Eyes:      Conjunctiva/sclera: Conjunctivae normal    Cardiovascular:      Rate and Rhythm: Normal rate  Pulmonary:      Effort: Pulmonary effort is normal    Genitourinary:     General: Normal vulva  Exam position: Lithotomy position  Labia:         Right: No rash, tenderness, lesion or injury  Left: No rash, tenderness, lesion or injury  Vagina: Normal       Uterus: Normal        Adnexa: Left adnexa normal       Rectum: Normal       Comments: Normal postmenopausal changes noted  Difficulty visualizing cervix  Right adnexa surgically absent  Musculoskeletal:         General: Normal range of motion  Cervical back: Normal range of motion  Lymphadenopathy:      Lower Body: No right inguinal adenopathy  No left inguinal adenopathy  Skin:     General: Skin is warm and dry  Neurological:      Mental Status: She is alert  Psychiatric:         Mood and Affect: Mood normal          Behavior: Behavior normal          Thought Content:  Thought content normal          Judgment: Judgment normal        when reviewing her chart I noticed that she has active blood work ordered by her primary encouraged her to have this blood work completed as soon as possible    Follow-up for EMB after vaginal ultrasound is completed  Follow-up in regards to effectiveness of vaginal estrogen replacement    Encouraged weight-bearing exercises, proper nutrition, calcium and vitamin-D intake

## 2021-10-01 ENCOUNTER — TELEPHONE (OUTPATIENT)
Dept: OBGYN CLINIC | Facility: CLINIC | Age: 61
End: 2021-10-01

## 2021-10-01 DIAGNOSIS — N95.2 VAGINAL ATROPHY: Primary | ICD-10-CM

## 2021-10-01 LAB
BACTERIA UR CULT: NORMAL
HPV HR 12 DNA CVX QL NAA+PROBE: NEGATIVE
HPV16 DNA CVX QL NAA+PROBE: NEGATIVE
HPV18 DNA CVX QL NAA+PROBE: NEGATIVE

## 2021-10-01 RX ORDER — ESTRADIOL 0.1 MG/G
1 CREAM VAGINAL 3 TIMES WEEKLY
Qty: 42.5 G | Refills: 3 | Status: SHIPPED | OUTPATIENT
Start: 2021-10-01 | End: 2021-10-31

## 2021-10-06 LAB
LAB AP GYN PRIMARY INTERPRETATION: NORMAL
Lab: NORMAL

## 2021-10-07 ENCOUNTER — TELEPHONE (OUTPATIENT)
Dept: OBGYN CLINIC | Facility: MEDICAL CENTER | Age: 61
End: 2021-10-07

## 2021-11-19 DIAGNOSIS — F41.9 ANXIETY AND DEPRESSION: ICD-10-CM

## 2021-11-19 DIAGNOSIS — I10 ESSENTIAL HYPERTENSION: ICD-10-CM

## 2021-11-19 DIAGNOSIS — F32.A ANXIETY AND DEPRESSION: ICD-10-CM

## 2021-11-19 RX ORDER — BUSPIRONE HYDROCHLORIDE 10 MG/1
10 TABLET ORAL 2 TIMES DAILY
Qty: 180 TABLET | Refills: 1 | Status: SHIPPED | OUTPATIENT
Start: 2021-11-19

## 2021-11-19 RX ORDER — VALSARTAN AND HYDROCHLOROTHIAZIDE 320; 25 MG/1; MG/1
1 TABLET, FILM COATED ORAL DAILY
Qty: 90 TABLET | Refills: 1 | Status: SHIPPED | OUTPATIENT
Start: 2021-11-19 | End: 2022-04-05 | Stop reason: HOSPADM

## 2021-11-19 RX ORDER — METOPROLOL SUCCINATE 100 MG/1
100 TABLET, EXTENDED RELEASE ORAL DAILY
Qty: 90 TABLET | Refills: 1 | Status: SHIPPED | OUTPATIENT
Start: 2021-11-19 | End: 2022-04-05 | Stop reason: HOSPADM

## 2021-12-22 ENCOUNTER — OFFICE VISIT (OUTPATIENT)
Dept: FAMILY MEDICINE CLINIC | Facility: CLINIC | Age: 61
End: 2021-12-22

## 2021-12-22 VITALS
WEIGHT: 177 LBS | HEART RATE: 86 BPM | TEMPERATURE: 98.2 F | DIASTOLIC BLOOD PRESSURE: 62 MMHG | HEIGHT: 61 IN | OXYGEN SATURATION: 98 % | SYSTOLIC BLOOD PRESSURE: 118 MMHG | BODY MASS INDEX: 33.42 KG/M2

## 2021-12-22 DIAGNOSIS — M47.816 ARTHRITIS OF LUMBAR SPINE: Primary | ICD-10-CM

## 2021-12-22 DIAGNOSIS — Z13.1 SCREENING FOR DIABETES MELLITUS: ICD-10-CM

## 2021-12-22 DIAGNOSIS — Z13.29 SCREENING FOR THYROID DISORDER: ICD-10-CM

## 2021-12-22 DIAGNOSIS — Z13.220 SCREENING FOR HYPERLIPIDEMIA: ICD-10-CM

## 2021-12-22 DIAGNOSIS — I25.2 HISTORY OF MYOCARDIAL INFARCTION: ICD-10-CM

## 2021-12-22 DIAGNOSIS — E66.09 CLASS 1 OBESITY DUE TO EXCESS CALORIES WITHOUT SERIOUS COMORBIDITY WITH BODY MASS INDEX (BMI) OF 33.0 TO 33.9 IN ADULT: ICD-10-CM

## 2021-12-22 DIAGNOSIS — Z13.0 SCREENING, ANEMIA, DEFICIENCY, IRON: ICD-10-CM

## 2021-12-22 DIAGNOSIS — Z12.4 SCREENING FOR CERVICAL CANCER: ICD-10-CM

## 2021-12-22 PROCEDURE — 99214 OFFICE O/P EST MOD 30 MIN: CPT | Performed by: NURSE PRACTITIONER

## 2021-12-22 RX ORDER — IBUPROFEN 600 MG/1
600 TABLET ORAL EVERY 6 HOURS PRN
Qty: 30 TABLET | Refills: 0 | Status: SHIPPED | OUTPATIENT
Start: 2021-12-22 | End: 2022-04-05 | Stop reason: HOSPADM

## 2021-12-27 ENCOUNTER — TELEPHONE (OUTPATIENT)
Dept: OBGYN CLINIC | Facility: CLINIC | Age: 61
End: 2021-12-27

## 2022-02-02 ENCOUNTER — APPOINTMENT (EMERGENCY)
Dept: RADIOLOGY | Facility: HOSPITAL | Age: 62
End: 2022-02-02

## 2022-02-02 ENCOUNTER — HOSPITAL ENCOUNTER (EMERGENCY)
Facility: HOSPITAL | Age: 62
Discharge: HOME | End: 2022-02-02
Attending: EMERGENCY MEDICINE

## 2022-02-02 ENCOUNTER — APPOINTMENT (EMERGENCY)
Dept: CT IMAGING | Facility: HOSPITAL | Age: 62
End: 2022-02-02

## 2022-02-02 VITALS
BODY MASS INDEX: 33.44 KG/M2 | TEMPERATURE: 99.1 F | OXYGEN SATURATION: 99 % | RESPIRATION RATE: 18 BRPM | WEIGHT: 177 LBS | DIASTOLIC BLOOD PRESSURE: 65 MMHG | HEART RATE: 73 BPM | SYSTOLIC BLOOD PRESSURE: 151 MMHG

## 2022-02-02 DIAGNOSIS — R93.89 ABNORMAL CT OF THE CHEST: Primary | ICD-10-CM

## 2022-02-02 DIAGNOSIS — D64.9 ANEMIA: ICD-10-CM

## 2022-02-02 DIAGNOSIS — R07.89 ATYPICAL CHEST PAIN: ICD-10-CM

## 2022-02-02 DIAGNOSIS — I25.2 HISTORY OF HEART ATTACK: ICD-10-CM

## 2022-02-02 DIAGNOSIS — R79.89 ABNORMAL LIVER FUNCTION TEST: ICD-10-CM

## 2022-02-02 DIAGNOSIS — R91.1 LUNG NODULE: ICD-10-CM

## 2022-02-02 DIAGNOSIS — S22.080A T12 COMPRESSION FRACTURE, INITIAL ENCOUNTER (HCC): ICD-10-CM

## 2022-02-02 DIAGNOSIS — I10 HTN (HYPERTENSION): ICD-10-CM

## 2022-02-02 LAB
2HR DELTA HS TROPONIN: 0 NG/L
ALBUMIN SERPL BCP-MCNC: 4.2 G/DL (ref 3.5–5)
ALP SERPL-CCNC: 106 U/L (ref 34–104)
ALT SERPL W P-5'-P-CCNC: 36 U/L (ref 7–52)
ANION GAP SERPL CALCULATED.3IONS-SCNC: 14 MMOL/L (ref 4–13)
APTT PPP: 25 SECONDS (ref 23–37)
AST SERPL W P-5'-P-CCNC: 150 U/L (ref 13–39)
ATRIAL RATE: 77 BPM
ATRIAL RATE: 78 BPM
ATRIAL RATE: 78 BPM
ATRIAL RATE: 83 BPM
ATRIAL RATE: 88 BPM
BASOPHILS # BLD AUTO: 0.01 THOUSANDS/ΜL (ref 0–0.1)
BASOPHILS NFR BLD AUTO: 0 % (ref 0–1)
BILIRUB SERPL-MCNC: 0.95 MG/DL (ref 0.2–1)
BUN SERPL-MCNC: 16 MG/DL (ref 5–25)
CALCIUM SERPL-MCNC: 8.7 MG/DL (ref 8.4–10.2)
CARDIAC TROPONIN I PNL SERPL HS: 5 NG/L
CARDIAC TROPONIN I PNL SERPL HS: 5 NG/L
CHLORIDE SERPL-SCNC: 100 MMOL/L (ref 96–108)
CO2 SERPL-SCNC: 24 MMOL/L (ref 21–32)
CREAT SERPL-MCNC: 1.22 MG/DL (ref 0.6–1.3)
D DIMER PPP FEU-MCNC: 0.52 UG/ML FEU
EOSINOPHIL # BLD AUTO: 0.02 THOUSAND/ΜL (ref 0–0.61)
EOSINOPHIL NFR BLD AUTO: 1 % (ref 0–6)
ERYTHROCYTE [DISTWIDTH] IN BLOOD BY AUTOMATED COUNT: 14.4 % (ref 11.6–15.1)
GFR SERPL CREATININE-BSD FRML MDRD: 48 ML/MIN/1.73SQ M
GLUCOSE SERPL-MCNC: 110 MG/DL (ref 65–140)
HCT VFR BLD AUTO: 25.7 % (ref 34.8–46.1)
HGB BLD-MCNC: 8.5 G/DL (ref 11.5–15.4)
IMM GRANULOCYTES # BLD AUTO: 0.01 THOUSAND/UL (ref 0–0.2)
IMM GRANULOCYTES NFR BLD AUTO: 0 % (ref 0–2)
INR PPP: 0.92 (ref 0.84–1.19)
LYMPHOCYTES # BLD AUTO: 1.56 THOUSANDS/ΜL (ref 0.6–4.47)
LYMPHOCYTES NFR BLD AUTO: 35 % (ref 14–44)
MCH RBC QN AUTO: 38.5 PG (ref 26.8–34.3)
MCHC RBC AUTO-ENTMCNC: 33.1 G/DL (ref 31.4–37.4)
MCV RBC AUTO: 116 FL (ref 82–98)
MONOCYTES # BLD AUTO: 0.56 THOUSAND/ΜL (ref 0.17–1.22)
MONOCYTES NFR BLD AUTO: 13 % (ref 4–12)
NEUTROPHILS # BLD AUTO: 2.25 THOUSANDS/ΜL (ref 1.85–7.62)
NEUTS SEG NFR BLD AUTO: 51 % (ref 43–75)
NRBC BLD AUTO-RTO: 0 /100 WBCS
P AXIS: 14 DEGREES
P AXIS: 24 DEGREES
P AXIS: 35 DEGREES
P AXIS: 37 DEGREES
P AXIS: 40 DEGREES
PLATELET # BLD AUTO: 218 THOUSANDS/UL (ref 149–390)
PMV BLD AUTO: 8.6 FL (ref 8.9–12.7)
POTASSIUM SERPL-SCNC: 3.6 MMOL/L (ref 3.5–5.3)
PR INTERVAL: 180 MS
PR INTERVAL: 186 MS
PR INTERVAL: 188 MS
PR INTERVAL: 188 MS
PR INTERVAL: 196 MS
PROT SERPL-MCNC: 6.8 G/DL (ref 6.4–8.4)
PROTHROMBIN TIME: 12.3 SECONDS (ref 11.6–14.5)
QRS AXIS: 29 DEGREES
QRS AXIS: 48 DEGREES
QRS AXIS: 50 DEGREES
QRS AXIS: 53 DEGREES
QRS AXIS: 53 DEGREES
QRSD INTERVAL: 136 MS
QRSD INTERVAL: 138 MS
QRSD INTERVAL: 140 MS
QRSD INTERVAL: 142 MS
QRSD INTERVAL: 142 MS
QT INTERVAL: 438 MS
QT INTERVAL: 440 MS
QT INTERVAL: 450 MS
QT INTERVAL: 466 MS
QT INTERVAL: 476 MS
QTC INTERVAL: 509 MS
QTC INTERVAL: 517 MS
QTC INTERVAL: 529 MS
QTC INTERVAL: 531 MS
QTC INTERVAL: 542 MS
RBC # BLD AUTO: 2.21 MILLION/UL (ref 3.81–5.12)
SODIUM SERPL-SCNC: 138 MMOL/L (ref 135–147)
T WAVE AXIS: -2 DEGREES
T WAVE AXIS: -21 DEGREES
T WAVE AXIS: 1 DEGREES
T WAVE AXIS: 6 DEGREES
T WAVE AXIS: 9 DEGREES
VENTRICULAR RATE: 77 BPM
VENTRICULAR RATE: 78 BPM
VENTRICULAR RATE: 78 BPM
VENTRICULAR RATE: 83 BPM
VENTRICULAR RATE: 88 BPM
WBC # BLD AUTO: 4.41 THOUSAND/UL (ref 4.31–10.16)

## 2022-02-02 PROCEDURE — 96375 TX/PRO/DX INJ NEW DRUG ADDON: CPT

## 2022-02-02 PROCEDURE — 93005 ELECTROCARDIOGRAM TRACING: CPT

## 2022-02-02 PROCEDURE — 74174 CTA ABD&PLVS W/CONTRAST: CPT

## 2022-02-02 PROCEDURE — 85025 COMPLETE CBC W/AUTO DIFF WBC: CPT | Performed by: EMERGENCY MEDICINE

## 2022-02-02 PROCEDURE — 84484 ASSAY OF TROPONIN QUANT: CPT | Performed by: EMERGENCY MEDICINE

## 2022-02-02 PROCEDURE — 71046 X-RAY EXAM CHEST 2 VIEWS: CPT

## 2022-02-02 PROCEDURE — 85610 PROTHROMBIN TIME: CPT | Performed by: EMERGENCY MEDICINE

## 2022-02-02 PROCEDURE — 93010 ELECTROCARDIOGRAM REPORT: CPT | Performed by: INTERNAL MEDICINE

## 2022-02-02 PROCEDURE — G1004 CDSM NDSC: HCPCS

## 2022-02-02 PROCEDURE — 99285 EMERGENCY DEPT VISIT HI MDM: CPT

## 2022-02-02 PROCEDURE — 85730 THROMBOPLASTIN TIME PARTIAL: CPT | Performed by: EMERGENCY MEDICINE

## 2022-02-02 PROCEDURE — 85379 FIBRIN DEGRADATION QUANT: CPT | Performed by: EMERGENCY MEDICINE

## 2022-02-02 PROCEDURE — 36415 COLL VENOUS BLD VENIPUNCTURE: CPT | Performed by: EMERGENCY MEDICINE

## 2022-02-02 PROCEDURE — 96374 THER/PROPH/DIAG INJ IV PUSH: CPT

## 2022-02-02 PROCEDURE — 80053 COMPREHEN METABOLIC PANEL: CPT | Performed by: EMERGENCY MEDICINE

## 2022-02-02 PROCEDURE — 99285 EMERGENCY DEPT VISIT HI MDM: CPT | Performed by: EMERGENCY MEDICINE

## 2022-02-02 PROCEDURE — 71275 CT ANGIOGRAPHY CHEST: CPT

## 2022-02-02 RX ORDER — METHYLPREDNISOLONE SODIUM SUCCINATE 125 MG/2ML
125 INJECTION, POWDER, LYOPHILIZED, FOR SOLUTION INTRAMUSCULAR; INTRAVENOUS ONCE
Status: COMPLETED | OUTPATIENT
Start: 2022-02-02 | End: 2022-02-02

## 2022-02-02 RX ORDER — AZITHROMYCIN 250 MG/1
TABLET, FILM COATED ORAL
Qty: 6 TABLET | Refills: 0 | Status: SHIPPED | OUTPATIENT
Start: 2022-02-02 | End: 2022-02-06

## 2022-02-02 RX ORDER — NITROGLYCERIN 0.4 MG/1
0.4 TABLET SUBLINGUAL ONCE
Status: COMPLETED | OUTPATIENT
Start: 2022-02-02 | End: 2022-02-02

## 2022-02-02 RX ORDER — DIPHENHYDRAMINE HYDROCHLORIDE 50 MG/ML
50 INJECTION INTRAMUSCULAR; INTRAVENOUS ONCE
Status: COMPLETED | OUTPATIENT
Start: 2022-02-02 | End: 2022-02-02

## 2022-02-02 RX ORDER — ASPIRIN 325 MG
325 TABLET ORAL ONCE
Status: COMPLETED | OUTPATIENT
Start: 2022-02-02 | End: 2022-02-02

## 2022-02-02 RX ADMIN — NITROGLYCERIN 0.4 MG: 0.4 TABLET SUBLINGUAL at 09:47

## 2022-02-02 RX ADMIN — ASPIRIN 325 MG: 325 TABLET ORAL at 09:47

## 2022-02-02 RX ADMIN — DIPHENHYDRAMINE HYDROCHLORIDE 50 MG: 50 INJECTION INTRAMUSCULAR; INTRAVENOUS at 09:35

## 2022-02-02 RX ADMIN — METHYLPREDNISOLONE SODIUM SUCCINATE 125 MG: 125 INJECTION, POWDER, FOR SOLUTION INTRAMUSCULAR; INTRAVENOUS at 09:35

## 2022-02-02 RX ADMIN — IOHEXOL 100 ML: 350 INJECTION, SOLUTION INTRAVENOUS at 09:30

## 2022-02-02 NOTE — ED PROVIDER NOTES
History  Chief Complaint   Patient presents with    Chest Pain     HPI    This is a 61-year-old female presents to the emergency department via private vehicle with her  with a chief complaint substernal chest pain with radiation to her back without associated diaphoresis or shortness of breath  This awoke patient from a sound sleep at 4:00 a m  and has been persistent since that time  Patient did vomited 3 times this morning prior to arrival     Through the patient's chart (PCP: RUBIN Rice: [facility], last visit 21), noted the patient has a history of class 1 obesity, anxiety, chronic heartburn, hypertension, history of a MI  with no subsequent cardiac catheterization intervention with a outpatient treadmill stress test at that time and deemed to be negative as per the patient, history of blood transfusion , history of depression  PSHx: Gastric bypass; Oophorectomy (Right); Cholecystectomy; Breast surgery (Right, );  section (, ); and Breast cyst excision (Right,   benign)  Patient is fully vaccinated [id number], and the patient does report that she consumes 2-3 alcoholic drinks a night  Prior to Admission Medications   Prescriptions Last Dose Informant Patient Reported?  Taking?   busPIRone (BUSPAR) 10 mg tablet   No No   Sig: Take 1 tablet (10 mg total) by mouth 2 (two) times a day   diphenhydrAMINE (Benadryl Allergy) 25 mg capsule  Self Yes No   Sig: Take 25 mg by mouth every 6 (six) hours as needed for itching     estradiol (ESTRACE) 0 1 mg/g vaginal cream   No No   Sig: Insert 1 g into the vagina 3 (three) times a week Apply a pea sized amount to external vaginal opening and urethra area twice weekly   ibuprofen (MOTRIN) 600 mg tablet   No No   Sig: Take 1 tablet (600 mg total) by mouth every 6 (six) hours as needed for moderate pain   metoprolol succinate (TOPROL-XL) 100 mg 24 hr tablet   No No   Sig: Take 1 tablet (100 mg total) by mouth daily   valsartan-hydrochlorothiazide (DIOVAN-HCT) 320-25 MG per tablet   No No   Sig: Take 1 tablet by mouth daily      Facility-Administered Medications: None       Past Medical History:   Diagnosis Date    Anxiety     Heart attack ([address] ) 2006    History of blood transfusion     History of depression     Hypertension        Past Surgical History:   Procedure Laterality Date    BREAST CYST EXCISION Right 1999  benign    BREAST SURGERY Right 1999    calcifications      SECTION  ,     CHOLECYSTECTOMY      GASTRIC BYPASS      OOPHORECTOMY Right        Family History   Problem Relation Age of Onset    Diabetes Mother     Heart attack Father     Diabetes Sister     Substance Abuse Brother     Diabetes Brother     No Known Problems Maternal Grandmother     Breast cancer Paternal Grandmother     No Known Problems Sister     No Known Problems Sister     No Known Problems Maternal Aunt     No Known Problems Maternal Aunt     No Known Problems Maternal Aunt     No Known Problems Paternal Aunt     No Known Problems Paternal Aunt     No Known Problems Paternal Aunt      I have reviewed and agree with the history as documented  E-Cigarette/Vaping    E-Cigarette Use Never User      E-Cigarette/Vaping Substances    Nicotine No     THC No     CBD No     Flavoring No     Other No     Unknown No      Social History     Tobacco Use    Smoking status: Never Smoker    Smokeless tobacco: Never Used   Vaping Use    Vaping Use: Never used   Substance Use Topics    Alcohol use: Yes    Drug use: Not Currently       Review of Systems   Constitutional: Negative  HENT: Negative  Eyes: Negative  Respiratory: Positive for chest tightness  Negative for shortness of breath  Gastrointestinal: Negative  Negative for abdominal pain  Endocrine: Negative  Genitourinary: Negative  Musculoskeletal: Positive for back pain  Skin: Negative      Allergic/Immunologic: Negative  Neurological: Negative  Hematological: Negative  Psychiatric/Behavioral: Negative  Physical Exam  Physical Exam  Vitals and nursing note reviewed  Constitutional:       Appearance: She is well-developed and normal weight  HENT:      Head: Normocephalic and atraumatic  Eyes:      Extraocular Movements: Extraocular movements intact  Pupils: Pupils are equal, round, and reactive to light  Cardiovascular:      Rate and Rhythm: Normal rate and regular rhythm  Heart sounds: Normal heart sounds  Pulmonary:      Effort: Pulmonary effort is normal  No tachypnea  Breath sounds: Normal breath sounds  Abdominal:      General: Bowel sounds are normal       Palpations: Abdomen is soft  Musculoskeletal:         General: Normal range of motion  Cervical back: Normal range of motion  Right lower leg: No edema  Left lower leg: No edema  Skin:     General: Skin is warm  Capillary Refill: Capillary refill takes less than 2 seconds  Neurological:      General: No focal deficit present  Mental Status: She is alert and oriented to person, place, and time     Psychiatric:         Mood and Affect: Mood normal          Behavior: Behavior normal          Vital Signs  ED Triage Vitals   Temperature Pulse Respirations Blood Pressure SpO2   02/02/22 0832 02/02/22 0832 02/02/22 0832 02/02/22 0832 02/02/22 0832   99 3 °F (37 4 °C) 77 18 167/74 100 %      Temp Source Heart Rate Source Patient Position - Orthostatic VS BP Location FiO2 (%)   02/02/22 1248 02/02/22 0832 -- 02/02/22 0945 --   Oral Monitor  Left arm       Pain Score       02/02/22 0832       6           Vitals:    02/02/22 1000 02/02/22 1045 02/02/22 1115 02/02/22 1248   BP: 120/76 146/65 126/60 151/65   Pulse: 86 88 77 73         Visual Acuity      ED Medications  Medications   diphenhydrAMINE (BENADRYL) injection 50 mg (50 mg Intravenous Given 2/2/22 0935)   methylPREDNISolone sodium succinate (Solu-MEDROL) injection 125 mg (125 mg Intravenous Given 2/2/22 0935)   iohexol (OMNIPAQUE) 350 MG/ML injection (SINGLE-DOSE) 100 mL (100 mL Intravenous Given 2/2/22 0930)   aspirin tablet 325 mg (325 mg Oral Given 2/2/22 0947)   nitroglycerin (NITROSTAT) SL tablet 0 4 mg (0 4 mg Sublingual Given 2/2/22 0947)       Diagnostic Studies  Results Reviewed     Procedure Component Value Units Date/Time    HS Troponin I 2hr [[id number]]  (Normal) Collected: 02/02/22 1037    Lab Status: Final result Specimen: Blood from Arm, Right Updated: 02/02/22 1116     hs TnI 2hr 5 ng/L      Delta 2hr hsTnI 0 ng/L     HS Troponin 0hr (reflex protocol) [[id number]]  (Normal) Collected: 02/02/22 0841    Lab Status: Final result Specimen: Blood from Arm, Right Updated: 02/02/22 0912     hs TnI 0hr 5 ng/L     D-Dimer [[id number]]  (Abnormal) Collected: 02/02/22 0841    Lab Status: Final result Specimen: Blood from Arm, Right Updated: 02/02/22 0911     D-Dimer, Quant 0 52 ug/ml [facility]     Comprehensive metabolic panel [[id number]]  (Abnormal) Collected: 02/02/22 0841    Lab Status: Final result Specimen: Blood from Arm, Right Updated: 02/02/22 0905     Sodium 138 mmol/L      Potassium 3 6 mmol/L      Chloride 100 mmol/L      CO2 24 mmol/L      ANION GAP 14 mmol/L      BUN 16 mg/dL      Creatinine 1 22 mg/dL      Glucose 110 mg/dL      Calcium 8 7 mg/dL       U/L      ALT 36 U/L      Alkaline Phosphatase 106 U/L      Total Protein 6 8 g/dL      Albumin 4 2 g/dL      Total Bilirubin 0 95 mg/dL      eGFR 48 ml/min/1 73sq m     Narrative:      Luis A guidelines for Chronic Kidney Disease (CKD):     Stage 1 with normal or high GFR (GFR > 90 mL/min/1 73 square meters)    Stage 2 Mild CKD (GFR = 60-89 mL/min/1 73 square meters)    Stage 3A Moderate CKD (GFR = 45-59 mL/min/1 73 square meters)    Stage 3B Moderate CKD (GFR = 30-44 mL/min/1 73 square meters)    Stage 4 Severe CKD (GFR = 15-29 mL/min/1 73 square meters)    Stage 5 End Stage CKD (GFR <15 mL/min/1 73 square meters)  Note: GFR calculation is accurate only with a steady state creatinine    Protime-INR [[id number]]  (Normal) Collected: 02/02/22 0841    Lab Status: Final result Specimen: Blood from Arm, Right Updated: 02/02/22 0900     Protime 12 3 seconds      INR 0 92    APTT [[id number]]  (Normal) Collected: 02/02/22 0841    Lab Status: Final result Specimen: Blood from Arm, Right Updated: 02/02/22 0900     PTT 25 seconds     CBC and differential [[id number]]  (Abnormal) Collected: 02/02/22 0841    Lab Status: Final result Specimen: Blood from Arm, Right Updated: 02/02/22 0847     WBC 4 41 Thousand/uL      RBC 2 21 Million/uL      Hemoglobin 8 5 g/dL      Hematocrit 25 7 %       fL      MCH 38 5 pg      MCHC 33 1 g/dL      RDW 14 4 %      MPV 8 6 fL      Platelets 253 Thousands/uL      nRBC 0 /100 WBCs      Neutrophils Relative 51 %      Immat GRANS % 0 %      Lymphocytes Relative 35 %      Monocytes Relative 13 %      Eosinophils Relative 1 %      Basophils Relative 0 %      Neutrophils Absolute 2 25 Thousands/µL      Immature Grans Absolute 0 01 Thousand/uL      Lymphocytes Absolute 1 56 Thousands/µL      Monocytes Absolute 0 56 Thousand/µL      Eosinophils Absolute 0 02 Thousand/µL      Basophils Absolute 0 01 Thousands/µL                  CTA dissection protocol chest abdomen pelvis w wo contrast   Final Result by Yudith Magurie MD (02/02 1032)      1  No evidence of acute aortic pathology  2   New small scattered patchy peripheral opacities in the lungs, which may be secondary to infection or inflammation  3   Compression deformity of the vertebral body T12, of indeterminate age, however is new when compared to the prior CT from April 15, 2019       4  Increase in size and morphology of a lung nodule at the left lower lobe, now measuring up to 4 mm in average size  Follow-up noncontrast chest CT in 6-12 months is recommended  Workstation performed: [id number]         XR chest 2 views   Final Result by Familia Baptiste MD (15/70 0857)      No acute cardiopulmonary disease  Workstation performed: [id number]                    Procedures  ECG 12 Lead Documentation Only    Date/Time: 2/2/2022 8:34 AM  Performed by: Samina Franklin DO  Authorized by: Delmar Argueta III, DO     Indications / Diagnosis:  Chest pain  ECG reviewed by me, the ED Provider: yes    Patient location:  ED  Comments:      I personally reviewed this EKG that was performed the patient February 2, 2022, EKG was completed at 8:33 a m  interpreted by me at 8:34 a m , normal sinus rhythm with evidence of a left bundle-branch block, ventricular rate of 77 beats per minute, no prior EKGs to compare to  ECG 12 Lead Documentation Only    Date/Time: 2/2/2022 10:15 AM  Performed by: Samina Franklin DO  Authorized by: Delmar Score III, DO     Indications / Diagnosis:  Chest pain  ECG reviewed by me, the ED Provider: yes    Patient location:  ED  Comments:      I personally reviewed this EKG that was performed on the patient February 2, 2022, EKG was completed at 10:14 a m , interpreted by me at 10:15 a m , normal sinus rhythm with left bundle branch block unchanged from prior tracings  ED Course  ED Course as of 02/02/22 1320   Wed Feb 02, 2022   6530 History and physical completed  Orders placed  0848 Noted patient's hemoglobin of 8 5 with hematocrit 27 7 with normal platelets, baseline 1 year ago was 11 6 and 36 respectively  8356 Reviewed CT imaging hx w/ patient, patient has allergy to shellfish, has required benadryl and steroid infusion prior, no hx of CT imaging dye allergy, + d-dimer +, with back pain: thoracic area, concern for dissection     1337 Patient is reassessed after CT scan imaging, patient reports that she is having mild itchiness,  patient maintaining airway maintaining secretions  No stridor  No brawniness under the tongue  Uvula midline without edema  Reviewed with patient about her hemoglobin being 8 5, history of iron deficiency anemia related to gastric bypass  Patient reports her stools been within normal limits  1002 Patient complaining of persistent chest pain, CT pending first set of cardiac enzymes: 5, nitro SL x 1 given, pain decreased to 5/10 from 6/10, BP from 911 systolic to 264 systolic, will repeat EKG  1041 CT showed: 1  No evidence of acute aortic pathology      2  New small scattered patchy peripheral opacities in the lungs, which may be secondary to infection or inflammation      3  Compression deformity of the vertebral body T12, of indeterminate age, however is new when compared to the prior CT from April 15, 2019      4  Increase in size and morphology of a lung nodule at the left lower lobe, now measuring up to 4 mm in average size  Follow-up noncontrast chest CT in 6-12 months is recommended  1255 Reviewed necessary diagnostic data with the patient specifically the hemoglobin levels and LFTs  Also reviewed the CT imaging findings with the patient, noted that the patient had a temperature 99 1° in light of the new scratchy patchy opacities along will proceed with starting the patient on Zithromax  Noted patient has a subacute vertebral compression fraction of T12, referred patient to your surgery as an outpatient  Patient has an appointment with her PCP on February 22nd of this month, advised patient to follow-up regarding this particular findings along with a incidental finding of a lung nodule was increase in size 4 mm of the left lower lobe, patient just had a bowel movement while in the emergency department and declined a rectal exam to further delineate the patient's anemia  Patient has a history of iron-deficiency anemia related to gastric bypass  Patient stable for discharge               HEART Risk Score      Most Recent Value Heart Score Risk Calculator    History 1 Filed at: 02/02/2022 1043   ECG 0 Filed at: 02/02/2022 1043   Age 1 Filed at: 02/02/2022 1043   Risk Factors 1 Filed at: 02/02/2022 1043   Troponin 0 Filed at: 02/02/2022 1043   HEART Score 3 Filed at: 02/02/2022 1043                        SBIRT 20yo+      Most Recent Value   SBIRT (23 yo +)    In order to provide better care to our patients, we are screening all of our patients for alcohol and drug use  Would it be okay to ask you these screening questions? Yes Filed at: 02/02/2022 6629   Initial Alcohol Screen: US AUDIT-C     1  How often do you have a drink containing alcohol? 0 Filed at: 02/02/2022 0949   2  How many drinks containing alcohol do you have on a typical day you are drinking? 0 Filed at: 02/02/2022 0949   3a  Male UNDER 65: How often do you have five or more drinks on one occasion? 0 Filed at: 02/02/2022 0949   3b  FEMALE Any Age, or MALE 65+: How often do you have 4 or more drinks on one occassion? 0 Filed at: 02/02/2022 0949   Audit-C Score 0 Filed at: 02/02/2022 4461   AIYANA: How many times in the past year have you    Used an illegal drug or used a prescription medication for non-medical reasons?  Never Filed at: 02/02/2022 8635                    MDM  Number of Diagnoses or Management Options  Abnormal CT of the chest  Abnormal liver function test  Anemia  Atypical chest pain  HTN (hypertension)  Lung nodule  T12 compression fracture, initial encounter [facility])  Diagnosis management comments: 24-year-old female presents the emergency department with nonexertional atypical chest pain, two 2 sets of cardiac enzymes negative, patient has a left bundle-branch block with no changes based on Sgarbossa criteria, prior history of cholecystectomy, due to the chest pain due to her back with an elevated high blood pressure a CTA of the chest ordered in order to rule out aortic dissection was negative, there was significant amount of incidental findings found on the CT scan, see ED course for specifics  Patient had some mild thoracic back pain with compression fracture that was age indeterminate T12, patient has no lower neurological symptoms from this and no history of trauma  Patient be referred to Spine surgery and [facility] for this particular finding  Patient was started on Zithromax for the possible inflammation in the lungs, normal white count, afebrile currently  Patient is fully immunized with looser immunizations as of and December  Patient does have a history of iron deficiency anemia, hemoglobin is decreased today, see ED course for trends, patient had a bowel movement just prior to reassessment at the time of discharge, rectal exam deferred (most likely have no stool within the rectal vault), patient will follow-up with her PCP February 22, 2022 for follow-up regarding this visit  Patient referred to Cardiology, ambulatory referral   Please note the patient did have some mild itchiness after the CAT scan dye, patient is reassessed multiple times had no evidence of airway involvement  Patient was given steroids and Benadryl  Also noted the patient had elevated liver function test which she has had in the past, patient consumes 2-3 alcoholic drinks a night, anticipatory guidance given the patient regarding need to decrease the amount alcohol she consumes, all information and discharge instructions and anticipatory guidance given to the patient and her significant other at the bedside  All information from the incidental findings on the CT scans of the chest were relayed to the patient the time of discharge in placed into the discharge instructions  Portions of the record may have been created with voice recognition software  Occasional wrong word or "sound a like" substitutions may have occurred due to the inherent limitations of voice recognition software   Read the chart carefully and recognize, using context, where substitutions have occurred  Counseling: I had a detailed discussion with the patient and/or guardian regarding: the historical points, exam findings, and any diagnostic results supporting the discharge diagnosis, lab results, radiology results, discharge instructions reviewed with patient and/or family/caregiver and understanding was verbalized  Instructions given to return to the emergency department if symptoms worsen or persist, or if there are any questions or concerns that arise at home         Amount and/or Complexity of Data Reviewed  Clinical lab tests: ordered and reviewed  Tests in the radiology section of CPT®: ordered and reviewed  Tests in the medicine section of CPT®: ordered and reviewed        Disposition  Final diagnoses:   Abnormal CT of the chest   Lung nodule   HTN (hypertension)   Abnormal liver function test   Anemia   Atypical chest pain   T12 compression fracture, initial encounter ([facility] )     Time reflects when diagnosis was documented in both MDM as applicable and the Disposition within this note     Time User Action Codes Description Comment    2/2/2022  1:00 PM Loletta Cyphers Add [R93 89] Abnormal CT of the chest     2/2/2022  1:00 PM Shawn Navneet [R91 1] Lung nodule     2/2/2022  1:01 PM Alva Venegas HTN (hypertension)     2/2/2022  1:01 PM Loletta Cyphers Add [R94 5] Abnormal liver function test     2/2/2022  1:01 PM Loletta Cyphers Add [D64 9] Anemia     2/2/2022  1:01 PM Loletta Cyphers Add [R07 89] Atypical chest pain     2/2/2022  1:02 PM Loletta Cyphers Add [J28 885F] T12 compression fracture, initial encounter [facility])       ED Disposition     ED Disposition Condition Date/Time Comment    Discharge Stable Wed Feb 2, 2022  1:01 PM Malathi Aguirre discharge to home/self care              Follow-up Information     Follow up With Specialties Details [address]  Internal Medicine, Nurse Practitioner   [place] [id number] Luciano Mcdowell MD Neurosurgery   [address] [place] [address] Jocy Perez [id number] [phone]            Patient's Medications   Discharge Prescriptions    AZITHROMYCIN (ZITHROMAX) 250 MG TABLET    Take 2 tablets today then 1 tablet daily x 4 days       Start Date: 2/2/2022  End Date: 2/6/2022       Order Dose: --       Quantity: 6 tablet    Refills: 0           PDMP Review     None          ED Provider  Electronically Signed by           Shaka Zelaya III, DO  02/02/22 4342

## 2022-02-02 NOTE — DISCHARGE INSTRUCTIONS
YOUR CT OF THE CHEST SHOWED THE FOLLOWING:  New small scattered patchy peripheral opacities in the lungs, which may be secondary to infection or inflammation  Compression deformity of the vertebral body T12, of indeterminate age, however is new when compared to the prior CT from April 15, 2019  Increase in size and morphology of a lung nodule at the left lower lobe, now measuring up to 4 mm in average size  Follow-up noncontrast chest CT in 6-12 months is recommended

## 2022-02-03 ENCOUNTER — TELEPHONE (OUTPATIENT)
Dept: PHYSICAL THERAPY | Facility: OTHER | Age: 62
End: 2022-02-03

## 2022-02-03 NOTE — TELEPHONE ENCOUNTER
Message left for patient regarding referral entered for  Comprehensive Sp[facility] Program  Nurse requested patient CB if needed and leave Full Name &  on VM  One of the nurses will return the call to discuss the program further      Referral deferred for f/u attempt    CF indeterminate age

## 2022-02-10 ENCOUNTER — TELEPHONE (OUTPATIENT)
Dept: PHYSICAL THERAPY | Facility: OTHER | Age: 62
End: 2022-02-10

## 2022-02-17 ENCOUNTER — TELEPHONE (OUTPATIENT)
Dept: PHYSICAL THERAPY | Facility: OTHER | Age: 62
End: 2022-02-17

## 2022-03-28 ENCOUNTER — HOSPITAL ENCOUNTER (INPATIENT)
Facility: HOSPITAL | Age: 62
LOS: 8 days | Discharge: HOME | DRG: 249 | End: 2022-04-05
Attending: EMERGENCY MEDICINE | Admitting: INTERNAL MEDICINE
Payer: COMMERCIAL

## 2022-03-28 ENCOUNTER — APPOINTMENT (EMERGENCY)
Dept: CT IMAGING | Facility: HOSPITAL | Age: 62
DRG: 249 | End: 2022-03-28
Payer: COMMERCIAL

## 2022-03-28 ENCOUNTER — APPOINTMENT (INPATIENT)
Dept: RADIOLOGY | Facility: HOSPITAL | Age: 62
DRG: 249 | End: 2022-03-28
Payer: COMMERCIAL

## 2022-03-28 DIAGNOSIS — N39.0 UTI (URINARY TRACT INFECTION): ICD-10-CM

## 2022-03-28 DIAGNOSIS — Z98.84 S/P GASTRIC BYPASS: ICD-10-CM

## 2022-03-28 DIAGNOSIS — E87.1 HYPONATREMIA: Primary | ICD-10-CM

## 2022-03-28 DIAGNOSIS — J34.89 RHINORRHEA: ICD-10-CM

## 2022-03-28 DIAGNOSIS — R00.1 BRADYCARDIA: ICD-10-CM

## 2022-03-28 DIAGNOSIS — N17.9 ACUTE KIDNEY INJURY (HCC): ICD-10-CM

## 2022-03-28 DIAGNOSIS — R11.0 NAUSEA: ICD-10-CM

## 2022-03-28 DIAGNOSIS — M79.602 LEFT ARM PAIN: ICD-10-CM

## 2022-03-28 DIAGNOSIS — K12.30 MUCOSITIS: ICD-10-CM

## 2022-03-28 DIAGNOSIS — R53.83 FATIGUE: ICD-10-CM

## 2022-03-28 DIAGNOSIS — D53.9 MACROCYTIC ANEMIA: ICD-10-CM

## 2022-03-28 DIAGNOSIS — I10 ESSENTIAL HYPERTENSION: ICD-10-CM

## 2022-03-28 DIAGNOSIS — K92.1 MELENA: ICD-10-CM

## 2022-03-28 DIAGNOSIS — K52.9 GASTROENTERITIS: ICD-10-CM

## 2022-03-28 PROBLEM — R53.1 GENERALIZED WEAKNESS: Status: ACTIVE | Noted: 2022-03-28

## 2022-03-28 PROBLEM — R79.89 ELEVATED TSH: Status: RESOLVED | Noted: 2022-03-28 | Resolved: 2022-03-28

## 2022-03-28 PROBLEM — E87.2 HIGH ANION GAP METABOLIC ACIDOSIS: Status: ACTIVE | Noted: 2022-03-28

## 2022-03-28 PROBLEM — E87.6 HYPOKALEMIA: Status: ACTIVE | Noted: 2022-03-28

## 2022-03-28 PROBLEM — E83.42 HYPOMAGNESEMIA: Status: ACTIVE | Noted: 2022-03-28

## 2022-03-28 PROBLEM — K14.6 PAINFUL TONGUE: Status: ACTIVE | Noted: 2022-03-28

## 2022-03-28 PROBLEM — N30.00 ACUTE CYSTITIS WITHOUT HEMATURIA: Status: ACTIVE | Noted: 2022-03-28

## 2022-03-28 PROBLEM — R79.89 ELEVATED TSH: Status: ACTIVE | Noted: 2022-03-28

## 2022-03-28 LAB
2HR DELTA HS TROPONIN: -1 NG/L
4HR DELTA HS TROPONIN: -1 NG/L
ALBUMIN SERPL BCP-MCNC: 3.9 G/DL (ref 3.5–5)
ALP SERPL-CCNC: 157 U/L (ref 34–104)
ALT SERPL W P-5'-P-CCNC: 31 U/L (ref 7–52)
ANION GAP SERPL CALCULATED.3IONS-SCNC: 12 MMOL/L (ref 4–13)
ANION GAP SERPL CALCULATED.3IONS-SCNC: 13 MMOL/L (ref 4–13)
ANION GAP SERPL CALCULATED.3IONS-SCNC: 17 MMOL/L (ref 4–13)
AST SERPL W P-5'-P-CCNC: 55 U/L (ref 13–39)
ATRIAL RATE: 91 BPM
ATRIAL RATE: 99 BPM
BACTERIA UR QL AUTO: ABNORMAL /HPF
BASOPHILS # BLD AUTO: 0.03 THOUSANDS/ΜL (ref 0–0.1)
BASOPHILS NFR BLD AUTO: 1 % (ref 0–1)
BILIRUB SERPL-MCNC: 1.36 MG/DL (ref 0.2–1)
BILIRUB UR QL STRIP: NEGATIVE
BUN SERPL-MCNC: 17 MG/DL (ref 5–25)
BUN SERPL-MCNC: 19 MG/DL (ref 5–25)
BUN SERPL-MCNC: 22 MG/DL (ref 5–25)
CALCIUM SERPL-MCNC: 7.9 MG/DL (ref 8.4–10.2)
CALCIUM SERPL-MCNC: 8.2 MG/DL (ref 8.4–10.2)
CALCIUM SERPL-MCNC: 8.8 MG/DL (ref 8.4–10.2)
CARDIAC TROPONIN I PNL SERPL HS: 7 NG/L
CARDIAC TROPONIN I PNL SERPL HS: 7 NG/L
CARDIAC TROPONIN I PNL SERPL HS: 8 NG/L
CHLORIDE SERPL-SCNC: 84 MMOL/L (ref 96–108)
CHLORIDE SERPL-SCNC: 87 MMOL/L (ref 96–108)
CHLORIDE SERPL-SCNC: 91 MMOL/L (ref 96–108)
CHLORIDE UR-SCNC: 17 MMOL/L
CLARITY UR: CLEAR
CO2 SERPL-SCNC: 19 MMOL/L (ref 21–32)
CO2 SERPL-SCNC: 19 MMOL/L (ref 21–32)
CO2 SERPL-SCNC: 20 MMOL/L (ref 21–32)
COLOR UR: YELLOW
CREAT SERPL-MCNC: 1.51 MG/DL (ref 0.6–1.3)
CREAT SERPL-MCNC: 1.56 MG/DL (ref 0.6–1.3)
CREAT SERPL-MCNC: 1.73 MG/DL (ref 0.6–1.3)
CREAT UR-MCNC: 56.4 MG/DL
EOSINOPHIL # BLD AUTO: 0.19 THOUSAND/ΜL (ref 0–0.61)
EOSINOPHIL NFR BLD AUTO: 3 % (ref 0–6)
ERYTHROCYTE [DISTWIDTH] IN BLOOD BY AUTOMATED COUNT: 12.9 % (ref 11.6–15.1)
FERRITIN SERPL-MCNC: 402 NG/ML (ref 8–388)
FLUAV RNA RESP QL NAA+PROBE: NEGATIVE
FLUBV RNA RESP QL NAA+PROBE: NEGATIVE
FOLATE SERPL-MCNC: 2.1 NG/ML (ref 3.1–17.5)
GFR SERPL CREATININE-BSD FRML MDRD: 31 ML/MIN/1.73SQ M
GFR SERPL CREATININE-BSD FRML MDRD: 36 ML/MIN/1.73SQ M
GFR SERPL CREATININE-BSD FRML MDRD: 37 ML/MIN/1.73SQ M
GLUCOSE SERPL-MCNC: 106 MG/DL (ref 65–140)
GLUCOSE SERPL-MCNC: 119 MG/DL (ref 65–140)
GLUCOSE SERPL-MCNC: 127 MG/DL (ref 65–140)
GLUCOSE UR STRIP-MCNC: NEGATIVE MG/DL
HCT VFR BLD AUTO: 23.5 % (ref 34.8–46.1)
HGB BLD-MCNC: 8.6 G/DL (ref 11.5–15.4)
HGB UR QL STRIP.AUTO: NEGATIVE
IMM GRANULOCYTES # BLD AUTO: 0.03 THOUSAND/UL (ref 0–0.2)
IMM GRANULOCYTES NFR BLD AUTO: 1 % (ref 0–2)
IRON SATN MFR SERPL: 86 % (ref 15–50)
IRON SERPL-MCNC: 233 UG/DL (ref 50–170)
KETONES UR STRIP-MCNC: NEGATIVE MG/DL
LEUKOCYTE ESTERASE UR QL STRIP: ABNORMAL
LIPASE SERPL-CCNC: 145 U/L (ref 11–82)
LYMPHOCYTES # BLD AUTO: 1.64 THOUSANDS/ΜL (ref 0.6–4.47)
LYMPHOCYTES NFR BLD AUTO: 25 % (ref 14–44)
MAGNESIUM SERPL-MCNC: 1 MG/DL (ref 1.9–2.7)
MAGNESIUM SERPL-MCNC: 2.3 MG/DL (ref 1.9–2.7)
MCH RBC QN AUTO: 40.6 PG (ref 26.8–34.3)
MCHC RBC AUTO-ENTMCNC: 36.6 G/DL (ref 31.4–37.4)
MCV RBC AUTO: 111 FL (ref 82–98)
MONOCYTES # BLD AUTO: 0.63 THOUSAND/ΜL (ref 0.17–1.22)
MONOCYTES NFR BLD AUTO: 10 % (ref 4–12)
NEUTROPHILS # BLD AUTO: 3.99 THOUSANDS/ΜL (ref 1.85–7.62)
NEUTS SEG NFR BLD AUTO: 60 % (ref 43–75)
NITRITE UR QL STRIP: NEGATIVE
NON-SQ EPI CELLS URNS QL MICRO: ABNORMAL /HPF
NRBC BLD AUTO-RTO: 0 /100 WBCS
OSMOLALITY UR/SERPL-RTO: 256 MMOL/KG (ref 282–298)
OSMOLALITY UR: 139 MMOL/KG
P AXIS: 52 DEGREES
P AXIS: 55 DEGREES
PH UR STRIP.AUTO: 6 [PH]
PLATELET # BLD AUTO: 289 THOUSANDS/UL (ref 149–390)
PMV BLD AUTO: 9 FL (ref 8.9–12.7)
POTASSIUM SERPL-SCNC: 3.2 MMOL/L (ref 3.5–5.3)
POTASSIUM SERPL-SCNC: 3.2 MMOL/L (ref 3.5–5.3)
POTASSIUM SERPL-SCNC: 3.8 MMOL/L (ref 3.5–5.3)
PR INTERVAL: 190 MS
PR INTERVAL: 192 MS
PROT SERPL-MCNC: 6.5 G/DL (ref 6.4–8.4)
PROT UR STRIP-MCNC: NEGATIVE MG/DL
QRS AXIS: 69 DEGREES
QRS AXIS: 70 DEGREES
QRSD INTERVAL: 140 MS
QRSD INTERVAL: 150 MS
QT INTERVAL: 396 MS
QT INTERVAL: 414 MS
QTC INTERVAL: 508 MS
QTC INTERVAL: 509 MS
RBC # BLD AUTO: 2.12 MILLION/UL (ref 3.81–5.12)
RBC #/AREA URNS AUTO: ABNORMAL /HPF
RSV RNA RESP QL NAA+PROBE: NEGATIVE
SARS-COV-2 RNA RESP QL NAA+PROBE: NEGATIVE
SODIUM 24H UR-SCNC: 23 MOL/L
SODIUM SERPL-SCNC: 119 MMOL/L (ref 135–147)
SODIUM SERPL-SCNC: 120 MMOL/L (ref 135–147)
SODIUM SERPL-SCNC: 123 MMOL/L (ref 135–147)
SP GR UR STRIP.AUTO: 1.01 (ref 1–1.03)
T WAVE AXIS: -31 DEGREES
T WAVE AXIS: -50 DEGREES
T4 FREE SERPL-MCNC: 1.23 NG/DL (ref 0.76–1.46)
TIBC SERPL-MCNC: 272 UG/DL (ref 250–450)
TSH SERPL DL<=0.05 MIU/L-ACNC: 5.88 UIU/ML (ref 0.45–5.33)
UROBILINOGEN UR QL STRIP.AUTO: 0.2 E.U./DL
VENTRICULAR RATE: 91 BPM
VENTRICULAR RATE: 99 BPM
VIT B12 SERPL-MCNC: 674 PG/ML (ref 100–900)
WBC # BLD AUTO: 6.51 THOUSAND/UL (ref 4.31–10.16)
WBC #/AREA URNS AUTO: ABNORMAL /HPF

## 2022-03-28 PROCEDURE — 83540 ASSAY OF IRON: CPT | Performed by: NURSE PRACTITIONER

## 2022-03-28 PROCEDURE — 80048 BASIC METABOLIC PNL TOTAL CA: CPT | Performed by: NURSE PRACTITIONER

## 2022-03-28 PROCEDURE — 36415 COLL VENOUS BLD VENIPUNCTURE: CPT | Performed by: EMERGENCY MEDICINE

## 2022-03-28 PROCEDURE — 82436 ASSAY OF URINE CHLORIDE: CPT | Performed by: EMERGENCY MEDICINE

## 2022-03-28 PROCEDURE — 93005 ELECTROCARDIOGRAM TRACING: CPT

## 2022-03-28 PROCEDURE — 99285 EMERGENCY DEPT VISIT HI MDM: CPT

## 2022-03-28 PROCEDURE — 87505 NFCT AGENT DETECTION GI: CPT | Performed by: NURSE PRACTITIONER

## 2022-03-28 PROCEDURE — 87186 SC STD MICRODIL/AGAR DIL: CPT | Performed by: EMERGENCY MEDICINE

## 2022-03-28 PROCEDURE — 96365 THER/PROPH/DIAG IV INF INIT: CPT

## 2022-03-28 PROCEDURE — 74176 CT ABD & PELVIS W/O CONTRAST: CPT

## 2022-03-28 PROCEDURE — 82607 VITAMIN B-12: CPT | Performed by: NURSE PRACTITIONER

## 2022-03-28 PROCEDURE — 0241U HB NFCT DS VIR RESP RNA 4 TRGT: CPT | Performed by: EMERGENCY MEDICINE

## 2022-03-28 PROCEDURE — 99285 EMERGENCY DEPT VISIT HI MDM: CPT | Performed by: EMERGENCY MEDICINE

## 2022-03-28 PROCEDURE — 84443 ASSAY THYROID STIM HORMONE: CPT | Performed by: EMERGENCY MEDICINE

## 2022-03-28 PROCEDURE — 81003 URINALYSIS AUTO W/O SCOPE: CPT | Performed by: EMERGENCY MEDICINE

## 2022-03-28 PROCEDURE — 83735 ASSAY OF MAGNESIUM: CPT | Performed by: EMERGENCY MEDICINE

## 2022-03-28 PROCEDURE — 84484 ASSAY OF TROPONIN QUANT: CPT | Performed by: EMERGENCY MEDICINE

## 2022-03-28 PROCEDURE — 93010 ELECTROCARDIOGRAM REPORT: CPT | Performed by: INTERNAL MEDICINE

## 2022-03-28 PROCEDURE — 83690 ASSAY OF LIPASE: CPT | Performed by: EMERGENCY MEDICINE

## 2022-03-28 PROCEDURE — 83735 ASSAY OF MAGNESIUM: CPT | Performed by: NURSE PRACTITIONER

## 2022-03-28 PROCEDURE — C9113 INJ PANTOPRAZOLE SODIUM, VIA: HCPCS | Performed by: FAMILY MEDICINE

## 2022-03-28 PROCEDURE — 87077 CULTURE AEROBIC IDENTIFY: CPT | Performed by: EMERGENCY MEDICINE

## 2022-03-28 PROCEDURE — 99223 1ST HOSP IP/OBS HIGH 75: CPT | Performed by: NURSE PRACTITIONER

## 2022-03-28 PROCEDURE — 84439 ASSAY OF FREE THYROXINE: CPT | Performed by: EMERGENCY MEDICINE

## 2022-03-28 PROCEDURE — 82728 ASSAY OF FERRITIN: CPT | Performed by: NURSE PRACTITIONER

## 2022-03-28 PROCEDURE — 96361 HYDRATE IV INFUSION ADD-ON: CPT

## 2022-03-28 PROCEDURE — 96375 TX/PRO/DX INJ NEW DRUG ADDON: CPT

## 2022-03-28 PROCEDURE — 82746 ASSAY OF FOLIC ACID SERUM: CPT | Performed by: NURSE PRACTITIONER

## 2022-03-28 PROCEDURE — 83935 ASSAY OF URINE OSMOLALITY: CPT | Performed by: EMERGENCY MEDICINE

## 2022-03-28 PROCEDURE — 83550 IRON BINDING TEST: CPT | Performed by: NURSE PRACTITIONER

## 2022-03-28 PROCEDURE — 81001 URINALYSIS AUTO W/SCOPE: CPT | Performed by: EMERGENCY MEDICINE

## 2022-03-28 PROCEDURE — 80048 BASIC METABOLIC PNL TOTAL CA: CPT | Performed by: PHYSICIAN ASSISTANT

## 2022-03-28 PROCEDURE — 84300 ASSAY OF URINE SODIUM: CPT | Performed by: EMERGENCY MEDICINE

## 2022-03-28 PROCEDURE — 82570 ASSAY OF URINE CREATININE: CPT | Performed by: INTERNAL MEDICINE

## 2022-03-28 PROCEDURE — 99222 1ST HOSP IP/OBS MODERATE 55: CPT | Performed by: INTERNAL MEDICINE

## 2022-03-28 PROCEDURE — 80053 COMPREHEN METABOLIC PANEL: CPT | Performed by: EMERGENCY MEDICINE

## 2022-03-28 PROCEDURE — 71045 X-RAY EXAM CHEST 1 VIEW: CPT

## 2022-03-28 PROCEDURE — 83930 ASSAY OF BLOOD OSMOLALITY: CPT | Performed by: NURSE PRACTITIONER

## 2022-03-28 PROCEDURE — G1004 CDSM NDSC: HCPCS

## 2022-03-28 PROCEDURE — 87086 URINE CULTURE/COLONY COUNT: CPT | Performed by: EMERGENCY MEDICINE

## 2022-03-28 PROCEDURE — 85025 COMPLETE CBC W/AUTO DIFF WBC: CPT | Performed by: EMERGENCY MEDICINE

## 2022-03-28 PROCEDURE — 99254 IP/OBS CNSLTJ NEW/EST MOD 60: CPT | Performed by: INTERNAL MEDICINE

## 2022-03-28 RX ORDER — BUSPIRONE HYDROCHLORIDE 5 MG/1
10 TABLET ORAL DAILY
Status: DISCONTINUED | OUTPATIENT
Start: 2022-03-28 | End: 2022-03-29

## 2022-03-28 RX ORDER — DIPHENHYDRAMINE HCL 25 MG
25 TABLET ORAL EVERY 6 HOURS PRN
Status: DISCONTINUED | OUTPATIENT
Start: 2022-03-28 | End: 2022-04-05 | Stop reason: HOSPADM

## 2022-03-28 RX ORDER — CEFTRIAXONE 1 G/50ML
1000 INJECTION, SOLUTION INTRAVENOUS ONCE
Status: COMPLETED | OUTPATIENT
Start: 2022-03-28 | End: 2022-03-28

## 2022-03-28 RX ORDER — MAGNESIUM SULFATE HEPTAHYDRATE 40 MG/ML
4 INJECTION, SOLUTION INTRAVENOUS ONCE
Status: DISCONTINUED | OUTPATIENT
Start: 2022-03-28 | End: 2022-03-28

## 2022-03-28 RX ORDER — HEPARIN SODIUM 5000 [USP'U]/ML
5000 INJECTION, SOLUTION INTRAVENOUS; SUBCUTANEOUS EVERY 8 HOURS SCHEDULED
Status: DISCONTINUED | OUTPATIENT
Start: 2022-03-28 | End: 2022-04-01

## 2022-03-28 RX ORDER — MAGNESIUM SULFATE HEPTAHYDRATE 40 MG/ML
2 INJECTION, SOLUTION INTRAVENOUS ONCE
Status: COMPLETED | OUTPATIENT
Start: 2022-03-28 | End: 2022-03-28

## 2022-03-28 RX ORDER — DIPHENHYDRAMINE HYDROCHLORIDE 50 MG/ML
25 INJECTION INTRAMUSCULAR; INTRAVENOUS ONCE
Status: COMPLETED | OUTPATIENT
Start: 2022-03-28 | End: 2022-03-28

## 2022-03-28 RX ORDER — PANTOPRAZOLE SODIUM 40 MG/1
40 INJECTION, POWDER, FOR SOLUTION INTRAVENOUS
Status: DISCONTINUED | OUTPATIENT
Start: 2022-03-28 | End: 2022-03-30

## 2022-03-28 RX ORDER — ONDANSETRON 2 MG/ML
4 INJECTION INTRAMUSCULAR; INTRAVENOUS ONCE
Status: COMPLETED | OUTPATIENT
Start: 2022-03-28 | End: 2022-03-28

## 2022-03-28 RX ORDER — METOPROLOL SUCCINATE 50 MG/1
100 TABLET, EXTENDED RELEASE ORAL DAILY
Status: DISCONTINUED | OUTPATIENT
Start: 2022-03-29 | End: 2022-03-31

## 2022-03-28 RX ORDER — ONDANSETRON 2 MG/ML
4 INJECTION INTRAMUSCULAR; INTRAVENOUS EVERY 6 HOURS PRN
Status: DISCONTINUED | OUTPATIENT
Start: 2022-03-28 | End: 2022-04-05 | Stop reason: HOSPADM

## 2022-03-28 RX ORDER — SODIUM CHLORIDE 9 MG/ML
125 INJECTION, SOLUTION INTRAVENOUS CONTINUOUS
Status: DISCONTINUED | OUTPATIENT
Start: 2022-03-28 | End: 2022-03-29

## 2022-03-28 RX ORDER — ACETAMINOPHEN 325 MG/1
650 TABLET ORAL EVERY 6 HOURS PRN
Status: DISCONTINUED | OUTPATIENT
Start: 2022-03-28 | End: 2022-04-05 | Stop reason: HOSPADM

## 2022-03-28 RX ORDER — SODIUM CHLORIDE 9 MG/ML
125 INJECTION, SOLUTION INTRAVENOUS ONCE
Status: DISCONTINUED | OUTPATIENT
Start: 2022-03-28 | End: 2022-03-28

## 2022-03-28 RX ORDER — BUSPIRONE HYDROCHLORIDE 5 MG/1
10 TABLET ORAL DAILY
Status: DISCONTINUED | OUTPATIENT
Start: 2022-03-29 | End: 2022-03-28

## 2022-03-28 RX ORDER — LOSARTAN POTASSIUM 50 MG/1
100 TABLET ORAL DAILY
Status: DISCONTINUED | OUTPATIENT
Start: 2022-03-28 | End: 2022-03-29

## 2022-03-28 RX ORDER — LIDOCAINE HYDROCHLORIDE 20 MG/ML
15 SOLUTION OROPHARYNGEAL 4 TIMES DAILY PRN
Status: DISCONTINUED | OUTPATIENT
Start: 2022-03-28 | End: 2022-03-31

## 2022-03-28 RX ORDER — POTASSIUM CHLORIDE 14.9 MG/ML
20 INJECTION INTRAVENOUS ONCE
Status: COMPLETED | OUTPATIENT
Start: 2022-03-28 | End: 2022-03-28

## 2022-03-28 RX ORDER — SODIUM CHLORIDE 9 MG/ML
75 INJECTION, SOLUTION INTRAVENOUS CONTINUOUS
Status: DISCONTINUED | OUTPATIENT
Start: 2022-03-28 | End: 2022-03-28

## 2022-03-28 RX ORDER — SODIUM CHLORIDE AND POTASSIUM CHLORIDE .9; .15 G/100ML; G/100ML
125 SOLUTION INTRAVENOUS ONCE
Status: DISCONTINUED | OUTPATIENT
Start: 2022-03-28 | End: 2022-04-01

## 2022-03-28 RX ORDER — CEFTRIAXONE 1 G/50ML
1000 INJECTION, SOLUTION INTRAVENOUS EVERY 24 HOURS
Status: DISCONTINUED | OUTPATIENT
Start: 2022-03-29 | End: 2022-03-30

## 2022-03-28 RX ADMIN — CEFTRIAXONE 1000 MG: 1 INJECTION, SOLUTION INTRAVENOUS at 05:43

## 2022-03-28 RX ADMIN — SODIUM BICARBONATE 150 ML/HR: 84 INJECTION, SOLUTION INTRAVENOUS at 14:00

## 2022-03-28 RX ADMIN — DIPHENHYDRAMINE HYDROCHLORIDE 25 MG: 50 INJECTION, SOLUTION INTRAMUSCULAR; INTRAVENOUS at 10:05

## 2022-03-28 RX ADMIN — HEPARIN SODIUM 5000 UNITS: 5000 INJECTION INTRAVENOUS; SUBCUTANEOUS at 21:57

## 2022-03-28 RX ADMIN — MAGNESIUM SULFATE 2 G: 2 INJECTION INTRAVENOUS at 04:48

## 2022-03-28 RX ADMIN — POTASSIUM CHLORIDE 20 MEQ: 14.9 INJECTION, SOLUTION INTRAVENOUS at 08:19

## 2022-03-28 RX ADMIN — BUSPIRONE HYDROCHLORIDE 10 MG: 5 TABLET ORAL at 21:56

## 2022-03-28 RX ADMIN — SODIUM CHLORIDE 1000 ML: 0.9 INJECTION, SOLUTION INTRAVENOUS at 03:21

## 2022-03-28 RX ADMIN — ONDANSETRON 4 MG: 2 INJECTION INTRAMUSCULAR; INTRAVENOUS at 03:21

## 2022-03-28 RX ADMIN — PANTOPRAZOLE SODIUM 40 MG: 40 INJECTION, POWDER, LYOPHILIZED, FOR SOLUTION INTRAVENOUS at 14:00

## 2022-03-28 RX ADMIN — LOSARTAN POTASSIUM 100 MG: 50 TABLET, FILM COATED ORAL at 10:06

## 2022-03-28 RX ADMIN — MAGNESIUM SULFATE HEPTAHYDRATE 2 G: 40 INJECTION, SOLUTION INTRAVENOUS at 06:08

## 2022-03-28 NOTE — ASSESSMENT & PLAN NOTE
· Anion gap is 17 CO2 19  · In the setting of BALJINDER, GI losses  · Treat suspected underlying cause of BALJINDER  · Daily metabolic panel and trend

## 2022-03-28 NOTE — ASSESSMENT & PLAN NOTE
· Potassium 3 2  · Received 20 mEq IV in ER  · Repeat potassium 3 8  · Recheck metabolic panel and trend potassium

## 2022-03-28 NOTE — ASSESSMENT & PLAN NOTE
· Presents with generalized weakness and left upper quadrant abdominal pain in the setting of recent viral illness, history of gastric bypass in 1998  · She has not been able to keep down any oral intake for 5-6 days  · COVID negative  · Mild transaminitis-- improving  · CT abdomen pelvis: Findings as above suspicious for gastritis/duodenitis, i e  peptic ulcer disease, in the proper clinical setting  Hepatic steatosis re-demonstrated     · GI input appreciated- suspected infectious in etiology  · Stool cultures- negative; C difficile-pending

## 2022-03-28 NOTE — ASSESSMENT & PLAN NOTE
· Complains of right lateral tongue burning  · Viscous lidocaine swish and spit  · Serial assessments

## 2022-03-28 NOTE — ASSESSMENT & PLAN NOTE
· Presents with generalized weakness and abdominal pain  · Creatinine 1 73 on admission, recent baseline appears to be around 1 1 mg/dL    Suspect prerenal due to GI losses  · Received 1 L normal saline in the ER; continue with IV fluid  · Nephrology input appreciated  · Daily metabolic panel and trend creatinine  · Avoid nephrotoxins and hypotension

## 2022-03-28 NOTE — ED PROVIDER NOTES
History  Chief Complaint   Patient presents with    Abdominal Pain     left upper quadrant pain    Weakness - Generalized     53-year-old female presenting with persistent nausea vomiting diarrhea and generalized fatigue for the last 2 weeks  Patient also has some mild intermittent left-sided abdominal pain  No modifiers  Patient states symptoms began as a slight viral illness 2 weeks ago which ran through her family, however symptoms gradually worsened  She denies black or bloody stools, hemetemesis       Abdominal Pain  Pain location:  LUQ  Pain quality: dull    Pain radiates to:  Does not radiate  Pain severity:  Mild  Onset quality:  Gradual  Chronicity:  New  Relieved by:  Nothing  Worsened by:  Nothing  Associated symptoms: diarrhea, fatigue, nausea and vomiting    Associated symptoms: no chest pain, no chills, no cough, no dysuria, no fever and no sore throat        Prior to Admission Medications   Prescriptions Last Dose Informant Patient Reported?  Taking?   busPIRone (BUSPAR) 10 mg tablet   No No   Sig: Take 1 tablet (10 mg total) by mouth 2 (two) times a day   Patient taking differently: Take 10 mg by mouth in the morning     diphenhydrAMINE (Benadryl Allergy) 25 mg capsule  Self Yes No   Sig: Take 25 mg by mouth every 6 (six) hours as needed for itching     estradiol (ESTRACE) 0 1 mg/g vaginal cream   No No   Sig: Insert 1 g into the vagina 3 (three) times a week Apply a pea sized amount to external vaginal opening and urethra area twice weekly   ibuprofen (MOTRIN) 600 mg tablet   No No   Sig: Take 1 tablet (600 mg total) by mouth every 6 (six) hours as needed for moderate pain   metoprolol succinate (TOPROL-XL) 100 mg 24 hr tablet   No No   Sig: Take 1 tablet (100 mg total) by mouth daily   valsartan-hydrochlorothiazide (DIOVAN-HCT) 320-25 MG per tablet   No No   Sig: Take 1 tablet by mouth daily      Facility-Administered Medications: None       Past Medical History:   Diagnosis Date    Anxiety  Heart attack ([facility] [address] ) 2006    History of blood transfusion     History of depression     Hypertension        Past Surgical History:   Procedure Laterality Date    BREAST CYST EXCISION Right 1999  benign    BREAST SURGERY Right     calcifications      SECTION  1979, 1985    CHOLECYSTECTOMY      GASTRIC BYPASS      OOPHORECTOMY Right        Family History   Problem Relation Age of Onset    Diabetes Mother     Heart attack Father     Diabetes Sister     Substance Abuse Brother     Diabetes Brother     No Known Problems Maternal Grandmother     Breast cancer Paternal Grandmother     No Known Problems Sister     No Known Problems Sister     No Known Problems Maternal Aunt     No Known Problems Maternal Aunt     No Known Problems Maternal Aunt     No Known Problems Paternal Aunt     No Known Problems Paternal Aunt     No Known Problems Paternal Aunt      I have reviewed and agree with the history as documented  E-Cigarette/Vaping    E-Cigarette Use Never User      E-Cigarette/Vaping Substances    Nicotine No     THC No     CBD No     Flavoring No     Other No     Unknown No      Social History     Tobacco Use    Smoking status: Never Smoker    Smokeless tobacco: Never Used   Vaping Use    Vaping Use: Never used   Substance Use Topics    Alcohol use: Yes    Drug use: Not Currently       Review of Systems   Constitutional: Positive for fatigue  Negative for chills and fever  HENT: Negative for congestion, nosebleeds, rhinorrhea and sore throat  Eyes: Negative for pain and visual disturbance  Respiratory: Negative for cough and wheezing  Cardiovascular: Negative for chest pain and leg swelling  Gastrointestinal: Positive for abdominal pain, diarrhea, nausea and vomiting  Negative for abdominal distention  Genitourinary: Negative for dysuria and frequency  Musculoskeletal: Negative for back pain and joint swelling  Skin: Negative for rash and wound  Neurological: Negative for weakness and numbness  Psychiatric/Behavioral: Negative for decreased concentration and suicidal ideas  Physical Exam  Physical Exam  Vitals and nursing note reviewed  Constitutional:       Appearance: She is well-developed  She is ill-appearing  HENT:      Head: Normocephalic and atraumatic  Mouth/Throat:      Mouth: Mucous membranes are dry  Eyes:      Conjunctiva/sclera: Conjunctivae normal       Pupils: Pupils are equal, round, and reactive to light  Neck:      Trachea: No tracheal deviation  Cardiovascular:      Rate and Rhythm: Normal rate and regular rhythm  Heart sounds: Normal heart sounds  No murmur heard  Pulmonary:      Effort: Pulmonary effort is normal  No respiratory distress  Breath sounds: Normal breath sounds  No wheezing or rales  Abdominal:      General: Bowel sounds are normal  There is no distension  Palpations: Abdomen is soft  Tenderness: There is no abdominal tenderness  Musculoskeletal:         General: No deformity  Cervical back: Normal range of motion and neck supple  Skin:     General: Skin is warm and dry  Capillary Refill: Capillary refill takes less than 2 seconds  Neurological:      Mental Status: She is alert and oriented to person, place, and time  Sensory: No sensory deficit     Psychiatric:         Judgment: Judgment normal          Vital Signs  ED Triage Vitals [03/28/22 0259]   Temperature Pulse Respirations Blood Pressure SpO2   97 9 °F (36 6 °C) (!) 111 18 142/62 100 %      Temp Source Heart Rate Source Patient Position - Orthostatic VS BP Location FiO2 (%)   Temporal Monitor Lying Left arm --      Pain Score       7           Vitals:    03/28/22 0259 03/28/22 0330 03/28/22 0500 03/28/22 0515   BP: 142/62      Pulse: (!) 111 93 92 95   Patient Position - Orthostatic VS: Lying            Visual Acuity      ED Medications  Medications   potassium chloride 20 mEq IVPB (premix) (has no administration in time range)   magnesium sulfate 2 g/50 mL IVPB (premix) 2 g (2 g Intravenous New Bag 3/28/22 1372)   sodium chloride 0 9 % bolus 1,000 mL (0 mL Intravenous Stopped 3/28/22 0543)   ondansetron (ZOFRAN) injection 4 mg (4 mg Intravenous Given 3/28/22 0321)   magnesium sulfate 2 g/50 mL IVPB (premix) 2 g (0 g Intravenous Stopped 3/28/22 0543)   cefTRIAXone (ROCEPHIN) IVPB (premix in dextrose) 1,000 mg 50 mL (0 mg Intravenous Stopped 3/28/22 0609)       Diagnostic Studies  Results Reviewed     Procedure Component Value Units Date/Time    Osmolality, urine [[id number]]     Lab Status: No result Specimen: Urine     Sodium, urine, random [[id number]]     Lab Status: No result Specimen: Urine     Chloride, urine, random [[id number]]     Lab Status: No result Specimen: Urine     HS Troponin I 2hr [[id number]] Collected: 03/28/22 0549    Lab Status: In process Specimen: Blood from Arm, Right Updated: 03/28/22 0552    Urine Microscopic [[id number]]  (Abnormal) Collected: 03/28/22 0448    Lab Status: Final result Specimen: Urine, Clean Catch Updated: 03/28/22 0522     RBC, UA None Seen /hpf      WBC, UA 20-30 /hpf      Epithelial Cells Occasional /hpf      Bacteria, UA Innumerable /hpf     Urine culture [[id number]] Collected: 03/28/22 0448    Lab Status:  In process Specimen: Urine, Clean Catch Updated: 03/28/22 0522    UA w Reflex to Microscopic w Reflex to Culture [[id number]]  (Abnormal) Collected: 03/28/22 0448    Lab Status: Final result Specimen: Urine, Clean Catch Updated: 03/28/22 0457     Color, UA Yellow     Clarity, UA Clear     Specific Gravity, UA 1 010     pH, UA 6 0     Leukocytes, UA 2+     Nitrite, UA Negative     Protein, UA Negative mg/dl      Glucose, UA Negative mg/dl      Ketones, UA Negative mg/dl      Urobilinogen, UA 0 2 E U /dl      Bilirubin, UA Negative     Blood, UA Negative    COVID/FLU/RSV - 2 hour TAT [[id number]]  (Normal) Collected: 03/28/22 0324    Lab Status: Final result Specimen: Nares from Nose Updated: 03/28/22 0406     SARS-CoV-2 Negative     INFLUENZA A PCR Negative     INFLUENZA B PCR Negative     RSV PCR Negative    Narrative:      FOR PEDIATRIC PATIENTS - copy/paste COVID Guidelines URL to browser: [email or web address]    SARS-CoV-2 assay is a Nucleic Acid Amplification assay intended for the  qualitative detection of nucleic acid from SARS-CoV-2 in nasopharyngeal  swabs  Results are for the presumptive identification of SARS-CoV-2 RNA  Positive results are indicative of infection with SARS-CoV-2, the virus  causing COVID-19, but do not rule out bacterial infection or co-infection  with other viruses  Laboratories within the [place] and its  territories are required to report all positive results to the appropriate  public health authorities  Negative results do not preclude SARS-CoV-2  infection and should not be used as the sole basis for treatment or other  patient management decisions  Negative results must be combined with  clinical observations, patient history, and epidemiological information  This test has not been FDA cleared or approved  This test has been authorized by FDA under an Emergency Use Authorization  (EUA)  This test is only authorized for the duration of time the  declaration that circumstances exist justifying the authorization of the  emergency use of an in vitro diagnostic tests for detection of SARS-CoV-2  virus and/or diagnosis of COVID-19 infection under section 564(b)(1) of  the Act, 21 U  S C  261DCB-0(W)(2), unless the authorization is terminated  or revoked sooner  The test has been validated but independent review by FDA  and CLIA is pending  Test performed using [employer] GeneXpert: This RT-PCR assay targets N2,  a region unique to SARS-CoV-2  A conserved region in the E-gene was chosen  for pan-Sarbecovirus detection which includes SARS-CoV-2      HS Troponin I 4hr [[id number]]     Lab Status: No result Specimen: Blood     TSH, 3rd generation with Free T4 reflex [[id number]]  (Abnormal) Collected: 03/28/22 0324    Lab Status: Final result Specimen: Blood from Arm, Right Updated: 03/28/22 0401     TSH 3RD GENERATON 5 879 uIU/mL     Narrative:      Patients undergoing fluorescein dye angiography may retain small amounts of fluorescein in the body for 48-72 hours post procedure  Samples containing fluorescein can produce falsely depressed TSH values  If the patient had this procedure,a specimen should be resubmitted post fluorescein clearance  T4, free [[id number]] Collected: 03/28/22 0324    Lab Status:  In process Specimen: Blood from Arm, Right Updated: 03/28/22 0401    HS Troponin 0hr (reflex protocol) [[id number]]  (Normal) Collected: 03/28/22 0324    Lab Status: Final result Specimen: Blood from Arm, Right Updated: 03/28/22 0353     hs TnI 0hr 8 ng/L     Comprehensive metabolic panel [[id number]]  (Abnormal) Collected: 03/28/22 0324    Lab Status: Final result Specimen: Blood from Arm, Right Updated: 03/28/22 0349     Sodium 120 mmol/L      Potassium 3 2 mmol/L      Chloride 84 mmol/L      CO2 19 mmol/L      ANION GAP 17 mmol/L      BUN 22 mg/dL      Creatinine 1 73 mg/dL      Glucose 106 mg/dL      Calcium 8 8 mg/dL      AST 55 U/L      ALT 31 U/L      Alkaline Phosphatase 157 U/L      Total Protein 6 5 g/dL      Albumin 3 9 g/dL      Total Bilirubin 1 36 mg/dL      eGFR 31 ml/min/1 73sq m     Narrative:      [facility] guidelines for Chronic Kid[facility] Disease (CKD):     Stage 1 with normal or high GFR (GFR > 90 mL/min/1 73 square meters)    Stage 2 Mild CKD (GFR = 60-89 mL/min/1 73 square meters)    Stage 3A Moderate CKD (GFR = 45-59 mL/min/1 73 square meters)    Stage 3B Moderate CKD (GFR = 30-44 mL/min/1 73 square meters)    Stage 4 Severe CKD (GFR = 15-29 mL/min/1 73 square meters)    Stage 5 End Stage CKD (GFR <15 mL/min/1 73 square meters)  Note: GFR calculation is accurate only with a steady state creatinine    Magnesium [[id number]]  (Abnormal) Collected: 03/28/22 0324    Lab Status: Final result Specimen: Blood from Arm, Right Updated: 03/28/22 0349     Magnesium 1 0 mg/dL     Lipase [[id number]]  (Abnormal) Collected: 03/28/22 0324    Lab Status: Final result Specimen: Blood from Arm, Right Updated: 03/28/22 0349     Lipase 145 u/L     CBC and differential [[id number]]  (Abnormal) Collected: 03/28/22 0324    Lab Status: Final result Specimen: Blood from Arm, Right Updated: 03/28/22 0330     WBC 6 51 Thousand/uL      RBC 2 12 Million/uL      Hemoglobin 8 6 g/dL      Hematocrit 23 5 %       fL      MCH 40 6 pg      MCHC 36 6 g/dL      RDW 12 9 %      MPV 9 0 fL      Platelets 505 Thousands/uL      nRBC 0 /100 WBCs      Neutrophils Relative 60 %      Immat GRANS % 1 %      Lymphocytes Relative 25 %      Monocytes Relative 10 %      Eosinophils Relative 3 %      Basophils Relative 1 %      Neutrophils Absolute 3 99 Thousands/µL      Immature Grans Absolute 0 03 Thousand/uL      Lymphocytes Absolute 1 64 Thousands/µL      Monocytes Absolute 0 63 Thousand/µL      Eosinophils Absolute 0 19 Thousand/µL      Basophils Absolute 0 03 Thousands/µL                  CT abdomen pelvis wo contrast   Final Result by Alize Jeong MD (03/28 0502)      Limited secondary to lack of enteric and IV contrast   Findings as above suspicious for gastritis/duodenitis, i e  peptic ulcer disease, in the proper clinical setting  Correlate clinically and consider follow-up GI consultation  No free air  Redemonstrated diffuse hepatic steatosis        Workstation performed: [id number]         XR chest 1 view portable    (Results Pending)              Procedures  ECG 12 Lead Documentation Only    Date/Time: 3/28/2022 4:55 AM  Performed by: Leatha Tobias DO  Authorized by: Leatha Tobias DO     Indications / Diagnosis:  Weak  Interpretation: Interpretation: abnormal    Rate:     ECG rate:  91    ECG rate assessment: normal    Rhythm:     Rhythm: sinus rhythm    Conduction:     Conduction: abnormal      Abnormal conduction: complete LBBB    ST segments:     ST segments:  Non-specific             ED Course  ED Course as of 03/28/22 0614   Mon Mar 28, 2022   0856 UTI, will treat, asymptomatic  Will get cxr to investigate for mass/pna as cause, but labs consistent with GI losses and decreased intake  SBIRT 20yo+      Most Recent Value   SBIRT (24 yo +)    In order to provide better care to our patients, we are screening all of our patients for alcohol and drug use  Would it be okay to ask you these screening questions? Yes Filed at: 03/28/2022 0356   Initial Alcohol Screen: US AUDIT-C     1  How often do you have a drink containing alcohol? 0 Filed at: 03/28/2022 0356   2  How many drinks containing alcohol do you have on a typical day you are drinking? 0 Filed at: 03/28/2022 0356   3b  FEMALE Any Age, or MALE 65+: How often do you have 4 or more drinks on one occassion? 0 Filed at: 03/28/2022 0356   Audit-C Score 0 Filed at: 03/28/2022 5084   AIYANA: How many times in the past year have you    Used an illegal drug or used a prescription medication for non-medical reasons? Never Filed at: 03/28/2022 0356                    MDM  Number of Diagnoses or Management Options  Hyponatremia: new and requires workup  Diagnosis management comments: Patient presents with multiple generalized complaints including generalized weakness,  fatigue, as well as nausea vomiting and diarrhea    Broad differential, however patient does appear dry, in generally lethargic however not in any acute distress    Labs are significant for hyponatremia and other electrolyte abnormalities for which she will require admission    Will get CT as this appears to be related to nausea vomiting diarrhea, although initial suspicion is low for any acute abdominal pathology  She does have a history of gastric bypass approximately 25 years ago, however is unable to tolerate p o  contrast and has not tolerated IV contrast in the recent past        Amount and/or Complexity of Data Reviewed  Review and summarize past medical records: yes  Independent visualization of images, tracings, or specimens: yes    Risk of Complications, Morbidity, and/or Mortality  Presenting problems: high  Diagnostic procedures: minimal  Management options: high        Disposition  Final diagnoses:   Hyponatremia   UTI (urinary tract infection)   Nausea   Fatigue     Time reflects when diagnosis was documented in both MDM as applicable and the Disposition within this note     Time User Action Codes Description Comment    3/28/2022  4:11 AM Malva [place] Add [E87 1] Hyponatremia     3/28/2022  5:51 AM Malva [place] Add [N39 0] UTI (urinary tract infection)     3/28/2022  5:51 AM Malva [place] Add [R11 0] Nausea     3/28/2022  5:51 AM Malva [place] Add [R53 83] Fatigue       ED Disposition     ED Disposition Condition Date/Time Comment    Admit Stable Mon Mar 28, 2022  4:11 AM Case was discussed with Sil and the patient's admission status was agreed to be Admission Status: inpatient status to the service of Dr Lisseth Ashraf   Follow-up Information    None         Patient's Medications   Discharge Prescriptions    No medications on file       No discharge procedures on file      PDMP Review     None          ED Provider  Electronically Signed by           Evgeny Amador DO  03/28/22 0978

## 2022-03-28 NOTE — ASSESSMENT & PLAN NOTE
· Sodium 120  · Received 1 L normal saline in ER  · Repeat sodium 119  · Could be due to thiazide diuretic verses GI losses  · Fluid restriction 1200 mL  · Nephrology consult  · Urine osmolality, urine sodium pending, check serum osmolality  · TSH 5 879

## 2022-03-28 NOTE — ASSESSMENT & PLAN NOTE
· BP reviewed  · Continue home medication including metoprolol and Diovan which is formulary substituted ---  · Monitor blood pressure

## 2022-03-28 NOTE — ASSESSMENT & PLAN NOTE
· Presents with generalized weakness and abdominal pain  · Creatinine 1 73, recent baseline appears to be around 1 1    Suspect prerenal due to GI losses  · Received 1 L normal saline in the ER  · Further fluids restricted due to persistent hyponatremia in the setting of thiazide use pending nephrology consult  · Daily metabolic panel and trend creatinine  · Avoid nephrotoxins and hypotension

## 2022-03-28 NOTE — CONSULTS
Consultation - Nephrology   Jeremiah Graham 63 y o  female MRN: [id number]  Unit/Bed#: ED 21 Encounter: [id number]      A/P:  1  Acute kidney injury on top of chronic kidney disease   Etiology most likely due to prerenal causes due to severely reduced oral intake of food and fluids over last 3 weeks  Patient's urine sodium and chloride are actively being processed, will add a urine creatinine  Continue avoid potential nephrotoxins and continue to provide volume expansion  Patient will initially be given 1 L of sodium bicarbonate at 150 mL/hour, followed by normal saline with 20 mEq per L of potassium chloride at 125 mL/hour  2  Chronic kidney disease stage 3 with baseline creatinine around 1 2 mg/dL  3  Hyponatremia   Most likely due to hypovolemia, continue volume expansion as mentioned above  Patient's serum sodium is essentially unchanged over last 6 hours or so  Would continue monitor every 8 hours for now  Ultimately, the patient's physiology may transition as the hydrochlorothiazide effect on the kidneys goes away which will prompt greater removal free water  Keep an eye on the patient's urine output especially when he transitions to a lighter color  Urine osmolality at this time is pending  4  Acidosis   Will provide the patient 1 L of 150 mEq of IV sodium bicarbonate  If the patient's GI losses continue, we may need to provide additional but the moment this was simply served to stabilize the patient is acid-base disorder  Continue monitor at this time  5  Hypokalemia   Patient given 1 time dose of 20 mEq of IV potassium chloride, will add 20 mEq/L of potassium chloride with normal saline which will be infused at 125 mL/hour  Continue closely monitor potassium, magnesium level was appropriate status post supplementation  6  Hypomagnesemia   Status post supplementation, continue monitor and add additional supplement as indicated    Given the patient's history of gastric bypass, we should try to provide IV supplementation especially until her loose bowel movements improve  7  Acute cystitis   Continue antibiotics according to hospitalist, follow-up urine culture  Thank you for allowing us to participate in the care of your patient  Please feel free to contact us regarding the care of this patient, or any other questions/concerns that may be applicable  Patient Active Problem List   Diagnosis    Class 1 obesity due to excess calories without serious comorbidity with body mass index (BMI) of 33 0 to 33 9 in adult    Essential hypertension    Anxiety and depression    Pap smear for cervical cancer screening    Postmenopausal bleeding    Vaginal atrophy    At risk for loss of bone density    Dyspareunia, female    S/P gastric bypass    History of myocardial infarction    Arthritis of lumbar spine    Acute kidney injury ([facility] [address] )    Hyponatremia    Hypokalemia    Hypomagnesemia    High anion gap metabolic acidosis    Gastroenteritis    Painful tongue    Acute cystitis without hematuria    Macrocytic anemia       History of Present Illness   Physician Requesting Consult: Rebecca Eisenberg, *  Reason for Consult / Principal Problem:  Acute kidney injury/hyponatremia   Hx and PE limited by:   HPI: Gerber Chew is a 63y o  year old female who presented to the emergency department earlier this morning due to generalized weakness associated with abdominal pain  Patient claims that she has had this issue for the last 3 weeks, which initially began as a gastrointestinal illness in her home but has not improved  She claims that she did not have diarrhea until this morning when she presented hospital   She claims the diarrhea was loose and watery with no blood or mucus  Patient has not contacted her primary care provider or any other physician with respect to her symptoms over last 3 weeks  Patient has a history of gastric bypass in 1998       From the renal standpoint, patient is an acute kidney injury with a potential baseline creatinine around 1 2 mg/dL, now down to 1 56 mg/dL after presenting with a creatinine 1 73 mg/dL  Patient's serum sodium has remained essentially unchanged presenting at 120 millimole/L and this morning it is 119 millimole/L  Patient's serum bicarbonate is standing unchanged at 19 millimole/L  Overall, the patient has had poor oral intake over last 3 weeks  Patient has taken a few doses of her Motrin, denies other over-the-counter NSAIDs  Patient is also on valsartan/hydrochlorothiazide in the outpatient setting  Electronic medical records reviewed during the course this consultation, the patient has had a few emergency room visits over last 12 months including back pain as well as chest pain  History obtained from chart review and the patient    Constitutional ROS- Denies fatigue, fever, chills, night sweats, weight changes  HEENT ROS- Denies history of eye surgeries, glaucoma, headaches or history of trauma, blurred vision     Endocrine ROS- No history diabetes mellitus or thyroid disease  Cardiovascular ROS- Denies chest pain, palpitation, dyspnea exertion, orthopnea, claudication  Pulmonary ROS- Denies history of COPD, asthma  Denies cough, hemoptysis, shortness of breath  GI ROS- Denies abdominal pain, diarrhea, nausea, swallowing problems, vomiting, constipation, blood in stools, fecal incontinence  Hematological ROS- Denies history of easy bruising, blood clots, bleeding or blood transfusions  Genitourinary ROS- Denies recent hematuria, pyuria, flank pain, change in urinary stream, decreased urinary output, increased urinary frequency, nocturia, foamy urine, or urinary incontinence  Lymphatic ROS- Denies lymphadenopathy  Musculoskeletal ROS- Denies history of muscle weakness, joint pain  Dermatological ROS- Denies rash, wounds, ulcers, itching, jaundice    Psychiatric ROS- Denies anxiety, depression, hallucinations, disorientation  Neurological ROS- No stroke or TIA symptoms        Historical Information   Past Medical History:   Diagnosis Date    Anxiety     Heart attack ([address] ) 2006    History of blood transfusion     History of depression     Hypertension      Past Surgical History:   Procedure Laterality Date    BREAST CYST EXCISION Right 1999  benign    BREAST SURGERY Right     calcifications      SECTION  ,     CHOLECYSTECTOMY      GASTRIC BYPASS      OOPHORECTOMY Right      Social History   Social History     Substance and Sexual Activity   Alcohol Use Yes     Social History     Substance and Sexual Activity   Drug Use Not Currently     Social History     Tobacco Use   Smoking Status Never Smoker   Smokeless Tobacco Never Used     Family History   Problem Relation Age of Onset    Diabetes Mother     Heart attack Father     Diabetes Sister     Substance Abuse Brother     Diabetes Brother     No Known Problems Maternal Grandmother     Breast cancer Paternal Grandmother     No Known Problems Sister     No Known Problems Sister     No Known Problems Maternal Aunt     No Known Problems Maternal Aunt     No Known Problems Maternal Aunt     No Known Problems Paternal Aunt     No Known Problems Paternal Aunt     No Known Problems Paternal Aunt        Meds/Allergies   all current active meds have been reviewed, current meds:   Current Facility-Administered Medications   Medication Dose Route Frequency    [START ON 3/29/2022] cefTRIAXone (ROCEPHIN) IVPB (premix in dextrose) 1,000 mg 50 mL  1,000 mg Intravenous Q24H    losartan (COZAAR) tablet 100 mg  100 mg Oral Daily    and PTA meds:  (Not in a hospital admission)        Allergies   Allergen Reactions    Oxycodone-Acetaminophen Hives     per t-system      Propoxyphene Hives    Shellfish Allergy - Food Allergy        Objective     Intake/Output Summary (Last 24 hours) at 3/28/2022 1212  Last data filed at 3/28/2022 1019  Gross per 24 hour   Intake 1189 16 ml   Output --   Net 1189 16 ml       Invasive Devices:        Physical Exam      I/O last 3 completed shifts: In: 1089 2 [IV Piggyback:1089 2]  Out: -     Vitals:    03/28/22 0645   BP:    Pulse: 95   Resp:    Temp:    SpO2: 99%       General Appearance:    No acute distress  Cooperative  Appears stated age  Head:    Normocephalic  Atraumatic  Normal jaw occlusion  Eyes:    Lids, conjunctiva normal  No scleral icterus  Ears:    Normal external ears  Nose:   Nares normal  No drainage  Mouth:   Lips, tongue normal  Mucosa normal  Phonation normal    Neck:   Supple  Symmetrical    Back:     Symmetric  No CVA tenderness  Lungs:     Normal respiratory effort  Clear to auscultation bilaterally  Chest wall:    No tenderness or deformity  Heart:    Regular rate and rhythm  Normal S1 and S2  No murmur  No JVD  No edema  Abdomen:     Soft  Non-tender  Bowel sounds active  Genitourinary:   No Holly catheter present  Extremities:   Extremities normal  Atraumatic  No cyanosis  Skin:   Warm and dry  No pallor, jaundice, rash, ecchymoses  Neurologic:   Alert and oriented to person, place, time  No focal deficit  Current Weight: Weight - Scale: 79 4 kg (175 lb)  First Weight: Weight - Scale: 79 4 kg (175 lb)    Lab Results:  I have personally reviewed pertinent labs      CBC:   Lab Results   Component Value Date    WBC 6 51 03/28/2022    HGB 8 6 (L) 03/28/2022    HCT 23 5 (L) 03/28/2022     (H) 03/28/2022     03/28/2022    MCH 40 6 (H) 03/28/2022    MCHC 36 6 03/28/2022    RDW 12 9 03/28/2022    MPV 9 0 03/28/2022    NRBC 0 03/28/2022     CMP:   Lab Results   Component Value Date    K 3 8 03/28/2022    CL 87 (L) 03/28/2022    CO2 19 (L) 03/28/2022    BUN 19 03/28/2022    CREATININE 1 56 (H) 03/28/2022    CALCIUM 7 9 (L) 03/28/2022    AST 55 (H) 03/28/2022    ALT 31 03/28/2022    ALKPHOS 157 (H) 03/28/2022    EGFR 36 03/28/2022     Phosphorus: No results found for: PHOS  Magnesium:   Lab Results   Component Value Date    MG 2 3 03/28/2022     Urinalysis:   Lab Results   Component Value Date    COLORU Yellow 03/28/2022    CLARITYU Clear 03/28/2022    SPECGRAV 1 010 03/28/2022    PHUR 6 0 03/28/2022    LEUKOCYTESUR 2+ (A) 03/28/2022    NITRITE Negative 03/28/2022    GLUCOSEU Negative 03/28/2022    KETONESU Negative 03/28/2022    BILIRUBINUR Negative 03/28/2022    BLOODU Negative 03/28/2022     Ionized Calcium: No results found for: CAION  Coagulation: No results found for: PT, INR, APTT  Troponin: No results found for: TROPONINI  ABG: No results found for: PHART, JPH0SIX, PO2ART, OMT6TSZ, B7EBOXLP, BEART, SOURCE    Results from last 7 days   Lab Units 03/28/22  0906 03/28/22  0324   POTASSIUM mmol/L 3 8 3 2*   CHLORIDE mmol/L 87* 84*   CO2 mmol/L 19* 19*   BUN mg/dL 19 22   CREATININE mg/dL 1 56* 1 73*   CALCIUM mg/dL 7 9* 8 8   ALK PHOS U/L  --  157*   ALT U/L  --  31   AST U/L  --  55*       Radiology review:  Procedure: CT abdomen pelvis wo contrast    Result Date: 3/28/2022  Narrative: CT ABDOMEN AND PELVIS WITHOUT IV CONTRAST INDICATION:   LLQ abdominal pain LUQ abdominal pain 2 weeks persistnet nausea/v/diarrhea, left sided pain  Unable to tolerate PO contrast, hx hives to IV contrast  COMPARISON:  2/2/2022  TECHNIQUE:  CT examination of the abdomen and pelvis was performed without intravenous contrast   Axial, sagittal, and coronal 2D reformatted images were created from the source data and submitted for interpretation  Radiation dose length product (DLP) for this visit:  557 18 mGy-cm   This examination, like all CT scans performed in the [facility], was performed utilizing techniques to minimize radiation dose exposure, including the use of iterative  reconstruction and automated exposure control  Enteric contrast was not administered   FINDINGS: ABDOMEN LOWER CHEST:  No clinically significant abnormality identified in the visualized lower chest  LIVER/BILIARY TREE:  Liver is diffusely decreased in density consistent with fatty change  No CT evidence of suspicious hepatic mass  Normal hepatic contours  No biliary dilatation  GALLBLADDER:  Gallbladder is surgically absent  SPLEEN:  Unremarkable  PANCREAS:  Unremarkable  ADRENAL GLANDS:  Unremarkable  KIDNEYS/URETERS:  Unremarkable  No hydronephrosis  STOMACH AND BOWEL:  Postsurgical changes of prior gastric bypass again noted  No bowel obstruction  Some mural fatty infiltration is noted within right colonic loops likely sequela of remote inflammatory process  There is mild wall thickening at the distal stomach/antrum and proximal duodenum with adjacent edematous/inflammatory stranding  APPENDIX:  No findings to suggest appendicitis  ABDOMINOPELVIC CAVITY:  No ascites  No pneumoperitoneum  No lymphadenopathy  VESSELS:  Unremarkable for patient's age  PELVIS REPRODUCTIVE ORGANS:  Unremarkable for patient's age  URINARY BLADDER:  Circumferential wall thickening likely on the basis of underdistention  ABDOMINAL WALL/INGUINAL REGIONS:  Unremarkable  OSSEOUS STRUCTURES:  No acute fracture or destructive osseous lesion  Redemonstrated compression deformity superior endplate of S00  Impression: Limited secondary to lack of enteric and IV contrast   Findings as above suspicious for gastritis/duodenitis, i e  peptic ulcer disease, in the proper clinical setting  Correlate clinically and consider follow-up GI consultation  No free air  Redemonstrated diffuse hepatic steatosis  Workstation performed: Zane Johnson DO      This consultation note was produced in part using a dictation device which may document imprecise wording from author's original intent

## 2022-03-28 NOTE — ASSESSMENT & PLAN NOTE
· Anion gap is 17 CO2 19  · In the setting of BALJINDER, GI losses  · This has resolved with IV fluid

## 2022-03-28 NOTE — CONSULTS
Consultation - 126 [facility]  Reyna Chyna 63 y o  female MRN: [id number]  Unit/Bed#: ED 21 Encounter: [id number]        Inpatient consult to gastroenterology  Consult performed by: Janelle Bardales PA-C  Consult ordered by: Ajit Mejia          Reason for Consult / Principal Problem:     Gastroenteritis      ASSESSMENT AND PLAN:    Patient is a 63 y o  female with PMH significant for anxiety, depression, HTN, previous MI (2006), and s/p gastric bypass (1998) who presents to the ED on 03/28 with food aversion x2-3 weeks with new onset abdominal pain, generalized weakness, nausea/vomiting, and diarrhea x24 hrs  Patient found to have significant lyte abnormalities and BALJINDER in the setting of UTI, and was admitted to Medicine  CTA/P limited secondary to lack of enteric and IV contrast, but suspicious for gastritis/duodenitis, such as peptic ulcer disease in the proper clinical setting the; correlate clinically and consider follow-up GI consultation; no free air; redemonstrated diffuse hepatic steatosis  Labs notable for macrocytic anemia (hgb 8 6), significant hyponatremia (119), corrected hypokalemia, elevated Cr (1 73- now 1 56), mild transaminitis/hyperbilirubinemia (AST 55, ALT 31, alk-phos 157, T bili 1 36), corrected hypomagnesemia, elevated lipase (145), mildly elevated TSH (5 879)  Patient remains hemodynamically stable and afebrile  Clinical picture most consistent with acute viral gastroenteritis  Stool studies including stool enteric bacterial panel and C  Diff collected and pending to r/o infectious etiology  Recommend to continue supportive care including clear liquid diet, IV fluids and lyte replacement per SLIM, IV PPI once daily, and antiemetics/analgeics as- needed  In regards to patient's anemia, this appears to be chronic s/p gastric bypass without evidence of overt GI bleeding   Given anemia is macrocytic, agree with checking iron panel, Vitamin B12 level, and folate level  Patient would benefit from endoscopic evaluation, particularly for CRC screening purposes, but can defer this to outpatient setting  Recommend to monitor/trend CBC, monitor stools for overt signs of GI bleeding, and transfuse as-needed  - Clear liquid diet; Advance as tolerated   - Continue IV fluids; Lyte replacement per SLIM  - Continue antiemetics/analgeics PRN  - IV pantoprazole 40 mg once daily (ordered)  - Review iron panel, Vitamin B12, and folate level once available  - Defer endoscopic evaluation at this time  - Monitor/trend CBC, CMP; Tansfuse PRN   - Monitor stools for overt signs of GI bleeding    GI will continue to follow  ______________________________________________________________________    HPI: Patient is a 63 y o  female with PMH significant for anxiety, depression, HTN, previous MI (2006), and s/p gastric bypass (1998) who presents to the ED on 03/28 with abdominal pain and generalized weakness  Describes her abdominal pain as intermittent and sharp/stabbing, does not radiate, and is located in the left upper and left lower quadrant  Patient also endorses increased food aversion within the last 2 weeks  States she has been gagging at the smell of food, unable to keep solids down, and can only tolerate liquids - States she has not even tried to eat solid foods, just chicken broth  Also admits to new onset diarrhea, starting yesterday  Described her diarrhea as 6-7 episodes of non-bloody, watery stools  Patient states that her family had a viral gastroenteritis,  one week prior and granddaughter/grandson yesterday  Denies ingesting poorly prepared/undercooked food products, recent travel outside the country, or recent abx use  Of note, patient s/p gastric bypass 1998 without follow-up since 2000  Patient has a longstanding hx of iron deficiency anemia, previously requiring transfusion (2004)   Per chart review, baseline hgb typically 10 without hypotension and hypokalemia  Taking OTC Vitamin D, but denies taking Vitamin B12 or iron supplements  Previous EGD/colon following gastric bypass (), reportedly unremarkable but unavailable for review  REVIEW OF SYSTEMS:    CONSTITUTIONAL: Denies any fever, chills, rigors, and weight loss  HEENT: No earache or tinnitus  Denies hearing loss or visual disturbances  CARDIOVASCULAR: No chest pain or palpitations  RESPIRATORY: Denies any cough, hemoptysis, shortness of breath or dyspnea on exertion  GASTROINTESTINAL: As noted in the History of Present Illness  GENITOURINARY: No problems with urination  Denies any hematuria or dysuria  NEUROLOGIC: No dizziness or vertigo, denies headaches  MUSCULOSKELETAL: Denies any muscle or joint pain  SKIN: Denies skin rashes or itching  ENDOCRINE: Denies excessive thirst  Denies intolerance to heat or cold  PSYCHOSOCIAL: Denies depression or anxiety  Denies any recent memory loss         Historical Information   Past Medical History:   Diagnosis Date    Anxiety     [facility] 2006    History of blood transfusion 1999    History of depression     Hypertension      Past Surgical History:   Procedure Laterality Date    BREAST CYST EXCISION Right 1999  benign    BREAST SURGERY Right 1999    calcifications      SECTION  ,     CHOLECYSTECTOMY      GASTRIC BYPASS      OOPHORECTOMY Right      Social History   Social History     Substance and Sexual Activity   Alcohol Use Yes     Social History     Substance and Sexual Activity   Drug Use Not Currently     Social History     Tobacco Use   Smoking Status Never Smoker   Smokeless Tobacco Never Used     Family History   Problem Relation Age of Onset    Diabetes Mother     Heart attack Father     Diabetes Sister     Substance Abuse Brother     Diabetes Brother     No Known Problems Maternal Grandmother     Breast cancer Paternal Grandmother     No Known Problems Sister     No Known Problems Sister     No Known Problems Maternal Aunt     No Known Problems Maternal Aunt     No Known Problems Maternal Aunt     No Known Problems Paternal Aunt     No Known Problems Paternal Aunt     No Known Problems Paternal Aunt        Meds/Allergies     (Not in a hospital admission)    Current Facility-Administered Medications   Medication Dose Route Frequency    [START ON 3/29/2022] cefTRIAXone (ROCEPHIN) IVPB (premix in dextrose) 1,000 mg 50 mL  1,000 mg Intravenous Q24H    losartan (COZAAR) tablet 100 mg  100 mg Oral Daily       Allergies   Allergen Reactions    Oxycodone-Acetaminophen Hives     per t-system      Propoxyphene Hives    Shellfish Allergy - Food Allergy            Objective     Blood pressure 142/62, pulse 95, temperature 97 9 °F (36 6 °C), temperature source Temporal, resp  rate 18, height 5' 1" (1 549 m), weight 79 4 kg (175 lb), SpO2 99 %  Body mass index is 33 07 kg/m²  Intake/Output Summary (Last 24 hours) at 3/28/2022 1054  Last data filed at 3/28/2022 9070  Gross per 24 hour   Intake 1089 16 ml   Output --   Net 1089 16 ml         PHYSICAL EXAM:      General Appearance:   Alert, cooperative, no distress   HEENT:   Normocephalic, atraumatic, anicteric  Neck:  Supple, symmetrical, trachea midline   Lungs:   Clear to auscultation bilaterally; no rales, rhonchi or wheezing; respirations unlabored    Heart[de-identified]   Regular rate and rhythm; no murmur, rub, or gallop     Abdomen:   Soft, +mild LLQ and LUQ abd TTP, non-distended; normal bowel sounds; no masses, no organomegaly    Genitalia:   Deferred    Rectal:   Deferred    Extremities:  No cyanosis, clubbing or edema    Pulses:  2+ and symmetric all extremities    Skin:  No jaundice, rashes, or lesions    Lymph nodes:  No palpable cervical lymphadenopathy        Lab Results:   Admission on 03/28/2022   Component Date Value    WBC 03/28/2022 6 51     RBC 03/28/2022 2 12*    Hemoglobin 03/28/2022 8 6*    Hematocrit 03/28/2022 23 5*    MCV 03/28/2022 111*    MCH 03/28/2022 40 6*    MCHC 03/28/2022 36 6     RDW 03/28/2022 12 9     MPV 03/28/2022 9 0     Platelets 14/66/6427 289     nRBC 03/28/2022 0     Neutrophils Relative 03/28/2022 60     Immat GRANS % 03/28/2022 1     Lymphocytes Relative 03/28/2022 25     Monocytes Relative 03/28/2022 10     Eosinophils Relative 03/28/2022 3     Basophils Relative 03/28/2022 1     Neutrophils Absolute 03/28/2022 3 99     Immature Grans Absolute 03/28/2022 0 03     Lymphocytes Absolute 03/28/2022 1 64     Monocytes Absolute 03/28/2022 0 63     Eosinophils Absolute 03/28/2022 0 19     Basophils Absolute 03/28/2022 0 03     Sodium 03/28/2022 120*    Potassium 03/28/2022 3 2*    Chloride 03/28/2022 84*    CO2 03/28/2022 19*    ANION GAP 03/28/2022 17*    BUN 03/28/2022 22     Creatinine 03/28/2022 1 73*    Glucose 03/28/2022 106     Calcium 03/28/2022 8 8     AST 03/28/2022 55*    ALT 03/28/2022 31     Alkaline Phosphatase 03/28/2022 157*    Total Protein 03/28/2022 6 5     Albumin 03/28/2022 3 9     Total Bilirubin 03/28/2022 1 36*    eGFR 03/28/2022 31     Magnesium 03/28/2022 1 0*    Lipase 03/28/2022 145*    hs TnI 0hr 03/28/2022 8     SARS-CoV-2 03/28/2022 Negative     INFLUENZA A PCR 03/28/2022 Negative     INFLUENZA B PCR 03/28/2022 Negative     RSV PCR 03/28/2022 Negative     TSH 3RD GENERATON 03/28/2022 5 879*    Color, UA 03/28/2022 Yellow     Clarity, UA 03/28/2022 Clear     Specific Gravity, UA 03/28/2022 1 010     pH, UA 03/28/2022 6 0     Leukocytes, UA 03/28/2022 2+*    Nitrite, UA 03/28/2022 Negative     Protein, UA 03/28/2022 Negative     Glucose, UA 03/28/2022 Negative     Ketones, UA 03/28/2022 Negative     Urobilinogen, UA 03/28/2022 0 2     Bilirubin, UA 03/28/2022 Negative     Blood, UA 03/28/2022 Negative     hs TnI 2hr 03/28/2022 7     Delta 2hr hsTnI 03/28/2022 -1     Free T4 03/28/2022 1 23     hs TnI 4hr 03/28/2022 7     Cielo Duff 4hr hsTnI 03/28/2022 -1     RBC, UA 03/28/2022 None Seen     WBC, UA 03/28/2022 20-30*    Epithelial Cells 03/28/2022 Occasional     Bacteria, UA 03/28/2022 Innumerable*    Sodium 03/28/2022 119*    Potassium 03/28/2022 3 8     Chloride 03/28/2022 87*    CO2 03/28/2022 19*    ANION GAP 03/28/2022 13     BUN 03/28/2022 19     Creatinine 03/28/2022 1 56*    Glucose 03/28/2022 127     Calcium 03/28/2022 7 9*    eGFR 03/28/2022 36     Ventricular Rate 03/28/2022 91     Atrial Rate 03/28/2022 91     AZ Interval 03/28/2022 192     QRSD Interval 03/28/2022 150     QT Interval 03/28/2022 414     QTC Interval 03/28/2022 509     P Axis 03/28/2022 55     QRS Axis 03/28/2022 70     T Wave Axis 03/28/2022 -50     Ventricular Rate 03/28/2022 99     Atrial Rate 03/28/2022 99     AZ Interval 03/28/2022 190     QRSD Interval 03/28/2022 140     QT Interval 03/28/2022 396     QTC Interval 03/28/2022 508     P Axis 03/28/2022 52     QRS Axis 03/28/2022 69     T Wave Axis 03/28/2022 -31     Magnesium 03/28/2022 2 3        Imaging Studies: I have personally reviewed pertinent imaging studies

## 2022-03-28 NOTE — ASSESSMENT & PLAN NOTE
· Urinalysis positive for leukocyte esterase, leukocytes, and bacteria  · Treated with ceftriaxone in ER-continue 1 g IV every 24 hours  · Urine culture is pending

## 2022-03-28 NOTE — ASSESSMENT & PLAN NOTE
· Presents with generalized weakness and left upper quadrant abdominal pain in the setting of recent viral illness, history of gastric bypass in 1998  · She has not been able to keep down any oral intake for 5-6 days  · COVID negative  · Lipase 145  · Bilirubin 1 36  · AST 55  · Alk phos 157  · CT abdomen pelvis: Findings as above suspicious for gastritis/duodenitis, i e  peptic ulcer disease, in the proper clinical setting  Hepatic steatosis re-demonstrated     · GI consult

## 2022-03-28 NOTE — ASSESSMENT & PLAN NOTE
· Sodium 120  · Suspected hypovolemia hyponatremia with concurrent use of thiazide diuresis with GI losses    · Nephrology input appreciated-continue with IV fluid for volume expansion  · TSH 5 879  · Monitor sodium level closely

## 2022-03-28 NOTE — ASSESSMENT & PLAN NOTE
· BP reviewed  · Continue metoprolol     · Hold ACE-inhibitor for now due to BALJINDER  · Monitor blood pressure

## 2022-03-28 NOTE — ASSESSMENT & PLAN NOTE
· Hemoglobin 8 6, unclear baseline    Was 11 6 in 2020  ·   · Check B12 and folate, iron panel  · Daily CBC and trend hemoglobin

## 2022-03-28 NOTE — ASSESSMENT & PLAN NOTE
· Hemoglobin 8 6, unclear baseline    Was 11 6 in 2020  ·   · Folate 2 1- start a supplement; vitamin B12- normal  · Iron panel results appreciated  · No active overt bleeding; hold off on transfusion unless hemoglobin less than 7

## 2022-03-28 NOTE — H&P
[address] [place] [address] 1960, 63 y o  female MRN: [id number]  Unit/Bed#: ED 21 Encounter: [id number]  Primary Care Provider: LETTY Hull   Date and time admitted to hospital: 3/28/2022  2:52 AM    * Acute kidney injury [facility])  Assessment & Plan  · Presents with generalized weakness and abdominal pain  · Creatinine 1 73, recent baseline appears to be around 1 1  Suspect prerenal due to GI losses  · Received 1 L normal saline in the ER  · Further fluids restricted due to persistent hyponatremia in the setting of thiazide use pending nephrology consult  · Daily metabolic panel and trend creatinine  · Avoid nephrotoxins and hypotension    Gastroenteritis  Assessment & Plan  · Presents with generalized weakness and left upper quadrant abdominal pain in the setting of recent viral illness, history of gastric bypass in 1998  · She has not been able to keep down any oral intake for 5-6 days  · COVID negative  · Lipase 145  · Bilirubin 1 36  · AST 55  · Alk phos 157  · CT abdomen pelvis: Findings as above suspicious for gastritis/duodenitis, i e  peptic ulcer disease, in the proper clinical setting  Hepatic steatosis re-demonstrated  · GI consult    Hyponatremia  Assessment & Plan  · Sodium 120  · Received 1 L normal saline in ER  · Repeat sodium 119  · Could be due to thiazide diuretic verses GI losses  · Fluid restriction 1200 mL  · Nephrology consult  · Urine osmolality, urine sodium pending, check serum osmolality  · TSH 5 879    Macrocytic anemia  Assessment & Plan  · Hemoglobin 8 6, unclear baseline    Was 11 6 in 2020  ·   · Check B12 and folate, iron panel  · Daily CBC and trend hemoglobin    Acute cystitis without hematuria  Assessment & Plan  · Urinalysis positive for leukocyte esterase, leukocytes, and bacteria  · Treated with ceftriaxone in ER-continue 1 g IV every 24 hours  · Urine culture is pending    Painful tongue  Assessment & Plan  · Complains of right lateral tongue burning  · Viscous lidocaine swish and spit  · Serial assessments    High anion gap metabolic acidosis  Assessment & Plan  · Anion gap is 17 CO2 19  · In the setting of BALJINDER, GI losses  · Treat suspected underlying cause of BALJINDER  · Daily metabolic panel and trend    Hypomagnesemia  Assessment & Plan  · Magnesium 1 0  · Received 2 g IV x 2  · Repeat magnesium 2 3  · Recheck magnesium periodically    Hypokalemia  Assessment & Plan  · Potassium 3 2  · Received 20 mEq IV in ER  · Repeat potassium 3 8  · Recheck metabolic panel and trend potassium    Essential hypertension  Assessment & Plan  · BP reviewed  · Continue metoprolol  · Hold Diovan due to hyponatremia  Will use losartan 100 mg as formulary substituted for valsartan  · Monitor blood pressure    VTE Prophylaxis: Heparin  / sequential compression device and reason for no mechanical VTE prophylaxis VTE score 3   Code Status:  Full  POLST: POLST form is not discussed and not completed at this time  Discussion with family:  Patient    Anticipated Length of Stay:  Patient will be admitted on an Inpatient basis with an anticipated length of stay of  greater than 2 midnights  Justification for Hospital Stay:  Per plan above    Total Time for Visit, including Counseling / Coordination of Care: 45 minutes  Greater than 50% of this total time spent on direct patient counseling and coordination of care  Chief Complaint:   Abdominal pain and generalized weakness    History of Present Illness:    Sarwat Mckeon is a 63 y o  female with a history of essential hypertension, and gastric bypass who presents with abdominal pain and generalized weakness  She says that she has had difficulty in tolerating some food since her gastric bypass in 1998, however for the past 5-6 days she is gagging at the smell of food and has not been able to keep anything down but liquid, and only small amounts  Her family had a viral GI illness about 2 weeks ago  She also reports left upper quadrant and left lower quadrant pain  She says the pain is intermittent, sharp, non-radiating, and occurs after she has diarrhea  The diarrhea just started today  It is watery and non-bloody  She also reports intermittent vaginal pain  She denies fever or chills, shortness of breath, chest pain, myalgias, or syncope  Review of Systems:    Review of Systems   Constitutional: Negative for chills and fever  Respiratory: Negative for shortness of breath  Cardiovascular: Negative for chest pain and leg swelling  Gastrointestinal: Positive for abdominal pain, diarrhea, nausea and vomiting  Genitourinary: Positive for vaginal pain  Musculoskeletal: Negative for arthralgias and myalgias  Skin: Negative for rash  Neurological: Negative for dizziness, syncope, weakness and headaches  All other systems reviewed and are negative  Past Medical and Surgical History:     Past Medical History:   Diagnosis Date    Anxiety     Heart attack [facility])     History of blood transfusion     History of depression     Hypertension        Past Surgical History:   Procedure Laterality Date    BREAST CYST EXCISION Right 1999  benign    BREAST SURGERY Right     calcifications      SECTION  ,     CHOLECYSTECTOMY      GASTRIC BYPASS      OOPHORECTOMY Right        Meds/Allergies:    Prior to Admission medications    Medication Sig Start Date End Date Taking?  Authorizing Provider   busPIRone (BUSPAR) 10 mg tablet Take 1 tablet (10 mg total) by mouth 2 (two) times a day  Patient taking differently: Take 10 mg by mouth in the morning   21   LETTY Montaño   diphenhydrAMINE (Benadryl Allergy) 25 mg capsule Take 25 mg by mouth every 6 (six) hours as needed for itching      Historical Provider, MD   estradiol (ESTRACE) 0 1 mg/g vaginal cream Insert 1 g into the vagina 3 (three) times a week Apply a pea sized amount to external vaginal opening and urethra area twice weekly 10/1/21 10/31/21  LETTY Smith   ibuprofen (MOTRIN) 600 mg tablet Take 1 tablet (600 mg total) by mouth every 6 (six) hours as needed for moderate pain 12/22/21   LETTY Lorenzo   metoprolol succinate (TOPROL-XL) 100 mg 24 hr tablet Take 1 tablet (100 mg total) by mouth daily 11/19/21   LETTY Lorenzo   valsartan-hydrochlorothiazide (DIOVAN-HCT) 320-25 MG per tablet Take 1 tablet by mouth daily 11/19/21   Azael AngieLETTY     I have reviewed home medications with patient personally  Allergies: Allergies   Allergen Reactions    Oxycodone-Acetaminophen Hives     per t-system      Propoxyphene Hives    Shellfish Allergy - Food Allergy        Social History:     Marital Status:     Occupation:  Not discussed  Patient Pre-hospital Living Situation:  Home  Patient Pre-hospital Level of Mobility:  Independent  Patient Pre-hospital Diet Restrictions:  Gastric bypass  Substance Use History:   Social History     Substance and Sexual Activity   Alcohol Use Yes     Social History     Tobacco Use   Smoking Status Never Smoker   Smokeless Tobacco Never Used     Social History     Substance and Sexual Activity   Drug Use Not Currently       Family History:    Family History   Problem Relation Age of Onset    Diabetes Mother     Heart attack Father     Diabetes Sister     Substance Abuse Brother     Diabetes Brother     No Known Problems Maternal Grandmother     Breast cancer Paternal Grandmother     No Known Problems Sister     No Known Problems Sister     No Known Problems Maternal Aunt     No Known Problems Maternal Aunt     No Known Problems Maternal Aunt     No Known Problems Paternal Aunt     No Known Problems Paternal Aunt     No Known Problems Paternal Aunt        Physical Exam:     Vitals:   Blood Pressure: 142/62 (03/28/22 0259)  Pulse: 95 (03/28/22 0645)  Temperature: 97 9 °F (36 6 °C) (03/28/22 0259)  Temp Source: Temporal (03/28/22 0259)  Respirations: 18 (03/28/22 0259)  Height: 5' 1" (154 9 cm) (03/28/22 0259)  Weight - Scale: 79 4 kg (175 lb) (03/28/22 0259)  SpO2: 99 % (03/28/22 0645)    Physical Exam  Vitals and nursing note reviewed  HENT:      Head: Normocephalic and atraumatic  Mouth/Throat:      Mouth: Mucous membranes are dry  Pharynx: Oropharynx is clear  Comments: Fissured areas to right side of tongue were patient is complaining of burning  No swelling  No drooling  Eyes:      Pupils: Pupils are equal, round, and reactive to light  Cardiovascular:      Rate and Rhythm: Normal rate  Pulmonary:      Effort: Pulmonary effort is normal  No respiratory distress  Breath sounds: Normal breath sounds  Abdominal:      General: Bowel sounds are normal       Palpations: Abdomen is soft  Tenderness: There is abdominal tenderness  Comments: Left upper and left lower abdominal tenderness to palpation   Musculoskeletal:      Cervical back: Neck supple  Skin:     General: Skin is warm and dry  Capillary Refill: Capillary refill takes less than 2 seconds  Neurological:      General: No focal deficit present  Mental Status: She is alert  Additional Data:     Lab Results: I have personally reviewed pertinent reports        Results from last 7 days   Lab Units 03/28/22  0324   WBC Thousand/uL 6 51   HEMOGLOBIN g/dL 8 6*   HEMATOCRIT % 23 5*   PLATELETS Thousands/uL 289   NEUTROS PCT % 60   LYMPHS PCT % 25   MONOS PCT % 10   EOS PCT % 3     Results from last 7 days   Lab Units 03/28/22  0906 03/28/22  0324 03/28/22  0324   SODIUM mmol/L 119*   < > 120*   POTASSIUM mmol/L 3 8   < > 3 2*   CHLORIDE mmol/L 87*   < > 84*   CO2 mmol/L 19*   < > 19*   BUN mg/dL 19   < > 22   CREATININE mg/dL 1 56*   < > 1 73*   ANION GAP mmol/L 13   < > 17*   CALCIUM mg/dL 7 9*   < > 8 8   ALBUMIN g/dL  --   --  3 9   TOTAL BILIRUBIN mg/dL  --   --  1 36*   ALK PHOS U/L  --   --  157*   ALT U/L  -- --  31   AST U/L  --   --  55*   GLUCOSE RANDOM mg/dL 127   < > 106    < > = values in this interval not displayed  Imaging: I have personally reviewed pertinent reports  and I have personally reviewed pertinent films in PACS    CT abdomen pelvis wo contrast   Final Result by Alize Jeong MD (03/28 3413)      Limited secondary to lack of enteric and IV contrast   Findings as above suspicious for gastritis/duodenitis, i e  peptic ulcer disease, in the proper clinical setting  Correlate clinically and consider follow-up GI consultation  No free air  Redemonstrated diffuse hepatic steatosis  Workstation performed: [id number]         XR chest 1 view portable    (Results Pending)       EKG, Pathology, and Other Studies Reviewed on Admission:   · EKG: NSR rate of 99 with LBBB    Allscripts / Epic Records Reviewed: Yes     ** Please Note: This note has been constructed using a voice recognition system   **

## 2022-03-29 LAB
ALBUMIN SERPL BCP-MCNC: 3.6 G/DL (ref 3.5–5)
ALP SERPL-CCNC: 131 U/L (ref 34–104)
ALT SERPL W P-5'-P-CCNC: 21 U/L (ref 7–52)
ANION GAP SERPL CALCULATED.3IONS-SCNC: 11 MMOL/L (ref 4–13)
ANION GAP SERPL CALCULATED.3IONS-SCNC: 14 MMOL/L (ref 4–13)
AST SERPL W P-5'-P-CCNC: 31 U/L (ref 13–39)
ATRIAL RATE: 90 BPM
BILIRUB DIRECT SERPL-MCNC: 0.36 MG/DL (ref 0–0.2)
BILIRUB SERPL-MCNC: 1.07 MG/DL (ref 0.2–1)
BUN SERPL-MCNC: 16 MG/DL (ref 5–25)
BUN SERPL-MCNC: 17 MG/DL (ref 5–25)
CALCIUM SERPL-MCNC: 8.7 MG/DL (ref 8.4–10.2)
CALCIUM SERPL-MCNC: 8.8 MG/DL (ref 8.4–10.2)
CAMPYLOBACTER DNA SPEC NAA+PROBE: NORMAL
CHLORIDE SERPL-SCNC: 91 MMOL/L (ref 96–108)
CHLORIDE SERPL-SCNC: 91 MMOL/L (ref 96–108)
CO2 SERPL-SCNC: 20 MMOL/L (ref 21–32)
CO2 SERPL-SCNC: 23 MMOL/L (ref 21–32)
CREAT SERPL-MCNC: 1.47 MG/DL (ref 0.6–1.3)
CREAT SERPL-MCNC: 1.52 MG/DL (ref 0.6–1.3)
ERYTHROCYTE [DISTWIDTH] IN BLOOD BY AUTOMATED COUNT: 12.7 % (ref 11.6–15.1)
GFR SERPL CREATININE-BSD FRML MDRD: 37 ML/MIN/1.73SQ M
GFR SERPL CREATININE-BSD FRML MDRD: 38 ML/MIN/1.73SQ M
GLUCOSE SERPL-MCNC: 114 MG/DL (ref 65–140)
GLUCOSE SERPL-MCNC: 120 MG/DL (ref 65–140)
HCT VFR BLD AUTO: 20.8 % (ref 34.8–46.1)
HGB BLD-MCNC: 7.7 G/DL (ref 11.5–15.4)
MAGNESIUM SERPL-MCNC: 1.9 MG/DL (ref 1.9–2.7)
MCH RBC QN AUTO: 40.3 PG (ref 26.8–34.3)
MCHC RBC AUTO-ENTMCNC: 37 G/DL (ref 31.4–37.4)
MCV RBC AUTO: 109 FL (ref 82–98)
P AXIS: 42 DEGREES
PHOSPHATE SERPL-MCNC: 2.4 MG/DL (ref 2.7–4.5)
PLATELET # BLD AUTO: 228 THOUSANDS/UL (ref 149–390)
PMV BLD AUTO: 8.9 FL (ref 8.9–12.7)
POTASSIUM SERPL-SCNC: 3 MMOL/L (ref 3.5–5.3)
POTASSIUM SERPL-SCNC: 3.1 MMOL/L (ref 3.5–5.3)
PR INTERVAL: 204 MS
PROT SERPL-MCNC: 5.8 G/DL (ref 6.4–8.4)
QRS AXIS: 66 DEGREES
QRSD INTERVAL: 140 MS
QT INTERVAL: 420 MS
QTC INTERVAL: 513 MS
RBC # BLD AUTO: 1.91 MILLION/UL (ref 3.81–5.12)
SALMONELLA DNA SPEC QL NAA+PROBE: NORMAL
SHIGA TOXIN STX GENE SPEC NAA+PROBE: NORMAL
SHIGELLA DNA SPEC QL NAA+PROBE: NORMAL
SODIUM SERPL-SCNC: 125 MMOL/L (ref 135–147)
SODIUM SERPL-SCNC: 125 MMOL/L (ref 135–147)
T WAVE AXIS: -52 DEGREES
VENTRICULAR RATE: 90 BPM
WBC # BLD AUTO: 3.9 THOUSAND/UL (ref 4.31–10.16)

## 2022-03-29 PROCEDURE — 87493 C DIFF AMPLIFIED PROBE: CPT | Performed by: FAMILY MEDICINE

## 2022-03-29 PROCEDURE — 80048 BASIC METABOLIC PNL TOTAL CA: CPT | Performed by: PHYSICIAN ASSISTANT

## 2022-03-29 PROCEDURE — 80076 HEPATIC FUNCTION PANEL: CPT | Performed by: NURSE PRACTITIONER

## 2022-03-29 PROCEDURE — 93010 ELECTROCARDIOGRAM REPORT: CPT | Performed by: INTERNAL MEDICINE

## 2022-03-29 PROCEDURE — 36415 COLL VENOUS BLD VENIPUNCTURE: CPT | Performed by: PHYSICIAN ASSISTANT

## 2022-03-29 PROCEDURE — 85027 COMPLETE CBC AUTOMATED: CPT | Performed by: PHYSICIAN ASSISTANT

## 2022-03-29 PROCEDURE — 84100 ASSAY OF PHOSPHORUS: CPT | Performed by: PHYSICIAN ASSISTANT

## 2022-03-29 PROCEDURE — 99232 SBSQ HOSP IP/OBS MODERATE 35: CPT | Performed by: NURSE PRACTITIONER

## 2022-03-29 PROCEDURE — 99232 SBSQ HOSP IP/OBS MODERATE 35: CPT | Performed by: INTERNAL MEDICINE

## 2022-03-29 PROCEDURE — 83735 ASSAY OF MAGNESIUM: CPT | Performed by: NURSE PRACTITIONER

## 2022-03-29 PROCEDURE — C9113 INJ PANTOPRAZOLE SODIUM, VIA: HCPCS | Performed by: FAMILY MEDICINE

## 2022-03-29 PROCEDURE — 99232 SBSQ HOSP IP/OBS MODERATE 35: CPT | Performed by: PHYSICIAN ASSISTANT

## 2022-03-29 RX ORDER — MAGNESIUM SULFATE 1 G/100ML
1 INJECTION INTRAVENOUS ONCE
Status: COMPLETED | OUTPATIENT
Start: 2022-03-29 | End: 2022-03-29

## 2022-03-29 RX ORDER — GUAIFENESIN 100 MG/5ML
200 SOLUTION ORAL EVERY 4 HOURS PRN
Status: DISCONTINUED | OUTPATIENT
Start: 2022-03-29 | End: 2022-04-05 | Stop reason: HOSPADM

## 2022-03-29 RX ORDER — POTASSIUM CHLORIDE 20 MEQ/1
40 TABLET, EXTENDED RELEASE ORAL
Status: DISCONTINUED | OUTPATIENT
Start: 2022-03-29 | End: 2022-03-29

## 2022-03-29 RX ORDER — BUSPIRONE HYDROCHLORIDE 5 MG/1
10 TABLET ORAL 2 TIMES DAILY
Status: DISCONTINUED | OUTPATIENT
Start: 2022-03-29 | End: 2022-03-31

## 2022-03-29 RX ORDER — POTASSIUM CHLORIDE 20 MEQ/1
40 TABLET, EXTENDED RELEASE ORAL
Status: COMPLETED | OUTPATIENT
Start: 2022-03-29 | End: 2022-03-29

## 2022-03-29 RX ORDER — SODIUM CHLORIDE, SODIUM GLUCONATE, SODIUM ACETATE, POTASSIUM CHLORIDE, MAGNESIUM CHLORIDE, SODIUM PHOSPHATE, DIBASIC, AND POTASSIUM PHOSPHATE .53; .5; .37; .037; .03; .012; .00082 G/100ML; G/100ML; G/100ML; G/100ML; G/100ML; G/100ML; G/100ML
100 INJECTION, SOLUTION INTRAVENOUS CONTINUOUS
Status: DISCONTINUED | OUTPATIENT
Start: 2022-03-29 | End: 2022-04-02

## 2022-03-29 RX ORDER — FOLIC ACID 1 MG/1
1 TABLET ORAL DAILY
Status: DISCONTINUED | OUTPATIENT
Start: 2022-03-29 | End: 2022-04-05 | Stop reason: HOSPADM

## 2022-03-29 RX ADMIN — POTASSIUM CHLORIDE 40 MEQ: 1500 TABLET, EXTENDED RELEASE ORAL at 13:19

## 2022-03-29 RX ADMIN — BUSPIRONE HYDROCHLORIDE 10 MG: 5 TABLET ORAL at 23:23

## 2022-03-29 RX ADMIN — GUAIFENESIN 200 MG: 200 SOLUTION ORAL at 16:05

## 2022-03-29 RX ADMIN — HEPARIN SODIUM 5000 UNITS: 5000 INJECTION INTRAVENOUS; SUBCUTANEOUS at 05:57

## 2022-03-29 RX ADMIN — MAGNESIUM SULFATE HEPTAHYDRATE 1 G: 1 INJECTION, SOLUTION INTRAVENOUS at 13:18

## 2022-03-29 RX ADMIN — POTASSIUM CHLORIDE 40 MEQ: 1500 TABLET, EXTENDED RELEASE ORAL at 10:56

## 2022-03-29 RX ADMIN — GUAIFENESIN 200 MG: 200 SOLUTION ORAL at 23:23

## 2022-03-29 RX ADMIN — CEFTRIAXONE 1000 MG: 1 INJECTION, SOLUTION INTRAVENOUS at 05:57

## 2022-03-29 RX ADMIN — SODIUM CHLORIDE, SODIUM GLUCONATE, SODIUM ACETATE, POTASSIUM CHLORIDE, MAGNESIUM CHLORIDE, SODIUM PHOSPHATE, DIBASIC, AND POTASSIUM PHOSPHATE 150 ML/HR: .53; .5; .37; .037; .03; .012; .00082 INJECTION, SOLUTION INTRAVENOUS at 16:06

## 2022-03-29 RX ADMIN — POTASSIUM CHLORIDE 40 MEQ: 1500 TABLET, EXTENDED RELEASE ORAL at 16:05

## 2022-03-29 RX ADMIN — POTASSIUM CHLORIDE 40 MEQ: 1500 TABLET, EXTENDED RELEASE ORAL at 20:04

## 2022-03-29 RX ADMIN — HEPARIN SODIUM 5000 UNITS: 5000 INJECTION INTRAVENOUS; SUBCUTANEOUS at 13:18

## 2022-03-29 RX ADMIN — LOSARTAN POTASSIUM 100 MG: 50 TABLET, FILM COATED ORAL at 09:23

## 2022-03-29 RX ADMIN — PANTOPRAZOLE SODIUM 40 MG: 40 INJECTION, POWDER, LYOPHILIZED, FOR SOLUTION INTRAVENOUS at 09:23

## 2022-03-29 RX ADMIN — GUAIFENESIN 200 MG: 200 SOLUTION ORAL at 06:11

## 2022-03-29 RX ADMIN — HEPARIN SODIUM 5000 UNITS: 5000 INJECTION INTRAVENOUS; SUBCUTANEOUS at 23:23

## 2022-03-29 RX ADMIN — LIDOCAINE HYDROCHLORIDE 15 ML: 20 SOLUTION OROPHARYNGEAL at 16:05

## 2022-03-29 RX ADMIN — FOLIC ACID 1 MG: 1 TABLET ORAL at 10:56

## 2022-03-29 RX ADMIN — METOPROLOL SUCCINATE 100 MG: 50 TABLET, EXTENDED RELEASE ORAL at 09:22

## 2022-03-29 NOTE — PROGRESS NOTES
Progress Note- Delio Gonzalez 63 y o  female MRN: [id number]    Unit/Bed#: -01 Encounter: [id number]      Assessment and Plan:    61-year-old female status post gastric bypass in 1998 presenting to the ED today due to food aversions followed by short duration of generalized abdominal pain, weakness, nausea and vomiting and diarrhea for 24 hours        Constellation of symptoms and history of sick contacts makes this most consistent with likely infectious enteritis  Less likely possibilities include marginal ulcers in the setting of gastric bypass history  At this time there is no evidence of GI bleeding, no plans for inpatient endoscopic evaluation  Noted downtrending hemoglobin likely from intravascular repletion/dilution  She has chronic anemia likely from poor absorption post bypass  Continue supportive care with repletion of electrolytes, antiemetics as needed  When she is able to tolerate p o , transition to oral PPI  Given history of gastric bypass, she should be discharged on omeprazole 20 mg with instructions to break open capsules for maximal absorption  She should follow-up with GI as an outpatient to consider EGD, and should establish care with bariatric follow-up given concerns for vitamin deficiencies  Folate noted to be low; started on supplementation  Would also consider iron infusions prior to discharge and arrange for close follow up with hematology outpatient   ______________________________________________________________________    Subjective: Tolerating PO intake, eating salad when examined at bedside today   Complains of swelling and discomfort on sides of her mouth    Medication Administration - last 24 hours from 03/28/2022 1557 to 03/29/2022 1557       Date/Time Order Dose Route Action Action by     03/29/2022 0922 metoprolol succinate (TOPROL-XL) 24 hr tablet 100 mg 100 mg Oral Given Celestine Jaimes RN     03/29/2022 1318 heparin (porcine) subcutaneous injection 5,000 Units 5,000 Units Subcutaneous Given Celestine Jaimes RN     03/29/2022 0557 heparin (porcine) subcutaneous injection 5,000 Units 5,000 Units Subcutaneous Given Mirian Bolus, RN     03/28/2022 2157 heparin (porcine) subcutaneous injection 5,000 Units 5,000 Units Subcutaneous Given Iesha Gore, RN     03/29/2022 0557 cefTRIAXone (ROCEPHIN) IVPB (premix in dextrose) 1,000 mg 50 mL 1,000 mg Intravenous 1670 [facility]     03/29/2022 6632 losartan (COZAAR) tablet 100 mg 100 mg Oral Given Nataliia Watson, ERNUKA     03/29/2022 9558 pantoprazole (PROTONIX) injection 40 mg 40 mg Intravenous Given Celestine Jaimes RN     03/28/2022 2156 busPIRone (BUSPAR) tablet 10 mg 10 mg Oral Given Darlene Parker, RENUKA     03/29/2022 5879 guaiFENesin (ROBITUSSIN) oral solution 200 mg 200 mg Oral Given Mirian Bolus, RENUKA     03/29/2022 1056 potassium chloride (K-DUR,KLOR-CON) CR tablet 40 mEq 40 mEq Oral Given Celestine Jaimes RN     23/73/8932 8851 folic acid (FOLVITE) tablet 1 mg 1 mg Oral Given Celestine Jaimes RN     03/29/2022 1318 magnesium sulfate IVPB (premix) SOLN 1 g 1 g Intravenous New [id number] Tay Mendez RN     03/29/2022 1319 potassium chloride (K-DUR,KLOR-CON) CR tablet 40 mEq 40 mEq Oral Given Celestine Jaimes RN          Objective:     Vitals: Blood pressure 125/58, pulse 85, temperature 98 2 °F (36 8 °C), resp  rate 20, height 5' (1 524 m), weight 73 9 kg (162 lb 13 oz), SpO2 98 %  ,Body mass index is 31 8 kg/m²        Intake/Output Summary (Last 24 hours) at 3/29/2022 1557  Last data filed at 3/29/2022 0557  Gross per 24 hour   Intake 50 ml   Output --   Net 50 ml       Physical Exam:   General Appearance: Awake and alert, in no acute distress  Abdomen: Soft, non-tender, non-distended; bowel sounds normal; no masses or no organomegaly    Invasive Devices  Report    Peripheral Intravenous Line            Peripheral IV 02/02/22 Right Antecubital 55 days    Peripheral IV 03/29/22 Left;Ventral (anterior) Forearm <1 day                Lab Results:  Admission on 03/28/2022   Component Date Value    WBC 03/28/2022 6 51     RBC 03/28/2022 2 12*    Hemoglobin 03/28/2022 8 6*    Hematocrit 03/28/2022 23 5*    MCV 03/28/2022 111*    MCH 03/28/2022 40 6*    MCHC 03/28/2022 36 6     RDW 03/28/2022 12 9     MPV 03/28/2022 9 0     Platelets 60/21/3683 289     nRBC 03/28/2022 0     Neutrophils Relative 03/28/2022 60     Immat GRANS % 03/28/2022 1     Lymphocytes Relative 03/28/2022 25     Monocytes Relative 03/28/2022 10     Eosinophils Relative 03/28/2022 3     Basophils Relative 03/28/2022 1     Neutrophils Absolute 03/28/2022 3 99     Immature Grans Absolute 03/28/2022 0 03     Lymphocytes Absolute 03/28/2022 1 64     Monocytes Absolute 03/28/2022 0 63     Eosinophils Absolute 03/28/2022 0 19     Basophils Absolute 03/28/2022 0 03     Sodium 03/28/2022 120*    Potassium 03/28/2022 3 2*    Chloride 03/28/2022 84*    CO2 03/28/2022 19*    ANION GAP 03/28/2022 17*    BUN 03/28/2022 22     Creatinine 03/28/2022 1 73*    Glucose 03/28/2022 106     Calcium 03/28/2022 8 8     AST 03/28/2022 55*    ALT 03/28/2022 31     Alkaline Phosphatase 03/28/2022 157*    Total Protein 03/28/2022 6 5     Albumin 03/28/2022 3 9     Total Bilirubin 03/28/2022 1 36*    eGFR 03/28/2022 31     Magnesium 03/28/2022 1 0*    Lipase 03/28/2022 145*    hs TnI 0hr 03/28/2022 8     SARS-CoV-2 03/28/2022 Negative     INFLUENZA A PCR 03/28/2022 Negative     INFLUENZA B PCR 03/28/2022 Negative     RSV PCR 03/28/2022 Negative     TSH 3RD GENERATON 03/28/2022 5 879*    Color, UA 03/28/2022 Yellow     Clarity, UA 03/28/2022 Clear     Specific Gravity, UA 03/28/2022 1 010     pH, UA 03/28/2022 6 0     Leukocytes, UA 03/28/2022 2+*    Nitrite, UA 03/28/2022 Negative     Protein, UA 03/28/2022 Negative     Glucose, UA 03/28/2022 Negative     Ketones, UA 03/28/2022 Negative     Urobilinogen, UA 03/28/2022 0 2     Bilirubin, UA 03/28/2022 Negative     Blood, UA 03/28/2022 Negative     hs TnI 2hr 03/28/2022 7     Delta 2hr hsTnI 03/28/2022 -1     Free T4 03/28/2022 1 23     hs TnI 4hr 03/28/2022 7     Delta 4hr hsTnI 03/28/2022 -1     RBC, UA 03/28/2022 None Seen     WBC, UA 03/28/2022 20-30*    Epithelial Cells 03/28/2022 Occasional     Bacteria, UA 03/28/2022 Innumerable*    Urine Culture 03/28/2022 >100,000 cfu/ml Gram Negative Ephraim Enteric Like*    Sodium, Ur 03/28/2022 23     Chloride, Ur 03/28/2022 17     Osmolality, Ur 03/28/2022 139*    Sodium 03/28/2022 119*    Potassium 03/28/2022 3 8     Chloride 03/28/2022 87*    CO2 03/28/2022 19*    ANION GAP 03/28/2022 13     BUN 03/28/2022 19     Creatinine 03/28/2022 1 56*    Glucose 03/28/2022 127     Calcium 03/28/2022 7 9*    eGFR 03/28/2022 36     Ventricular Rate 03/28/2022 91     Atrial Rate 03/28/2022 91     W[place] Interval 03/28/2022 192     QRSD Interval 03/28/2022 150     QT Interval 03/28/2022 414     QTC Interval 03/28/2022 509     P Axis 03/28/2022 55     QRS Axis 03/28/2022 70     T Wave Axis 03/28/2022 -50     Ventricular Rate 03/28/2022 99     Atrial Rate 03/28/2022 99     W[place] Interval 03/28/2022 190     QRSD Interval 03/28/2022 140     QT Interval 03/28/2022 396     QTC Interval 03/28/2022 508     P Axis 03/28/2022 52     QRS Axis 03/28/2022 69     T Wave Axis 03/28/2022 -31     Salmonella sp PCR 03/28/2022 None Detected     Shigella sp/Enteroinvasi* 03/28/2022 None Detected     Campylobacter sp (jejuni* 03/28/2022 None Detected     Shiga toxin 1/Shiga toxi* 03/28/2022 None Detected     Magnesium 03/28/2022 2 3     Osmolality Serum 03/28/2022 256*    Vitamin B-12 03/28/2022 674     Folate 03/28/2022 2 1*    Iron Saturation 03/28/2022 86*    TIBC 03/28/2022 272     Iron 03/28/2022 233*    Ferritin 03/28/2022 402*    Sodium 03/29/2022 125*    Potassium 03/29/2022 3 1*    Chloride 03/29/2022 91*    CO2 03/29/2022 20*    ANION GAP 03/29/2022 14*    BUN 03/29/2022 17     Creatinine 03/29/2022 1 47*    Glucose 03/29/2022 114     Calcium 03/29/2022 8 8     eGFR 03/29/2022 38     Creatinine, Ur 03/28/2022 56 4     Sodium 03/28/2022 123*    Potassium 03/28/2022 3 2*    Chloride 03/28/2022 91*    CO2 03/28/2022 20*    ANION GAP 03/28/2022 12     BUN 03/28/2022 17     Creatinine 03/28/2022 1 51*    Glucose 03/28/2022 119     Calcium 03/28/2022 8 2*    eGFR 03/28/2022 37     Phosphorus 03/29/2022 2 4*    WBC 03/29/2022 3 90*    RBC 03/29/2022 1 91*    Hemoglobin 03/29/2022 7 7*    Hematocrit 03/29/2022 20 8*    MCV 03/29/2022 109*    MCH 03/29/2022 40 3*    MCHC 03/29/2022 37 0     RDW 03/29/2022 12 7     Platelets 58/70/1009 228     MPV 03/29/2022 8 9     Magnesium 03/29/2022 1 9     Total Bilirubin 03/29/2022 1 07*    Bilirubin, Direct 03/29/2022 0 36*    Alkaline Phosphatase 03/29/2022 131*    AST 03/29/2022 31     ALT 03/29/2022 21     Total Protein 03/29/2022 5 8*    Albumin 03/29/2022 3 6     Sodium 03/29/2022 125*    Potassium 03/29/2022 3 0*    Chloride 03/29/2022 91*    CO2 03/29/2022 23     ANION GAP 03/29/2022 11     BUN 03/29/2022 16     Creatinine 03/29/2022 1 52*    Glucose 03/29/2022 120     Calcium 03/29/2022 8 7     eGFR 03/29/2022 37     Ventricular Rate 03/28/2022 90     Atrial Rate 03/28/2022 90     OR Interval 03/28/2022 204     QRSD Interval 03/28/2022 140     QT Interval 03/28/2022 420     QTC Interval 03/28/2022 513     P Axis 03/28/2022 42     QRS Axis 03/28/2022 66     T Wave Axis 03/28/2022 -52        Imaging Studies: I have personally reviewed pertinent imaging studies

## 2022-03-29 NOTE — ED NOTES
Pt rang call bell requesting to be disconnected from IV fluids to ambulate to restroom  Pt ambulatory independently with strong steady gait   Pt also requesting "something for cough"     Bay Velazquez RN  03/29/22 3137

## 2022-03-29 NOTE — ED NOTES
Pt made aware that she needs to keep her arm straightened for IV fluids to infuse  Pt has no needs at this time, lights turned down for comfort, call bell in danilo Anne RN  03/28/22 2007

## 2022-03-29 NOTE — PLAN OF CARE
Problem: PAIN - ADULT  Goal: Verbalizes/displays adequate comfort level or baseline comfort level  Description: Interventions:  - Encourage patient to monitor pain and request assistance  - Assess pain using appropriate pain scale  - Administer analgesics based on type and severity of pain and evaluate response  - Implement non-pharmacological measures as appropriate and evaluate response  - Consider cultural and social influences on pain and pain management  - Notify physician/advanced practitioner if interventions unsuccessful or patient reports new pain  Outcome: Progressing     Problem: Potential for Falls  Goal: Patient will remain free of falls  Description: INTERVENTIONS:  - Educate patient/family on patient safety including physical limitations  - Instruct patient to call for assistance with activity   - Consult OT/PT to assist with strengthening/mobility   - Keep Call bell within reach  - Keep bed low and locked with side rails adjusted as appropriate  - Keep care items and personal belongings within reach  - Initiate and maintain comfort rounds  - Make Fall Risk Sign visible to staff  - Apply yellow socks and bracelet for high fall risk patients  - Consider moving patient to room near nurses station  Outcome: Progressing

## 2022-03-29 NOTE — UTILIZATION REVIEW
Initial Clinical Review    Admission: Date/Time/Statement:   Admission Orders (From admission, onward)     Ordered        03/28/22 0551  Inpatient Admission  Once                      Orders Placed This Encounter   Procedures    Inpatient Admission     Standing Status:   Standing     Number of Occurrences:   1     Order Specific Question:   Level of Care     Answer:   Med Surg [16]     Order Specific Question:   Estimated length of stay     Answer:   More than 2 Midnights     Order Specific Question:   Certification     Answer:   I certify that inpatient services are medically necessary for this patient for a duration of greater than two midnights  See H&P and MD Progress Notes for additional information about the patient's course of treatment  ED Arrival Information     Expected Arrival Acuity    - 3/28/2022 02:13 Urgent         Means of arrival Escorted by Service Admission type    ILIANA JIMENEZ  [facility] Hospitalist Urgent         Arrival complaint    vomiting        Chief Complaint   Patient presents with    Abdominal Pain     left upper quadrant pain    Weakness - Generalized       Initial Presentation: 63 y o  female   To ER from home,   Admitted  IP   Status,  Ms level of care for management of  BALJINDER  2/2 gastroenteritis w/electrolyte derangements, met acidosis, Macrocytic anemia  And acute cystitis  H/o  Gastric bypass 1998  To ER today for food aversions followed by generalized abd pain, poor po intake for past 3 wks,  N/V/D for past 24 hrs  IN ER,  Labs revealed   Crt 1 73  (recently 1 1)   Na 120     K 3 2    An gap 17   CO2 - 19    Also macrocitic anemia   w/hgb 8 6   UA  W/leuks, wbc's and innum bacteria  (denies any urinary symptoms)        CTAP (no contrast)  Consistent w/gastritis or duodenitis  Will cont  Telemetry, serial EKG's  Cont  IVAB,   fup urine culture and stool/urine studies  Need to  RESTRICT fluid intake due to persistent hyponatremia in the setting of thiazide   HOLD Ace-I  Trend renal indices, BMP q 6 H w/electrolyte repletion prn   Will also consult GI, trend CBC,  Ck  Vit B levels & Iron panel  3/28  GI CONSULT -  Most likely infectious enteritis  Cont supportive care,  PPI,  REC Iron transfusions & B12 shot if low     3/28  NEPHROLOGY CONSULT  H/o CKD2 w/baseline crt around 1 2  BALJINDER 2/2  GI losses  avoid potential nephrotoxins and continue to provide volume expansion  Patient will initially be given 1 L of sodium bicarbonate at 150 mL/hour, followed by normal saline with 20 mEq per L of potassium chloride at 125 mL/hour  Can change BMP to q 8 hr     Date:     3/29       Day 2:   Feeling about the same - Continues to have diarrhea-nonbloody  Nausea improved but did have episodes of vomiting this am     transaminitis improved, acidosis resolved  Stool cx neg/ cdiff pending  Cont IVF and electrolyte repletion PRN        3/29  GI CONSULT   Symptoms, h/o sick sick contacts makes this most likely infectious enteritis  Folate low - supplement     May need Iron infusions  Before dc         Cont IVF,  hgb 8 6 - unclear baseline, no overt bleeding noted - HOLD OFF transfusion unless hb < 7     ===============================================================      ED Triage Vitals [03/28/22 0259]   Temperature Pulse Respirations Blood Pressure SpO2   97 9 °F (36 6 °C) (!) 111 18 142/62 100 %      Temp Source Heart Rate Source Patient Position - Orthostatic VS BP Location FiO2 (%)   Temporal Monitor Lying Left arm --      Pain Score       7          Wt Readings from Last 1 Encounters:   03/29/22 73 9 kg (162 lb 13 oz)     Additional Vital Signs:   03/29/22 07:40:47 98 2 °F (36 8 °C) 85 20 125/58 80 98 % -- --   03/29/22 02:18:01 98 2 °F (36 8 °C) 84 18 119/55 76 100 % -- --   03/28/22 1915 -- 88 17 139/64 -- 100 % None (Room air) --   03/28/22 0645 -- 95 -- -- -- 99 % -- --   03/28/22 0630 -- 84 -- -- -- 100 % -- --   03/28/22 0600 -- 95 -- -- -- 99 % -- --   03/28/22 0588 -- 91 -- -- -- 100 % -- --   03/28/22 0530 -- 94 -- -- -- 99 % -- --   03/28/22 0525 -- -- -- -- -- -- None (Room air) --   03/28/22 0515 -- 95 -- -- -- 98 % -- --   03/28/22 0500 -- 92 -- -- -- 100 % -- --   03/28/22 0330 -- 93 -- -- -- 100 % --        Pertinent Labs/Diagnostic Test Results:   3/28  ekg  Normal sinus rhythm  Left bundle branch block  Abnormal ECG  When compared with ECG of 02-FEB-2022 10:10,  No significant change was found      XR chest 1 view portable   Final Result by Ruthie Sadler DO (03/28 1234)      No acute cardiopulmonary disease  Workstation performed: [id number]         CT abdomen pelvis wo contrast   Final Result by Johnny Metzger MD (03/28 0502)      Limited secondary to lack of enteric and IV contrast   Findings as above suspicious for gastritis/duodenitis, i e  peptic ulcer disease, in the proper clinical setting  Correlate clinically and consider follow-up GI consultation  No free air  Redemonstrated diffuse hepatic steatosis        Workstation performed: [id number]           Results from last 7 days   Lab Units 03/28/22  0324   SARS-COV-2  Negative     Results from last 7 days   Lab Units 03/29/22  0334 03/28/22  0324   WBC Thousand/uL 3 90* 6 51   HEMOGLOBIN g/dL 7 7* 8 6*   HEMATOCRIT % 20 8* 23 5*   PLATELETS Thousands/uL 228 289   NEUTROS ABS Thousands/µL  --  3 99         Results from last 7 days   Lab Units 03/29/22  0334 03/29/22  0157 03/28/22  1905 03/28/22  0906 03/28/22  0324   SODIUM mmol/L 125* 125* 123* 119* 120*   POTASSIUM mmol/L 3 0* 3 1* 3 2* 3 8 3 2*   CHLORIDE mmol/L 91* 91* 91* 87* 84*   CO2 mmol/L 23 20* 20* 19* 19*   ANION GAP mmol/L 11 14* 12 13 17*   BUN mg/dL 16 17 17 19 22   CREATININE mg/dL 1 52* 1 47* 1 51* 1 56* 1 73*   EGFR ml/min/1 73sq m 37 38 37 36 31   CALCIUM mg/dL 8 7 8 8 8 2* 7 9* 8 8   MAGNESIUM mg/dL 1 9  --   --  2 3 1 0*   PHOSPHORUS mg/dL 2 4*  --   --   --   --      Results from last 7 days   Lab Units 03/29/22  0334 03/28/22  0324   AST U/L 31 55*   ALT U/L 21 31   ALK PHOS U/L 131* 157*   TOTAL PROTEIN g/dL 5 8* 6 5   ALBUMIN g/dL 3 6 3 9   TOTAL BILIRUBIN mg/dL 1 07* 1 36*   BILIRUBIN DIRECT mg/dL 0 36*  --          Results from last 7 days   Lab Units 03/29/22  0334 03/29/22  0157 03/28/22  1905 03/28/22  0906 03/28/22  0324   GLUCOSE RANDOM mg/dL 120 114 119 127 106     Results from last 7 days   Lab Units 03/28/22  0549   OSMOLALITY, SERUM mmol/*     Results from last 7 days   Lab Units 03/28/22  0906 03/28/22  0549 03/28/22  0324   HS TNI 0HR ng/L  --   --  8   HS TNI 2HR ng/L  --  7  --    HSTNI D2 ng/L  --  -1  --    HS TNI 4HR ng/L 7  --   --    HSTNI D4 ng/L -1  --   --              Results from last 7 days   Lab Units 03/28/22  0324   TSH 3RD GENERATON uIU/mL 5 879*     Results from last 7 days   Lab Units 03/28/22  0906   FERRITIN ng/mL 402*         Results from last 7 days   Lab Units 03/28/22  0324   LIPASE u/L 145*     Results from last 7 days   Lab Units 03/28/22  0549 03/28/22  0448   OSMOLALITY, SERUM mmol/*  --    OSMO UR mmol/KG  --  139*     Results from last 7 days   Lab Units 03/28/22  0448   CLARITY UA  Clear   COLOR UA  Yellow   SPEC GRAV UA  1 010   PH UA  6 0   GLUCOSE UA mg/dl Negative   KETONES UA mg/dl Negative   BLOOD UA  Negative   PROTEIN UA mg/dl Negative   NITRITE UA  Negative   BILIRUBIN UA  Negative   UROBILINOGEN UA E U /dl 0 2   LEUKOCYTES UA  2+*   WBC UA /hpf 20-30*   RBC UA /hpf None Seen   BACTERIA UA /hpf Innumerable*   EPITHELIAL CELLS WET PREP /hpf Occasional   SODIUM UR  23   CREATININE UR mg/dL 56 4     Results from last 7 days   Lab Units 03/28/22  0324   INFLUENZA A PCR  Negative   INFLUENZA B PCR  Negative   RSV PCR  Negative   ED Treatment:   Medication Administration from 03/28/2022 0213 to 03/29/2022 0211       Date/Time Order Dose Route Action     03/28/2022 0321 sodium chloride 0 9 % bolus 1,000 mL 1,000 mL Intravenous New Bag     03/28/2022 0321 ondansetron (ZOFRAN) injection 4 mg 4 mg Intravenous Given     03/28/2022 0819 potassium chloride 20 mEq IVPB (premix) 20 mEq Intravenous New Bag     03/28/2022 0448 magnesium sulfate 2 g/50 mL IVPB (premix) 2 g 2 g Intravenous New Bag     03/28/2022 2886 magnesium sulfate 2 g/50 mL IVPB (premix) 2 g 2 g Intravenous New Bag     03/28/2022 0543 cefTRIAXone (ROCEPHIN) IVPB (premix in dextrose) 1,000 mg 50 mL 1,000 mg Intravenous New Bag     03/28/2022 2157 heparin (porcine) subcutaneous injection 5,000 Units 5,000 Units Subcutaneous Given     03/28/2022 1005 diphenhydrAMINE (BENADRYL) injection 25 mg 25 mg Intravenous Given     03/28/2022 1006 losartan (COZAAR) tablet 100 mg 100 mg Oral Given     03/28/2022 1400 sodium bicarbonate 150 mEq in dextrose 5 % 1,000 mL infusion 150 mL/hr Intravenous New Bag     03/28/2022 1400 pantoprazole (PROTONIX) injection 40 mg 40 mg Intravenous Given     03/28/2022 2156 busPIRone (BUSPAR) tablet 10 mg 10 mg Oral Given          Past Medical History:   Diagnosis Date    Anxiety     Heart attack ([facility] [address] ) 2006    History of blood transfusion 1999    History of depression     Hypertension      Present on Admission:   Essential hypertension      Admitting Diagnosis: Gastroenteritis [K52 9]  Hyponatremia [E87 1]  Nausea [R11 0]  UTI (urinary tract infection) [N39 0]  Fatigue [R53 83]  Weakness generalized [R53 1]  Abdominal pain [R10 9]  Age/Sex: 63 y o  female     Admission Orders:    (see plan above)  BMP q 6 hr;  Clear liquids -  1200 ml fluid restriction;   Stool studies (Cdiff)      3/29 One time MED  1056 PO Kcl 40 meq   1320 IV Mg  1 gm ,  PO Kcl 40 meq   1605    PO Kcl 40 meq       Scheduled Medications:  busPIRone, 10 mg, Oral, Daily  cefTRIAXone, 1,000 mg, Intravenous, Q24H  heparin (porcine), 5,000 Units, Subcutaneous, Q8H AJ  losartan, 100 mg, Oral, Daily  metoprolol succinate, 100 mg, Oral, Daily  pantoprazole, 40 mg, Intravenous, Q24H AJ    Continuous IV Infusions:  sodium chloride, 125 mL/hr, Intravenous, Continuous      PRN Meds:  acetaminophen, 650 mg, Oral, Q6H PRN  diphenhydrAMINE, 25 mg, Oral, Q6H PRN  guaiFENesin, 200 mg, Oral, Q4H PRN  Lidocaine Viscous HCl, 15 mL, Swish & Spit, 4x Daily PRN  ondansetron, 4 mg, Intravenous, Q6H PRN        IP CONSULT TO NEPHROLOGY  IP CONSULT TO GASTROENTEROLOGY  IP CONSULT TO NEPHROLOGY    Network Utilization Review Department  ATTENTION: Please call with any questions or concerns to [phone] and carefully listen to the prompts so that you are directed to the right person  All voicemails are confidential   [employer] all requests for admission clinical reviews, approved or denied determinations and any other requests to dedicated fax number below belonging to the campus where the patient is receiving treatment   List of dedicated fax numbers for the Facilities:  [address] DENIALS (Administrative/Medical Necessity) [phone]   [address] (Maternity/NICU/Pediatrics) [phone]   [address] [place] [address]  [address] [facility] [place] [address] [id number] [place] [address] [address] [facility] [place] [address] [phone]

## 2022-03-29 NOTE — PLAN OF CARE
Problem: PAIN - ADULT  Goal: Verbalizes/displays adequate comfort level or baseline comfort level  Description: Interventions:  - Encourage patient to monitor pain and request assistance  - Assess pain using appropriate pain scale  - Administer analgesics based on type and severity of pain and evaluate response  - Implement non-pharmacological measures as appropriate and evaluate response  - Consider cultural and social influences on pain and pain management  - Notify physician/advanced practitioner if interventions unsuccessful or patient reports new pain  Outcome: Progressing     Problem: INFECTION - ADULT  Goal: Absence or prevention of progression during hospitalization  Description: INTERVENTIONS:  - Assess and monitor for signs and symptoms of infection  - Monitor lab/diagnostic results  - Monitor all insertion sites, i e  indwelling lines, tubes, and drains  - Monitor endotracheal if appropriate and nasal secretions for changes in amount and color  - [place] appropriate cooling/warming therapies per order  - Administer medications as ordered  - Instruct and encourage patient and family to use good hand hygiene technique  - Identify and instruct in appropriate isolation precautions for identified infection/condition  Outcome: Progressing  Goal: Absence of fever/infection during neutropenic period  Description: INTERVENTIONS:  - Monitor WBC    Outcome: Progressing     Problem: DISCHARGE PLANNING  Goal: Discharge to home or other facility with appropriate resources  Description: INTERVENTIONS:  - Identify barriers to discharge w/patient and caregiver  - Arrange for needed discharge resources and transportation as appropriate  - Identify discharge learning needs (meds, wound care, etc )  - Arrange for interpretive services to assist at discharge as needed  - Refer to Case Management Department for coordinating discharge planning if the patient needs post-hospital services based on physician/advanced practitioner order or complex needs related to functional status, cognitive ability, or social support system  Outcome: Progressing     Problem: Knowledge Deficit  Goal: Patient/family/caregiver demonstrates understanding of disease process, treatment plan, medications, and discharge instructions  Description: Complete learning assessment and assess knowledge base    Interventions:  - Provide teaching at level of understanding  - Provide teaching via preferred learning methods  Outcome: Progressing

## 2022-03-29 NOTE — CASE MANAGEMENT
Case Management Assessment & Discharge Planning Note    Patient name Frankey Dross  Location [id number] MRN [id number]  : 1960 Date 3/29/2022       Current Admission Date: 3/28/2022  Current Admission Diagnosis:Acute kidney injury [facility])   Patient Active Problem List    Diagnosis Date Noted    Acute kidney injury ([address] ) 2022    Hyponatremia 2022    Hypokalemia 2022    Hypomagnesemia 2022    High anion gap metabolic acidosis     Gastroenteritis 2022    Painful tongue 2022    Acute cystitis without hematuria 2022    Macrocytic anemia 2022    Arthritis of lumbar spine 2021    Pap smear for cervical cancer screening 2021    Postmenopausal bleeding 2021    Vaginal atrophy 2021    At risk for loss of bone density 2021    Dyspareunia, female 2021    Class 1 obesity due to excess calories without serious comorbidity with body mass index (BMI) of 33 0 to 33 9 in adult 2020    Essential hypertension 2020    Anxiety and depression 2020    History of myocardial infarction 2017    S/P gastric bypass 2014      LOS (days): 1  Geometric Mean LOS (GMLOS) (days):   Days to GMLOS:     OBJECTIVE:    Risk of Unplanned Readmission Score: 10     Current admission status: Inpatient  Preferred Pharmacy:   [facility] [address] Osmani Thompson, [address] [place] [address]  Phone: [phone] Fax: [phone]    Primary Care Provider: LETTY Tamez    Primary Insurance:   Secondary Insurance:     ASSESSMENT:  Samir Christian Proxies    There are no active Health Care Proxies on file         Advance Directives  Does patient have a [address]?: No (Declined)  Was patient offered paperwork?: Yes (Declined)  Does patient currently have a Health Care decision maker?: Yes, please see Health Care Proxy section  Does patient have Advance Directives?: No  Was patient offered paperwork?: Yes (Declined)  Primary Contact: Kamron Ramires SO    Readmission Root Cause  30 Day Readmission: No    Patient Information  Admitted from[de-identified] Home  Mental Status: Alert  During Assessment patient was accompanied by: Not accompanied during assessment  Assessment information provided by[de-identified] Patient  Primary Caregiver: Self  Support Systems: Spouse/significant other  [place] of Residence: [facility] [address] do you live in?: [facility] entry access options  Select all that apply : Stairs  Number of steps to enter home : 4  Do the steps have railings?: Yes  Type of Current Residence: 2 [place] home  Upon entering residence, is there a bedroom on the main floor (no further steps)?: Yes  Upon entering residence, is there a bathroom on the main floor (no further steps)?: Yes  In the last 12 months, was there a time when you were not able to pay the mortgage or rent on time?: No (States may be late this month if in hospital)  In the last 12 months, how many places have you lived?: 1  In the last 12 months, was there a time when you did not have a steady place to sleep or slept in a shelter (including now)?: No  Homeless/housing insecurity resource given?: N/A  Living Arrangements: Lives w/ Spouse/significant other  Is patient a ?: No    Activities of Daily Living Prior to Admission  Functional Status: Independent  Completes ADLs independently?: Yes  Ambulates independently?: Yes  Does patient use assisted devices?: No  Does patient currently own DME?: No  Does patient have a history of Outpatient Therapy (PT/OT)?: No  Does the patient have a history of Short-Term Rehab?: No  Does patient have a history of HHC?: No  Does patient currently have [address]?: No    Patient Information Continued  Income Source: Employed  Does patient have prescription coverage?: No  Within the past 12 months, you worried that your food would run out before you got the money to buy more  Petra Shone Never true  Within the past 12 months, the food you bought just didnt last and you didnt have money to get more : Never true  Food insecurity resource given?: N/A  Does patient receive dialysis treatments?: No  Does patient have a history of substance abuse?: No  Does patient have a history of Mental Health Diagnosis?: No    PHQ 2/9 Screening   Reviewed PHQ 2/9 Depression Screening Score?: No    Means of Transportation  Means of Transport to Appts[de-identified] Drives Self  In the past 12 months, has lack of transportation kept you from medical appointments or from getting medications?: No  In the past 12 months, has lack of transportation kept you from meetings, work, or from getting things needed for daily living?: No  Was application for public transport provided?: N/A        DISCHARGE DETAILS:    Discharge planning discussed with[de-identified] [address] [place] [address]         Is the patient interested in [place] [address] at discharge?: No    DME Referral Provided  Referral made for DME?: No    Other Referral/Resources/Interventions Provided:  Financial Resources Provided: Financial Counselor    Treatment Team Recommendation: Home  Discharge Destination Plan[de-identified] Home

## 2022-03-29 NOTE — PROGRESS NOTES
[address]  Progress Note - Katie Bull 1960, 63 y o  female MRN: [id number]  Unit/Bed#: -01 Encounter: [id number]  Primary Care Provider: LETTY Vaz   Date and time admitted to hospital: 3/28/2022  2:52 AM    * Acute kidney injury [facility])  Assessment & Plan  · Presents with generalized weakness and abdominal pain  · Creatinine 1 73 on admission, recent baseline appears to be around 1 1 mg/dL  Suspect prerenal due to GI losses  · Received 1 L normal saline in the ER; continue with IV fluid  · Nephrology input appreciated  · Daily metabolic panel and trend creatinine  · Avoid nephrotoxins and hypotension    Macrocytic anemia  Assessment & Plan  · Hemoglobin 8 6, unclear baseline  Was 11 6 in 2020  ·   · Folate 2 1- start a supplement; vitamin B12- normal  · Iron panel results appreciated  · No active overt bleeding; hold off on transfusion unless hemoglobin less than 7    Acute cystitis without hematuria  Assessment & Plan  · Urinalysis positive for leukocyte esterase, leukocytes, and bacteria  · Treated with ceftriaxone in ER-continue 1 g IV every 24 hours  · Urine culture is pending     Painful tongue  Assessment & Plan  · Complains of right lateral tongue burning  · Viscous lidocaine swish and spit-- improving     Gastroenteritis  Assessment & Plan  · Presents with generalized weakness and left upper quadrant abdominal pain in the setting of recent viral illness, history of gastric bypass in 1998  · She has not been able to keep down any oral intake for 5-6 days  · COVID negative  · Mild transaminitis-- improving  · CT abdomen pelvis: Findings as above suspicious for gastritis/duodenitis, i e  peptic ulcer disease, in the proper clinical setting  Hepatic steatosis re-demonstrated     · GI input appreciated- suspected infectious in etiology  · Stool cultures- negative; C difficile-pending    High anion gap metabolic acidosis  Assessment & Plan  · Anion gap is 17 CO2 19  · In the setting of BALJINDER, GI losses  · This has resolved with IV fluid    Hypomagnesemia  Assessment & Plan  · Continue to replete and monitor    Hypokalemia  Assessment & Plan  · Continue to replete and monitor    Hyponatremia  Assessment & Plan  · Sodium 120  · Suspected hypovolemia hyponatremia with concurrent use of thiazide diuresis with GI losses  · Nephrology input appreciated-continue with IV fluid for volume expansion  · TSH 5 879  · Monitor sodium level closely    Essential hypertension  Assessment & Plan  · BP reviewed  · Continue metoprolol  · Hold ACE-inhibitor for now due to BALJINDER  · Monitor blood pressure        VTE Prophylaxis:  Heparin    Patient Centered Rounds: I have performed bedside rounds with nursing staff today  Discussions with Specialists or Other Care Team Provider:  GI nephrology  Education and Discussions with Family / Patient:  Patient and  at bedside    Current Length of Stay: 1 day(s)    Current Patient Status: Inpatient   Certification Statement: The patient will continue to require additional inpatient hospital stay due to Acute kidney injury    Discharge Plan:  Pending hospital course    Code Status: Level 1 - Full Code    Subjective:   Feeling about the same  Continues to have diarrhea-nonbloody, nausea vomiting slightly improved with she did have and episodes of vomiting this a m  Ena Sequin Denies any chest pain or palpitation  Objective:     Vitals:   Temp (24hrs), Av 2 °F (36 8 °C), Min:98 2 °F (36 8 °C), Max:98 2 °F (36 8 °C)    Temp:  [98 2 °F (36 8 °C)] 98 2 °F (36 8 °C)  HR:  [84-88] 85  Resp:  [17-20] 20  BP: (119-139)/(55-64) 125/58  SpO2:  [98 %-100 %] 98 %  Body mass index is 31 8 kg/m²  Input and Output Summary (last 24 hours):        Intake/Output Summary (Last 24 hours) at 3/29/2022 1620  Last data filed at 3/29/2022 0557  Gross per 24 hour   Intake 50 ml   Output --   Net 50 ml       Physical Exam:   Physical Exam  Vitals and nursing note reviewed  Constitutional:       General: She is not in acute distress  Appearance: Normal appearance  HENT:      Head: Normocephalic and atraumatic  Right Ear: External ear normal       Left Ear: External ear normal       Nose: Nose normal  No rhinorrhea  Mouth/Throat:      Mouth: Mucous membranes are moist       Pharynx: Oropharynx is clear  Eyes:      General:         Right eye: No discharge  Left eye: No discharge  Pupils: Pupils are equal, round, and reactive to light  Cardiovascular:      Rate and Rhythm: Normal rate and regular rhythm  Pulses: Normal pulses  Heart sounds: Normal heart sounds  No murmur heard  Pulmonary:      Effort: Pulmonary effort is normal  No respiratory distress  Breath sounds: Normal breath sounds  Abdominal:      General: Bowel sounds are normal  There is no distension  Palpations: Abdomen is soft  There is no mass  Tenderness: There is abdominal tenderness (mild )  Musculoskeletal:         General: No swelling or tenderness  Normal range of motion  Cervical back: Normal range of motion and neck supple  No muscular tenderness  Skin:     General: Skin is warm and dry  Capillary Refill: Capillary refill takes less than 2 seconds  Findings: No erythema or rash  Neurological:      General: No focal deficit present  Mental Status: She is alert and oriented to person, place, and time  Mental status is at baseline  Psychiatric:         Mood and Affect: Mood normal          Behavior: Behavior normal          Thought Content:  Thought content normal          Judgment: Judgment normal          Additional Data:     Labs:    Results from last 7 days   Lab Units 03/29/22  0334 03/28/22  0324 03/28/22  0324   WBC Thousand/uL 3 90*   < > 6 51   HEMOGLOBIN g/dL 7 7*   < > 8 6*   HEMATOCRIT % 20 8*   < > 23 5*   PLATELETS Thousands/uL 228   < > 289   NEUTROS PCT %  --   --  60   LYMPHS PCT %  --   --  25 MONOS PCT %  --   --  10   EOS PCT %  --   --  3    < > = values in this interval not displayed  Results from last 7 days   Lab Units 03/29/22  0334   SODIUM mmol/L 125*   POTASSIUM mmol/L 3 0*   CHLORIDE mmol/L 91*   CO2 mmol/L 23   BUN mg/dL 16   CREATININE mg/dL 1 52*   CALCIUM mg/dL 8 7   ALK PHOS U/L 131*   ALT U/L 21   AST U/L 31                   * I Have Reviewed All Lab Data Listed Above  * Additional Pertinent Lab Tests Reviewed:  Param 66 Admission  Reviewed    Imaging:  Imaging Reports Reviewed Today Include: CT a/p     Recent Cultures (last 7 days):     Results from last 7 days   Lab Units 03/28/22  0448   URINE CULTURE  >100,000 cfu/ml Gram Negative Ephraim Enteric Like*       Last 24 Hours Medication List:   Current Facility-Administered Medications   Medication Dose Route Frequency Provider Last Rate    acetaminophen  650 mg Oral Q6H PRN Adriana Rueda PA-C      busPIRone  10 mg Oral Daily Flaquito Santos PA-C      cefTRIAXone  1,000 mg Intravenous Q24H MAULIK GarciaC 1,000 mg (03/29/22 0557)    diphenhydrAMINE  25 mg Oral Q6H PRN Adriana Rueda PA-C      folic acid  1 mg Oral Daily LETTY Montiel      guaiFENesin  200 mg Oral Q4H PRN Jorge Kapoor PA-C      heparin (porcine)  5,000 Units Subcutaneous [facility] Adriana Rueda PA-C      Lidocaine Viscous HCl  15 mL Swish & Spit 4x Daily PRN LETTY Mendoza      metoprolol succinate  100 mg Oral Daily Adriana Rueda PA-C      multi-electrolyte  150 mL/hr Intravenous Continuous Nelly Bond PA-C 150 mL/hr (03/29/22 1606)    ondansetron  4 mg Intravenous Q6H PRN Adriana Rueda PA-C      pantoprazole  40 mg Intravenous Q24H [address] [place], [place]      potassium chloride  40 mEq Oral Q3H Nelly Bond PA-C      sodium chloride 0 9 % with KCl 20 mEq/L  125 mL/hr Intravenous Neeraj Andino, DO          Today, Patient Was Seen By: Shavon Frye Margarette Ernandez    ** Please Note: Dictation voice to text software may have been used in the creation of this document   **

## 2022-03-29 NOTE — PROGRESS NOTES
Progress Note - Nephrology   Masoud Wild 63 y o  female MRN: [id number]  Unit/Bed#: -01 Encounter: [id number]    Assessment and Plan    1  Pre-renal acute kidney injury, present on admission, superimposed on chronic kidney disease  · Fractional excretion of sodium 0 53%  · Hold home valsartan-HCTZ and ibuprofen  · Renal function is stable around a serum creatinine of 1 52 mg/dL on 03/29/2022  · Patient continues with unchanged diarrhea  Continue volume expansion  Given need for large volume, will discontinue isotonic saline in favor of balance solution, Plasmalyte at 150 mL/min  · Monitor volume status  She has no lower extremity edema no history of CHF  No echocardiogram available for review  Trend renal function  Continue to provide supportive care  Optimize hemodynamics  Maintain mean arterial pressure greater than 65 mmHg  Renally dose medications  Avoid nephrotoxins  Please discuss with renal any diuretics or possible nephrotoxic agents, such as NSAIDs or IV contrast  Recommend avoidance of PPIs in favor of H2 blocker, if appropriate for clinical scenario  Monitor for urinary retention- strict I&Os, record bladder scan PVRs and follow urinary retention protocol  2  Chronic kidney disease, stage 3 with baseline creatinine 1 2 mg/dL   3  Hyponatremia  · Presenting serum sodium 120 mmol/L on 03/28/2022  Improved to 125 mmol/L on 03/29/2022  · Urine sodium 23, urine osmolality 139 mmol/kg  Serum osmolality 256 mmol/kg  · Fractional excretion of sodium 0 53%  · Suspect etiology hypovolemic in setting of GI losses  · Avoid thiazide diuretics  · Continue isotonic saline  4  Acidosis  · Currently at goal   Consider further oral or IV repletion, if indicated  Change to balance IV fluids  5  Hypokalemia  · Provide potassium chloride 40 mEq by mouth 3 times daily  Potassium containing IV fluids  Consider additional potassium rider, as indicated    6  Hypomagnesemia  · Magnesium sulfate 1 g IV over 2 hours  7  Acute cystitis    · Renally dose antibiotics, as indicated  Avoid nephrotoxins  Management per primary  8  Hypertension in the setting of chronic kidney disease  · At goal on metoprolol  Continue to hold home valsartan-hydrochlorothiazide  Follow up reason for today's visit: BALJINDER / electrolyte derangements     Acute kidney injury [facility])    Patient Active Problem List   Diagnosis    Class 1 obesity due to excess calories without serious comorbidity with body mass index (BMI) of 33 0 to 33 9 in adult    Essential hypertension    Anxiety and depression    Pap smear for cervical cancer screening    Postmenopausal bleeding    Vaginal atrophy    At risk for loss of bone density    Dyspareunia, female    S/P gastric bypass    History of myocardial infarction    Arthritis of lumbar spine    Acute kidney injury ([address] )    Hyponatremia    Hypokalemia    Hypomagnesemia    High anion gap metabolic acidosis    Gastroenteritis    Painful tongue    Acute cystitis without hematuria    Macrocytic anemia         Subjective:   Reports continued diarrhea of a stable amount  Denies further complaints  Denies poor p o  intake  A complete 10 point review of systems was performed and is otherwise negative  Spoke with patient and spouse at bedside  Objective:     Vitals: Blood pressure 125/58, pulse 85, temperature 98 2 °F (36 8 °C), resp  rate 20, height 5' (1 524 m), weight 73 9 kg (162 lb 13 oz), SpO2 98 %  ,Body mass index is 31 8 kg/m²  Weight (last 2 days)     Date/Time Weight    03/29/22 02:18:01 73 9 (162 81)    03/28/22 0259 79 4 (175)            Intake/Output Summary (Last 24 hours) at 3/29/2022 1213  Last data filed at 3/29/2022 0557  Gross per 24 hour   Intake 50 ml   Output --   Net 50 ml     I/O last 3 completed shifts:   In: 1239 2 [IV Piggyback:1239 2]  Out: -          Physical Exam: /58   Pulse 85   Temp 98 2 °F (36 8 °C)   Resp 20   Ht 5' (1 524 m) Wt 73 9 kg (162 lb 13 oz)   SpO2 98%   BMI 31 80 kg/m²     General Appearance:    No acute distress  Cooperative  Appears stated age  Obese appearing  Head:    Normocephalic  Atraumatic  Normal jaw occlusion  Eyes:    Lids, conjunctiva normal  No scleral icterus  Ears:    Normal external ears  Nose:   Nares normal  No drainage  Mouth:   Lips, tongue normal  Mucosa normal  Phonation normal    Neck:   Supple  Symmetrical    Back:     Symmetric  No CVA tenderness  Lungs:     Normal respiratory effort  Clear to auscultation bilaterally  Chest wall:    No tenderness or deformity  Heart:    Regular rate and rhythm  Normal S1 and S2  No murmur  No JVD  No edema  Abdomen:     Soft  Non-tender  Bowel sounds active  Genitourinary:   No Holly catheter present  Extremities:   Extremities normal  Atraumatic  No cyanosis  Skin:   Warm and dry  No pallor, jaundice, rash, ecchymoses  Neurologic:   Alert and oriented to person, place, time  No focal deficit  Lab, Imaging and other studies: I have personally reviewed pertinent labs  CBC:   Lab Results   Component Value Date    WBC 3 90 (L) 03/29/2022    HGB 7 7 (L) 03/29/2022    HCT 20 8 (L) 03/29/2022     (H) 03/29/2022     03/29/2022    MCH 40 3 (H) 03/29/2022    MCHC 37 0 03/29/2022    RDW 12 7 03/29/2022    MPV 8 9 03/29/2022     CMP:   Lab Results   Component Value Date    K 3 0 (L) 03/29/2022    CL 91 (L) 03/29/2022    CO2 23 03/29/2022    BUN 16 03/29/2022    CREATININE 1 52 (H) 03/29/2022    CALCIUM 8 7 03/29/2022    AST 31 03/29/2022    ALT 21 03/29/2022    ALKPHOS 131 (H) 03/29/2022    EGFR 37 03/29/2022           Results from last 7 days   Lab Units 03/29/22  0334 03/29/22  0157 03/28/22  1905 03/28/22  0906 03/28/22  0324   POTASSIUM mmol/L 3 0* 3 1* 3 2*   < > 3 2*   CHLORIDE mmol/L 91* 91* 91*   < > 84*   CO2 mmol/L 23 20* 20*   < > 19*   BUN mg/dL 16 17 17   < > 22   CREATININE mg/dL 1 52* 1 47* 1 51*   < > 1 73* CALCIUM mg/dL 8 7 8 8 8 2*   < > 8 8   ALK PHOS U/L 131*  --   --   --  157*   ALT U/L 21  --   --   --  31   AST U/L 31  --   --   --  55*    < > = values in this interval not displayed  Phosphorus:   Lab Results   Component Value Date    PHOS 2 4 (L) 03/29/2022     Magnesium:   Lab Results   Component Value Date    MG 1 9 03/29/2022     Urinalysis: No results found for: Graydon Keshawn, SPECGRAV, PHUR, LEUKOCYTESUR, NITRITE, PROTEINUA, GLUCOSEU, KETONESU, BILIRUBINUR, BLOODU  Ionized Calcium: No results found for: CAION  Coagulation: No results found for: PT, INR, APTT  Troponin: No results found for: TROPONINI  ABG: No results found for: PHART, LQR6DST, PO2ART, VLY3CXJ, O7NDUYWY, BEART, SOURCE  Radiology review:     IMAGING  Procedure: CT abdomen pelvis wo contrast    Result Date: 3/28/2022  Narrative: CT ABDOMEN AND PELVIS WITHOUT IV CONTRAST INDICATION:   LLQ abdominal pain LUQ abdominal pain 2 weeks persistnet nausea/v/diarrhea, left sided pain  Unable to tolerate PO contrast, hx hives to IV contrast  COMPARISON:  2/2/2022  TECHNIQUE:  CT examination of the abdomen and pelvis was performed without intravenous contrast   Axial, sagittal, and coronal 2D reformatted images were created from the source data and submitted for interpretation  Radiation dose length product (DLP) for this visit:  557 18 mGy-cm   This examination, like all CT scans performed in the [facility], was performed utilizing techniques to minimize radiation dose exposure, including the use of iterative  reconstruction and automated exposure control  Enteric contrast was not administered  FINDINGS: ABDOMEN LOWER CHEST:  No clinically significant abnormality identified in the visualized lower chest  LIVER/BILIARY TREE:  Liver is diffusely decreased in density consistent with fatty change  No CT evidence of suspicious hepatic mass  Normal hepatic contours  No biliary dilatation   GALLBLADDER:  Gallbladder is surgically absent  SPLEEN:  Unremarkable  PANCREAS:  Unremarkable  ADRENAL GLANDS:  Unremarkable  KIDNEYS/URETERS:  Unremarkable  No hydronephrosis  STOMACH AND BOWEL:  Postsurgical changes of prior gastric bypass again noted  No bowel obstruction  Some mural fatty infiltration is noted within right colonic loops likely sequela of remote inflammatory process  There is mild wall thickening at the distal stomach/antrum and proximal duodenum with adjacent edematous/inflammatory stranding  APPENDIX:  No findings to suggest appendicitis  ABDOMINOPELVIC CAVITY:  No ascites  No pneumoperitoneum  No lymphadenopathy  VESSELS:  Unremarkable for patient's age  PELVIS REPRODUCTIVE ORGANS:  Unremarkable for patient's age  URINARY BLADDER:  Circumferential wall thickening likely on the basis of underdistention  ABDOMINAL WALL/INGUINAL REGIONS:  Unremarkable  OSSEOUS STRUCTURES:  No acute fracture or destructive osseous lesion  Redemonstrated compression deformity superior endplate of Z89  Impression: Limited secondary to lack of enteric and IV contrast   Findings as above suspicious for gastritis/duodenitis, i e  peptic ulcer disease, in the proper clinical setting  Correlate clinically and consider follow-up GI consultation  No free air  Redemonstrated diffuse hepatic steatosis  Workstation performed: [id number]     Procedure: XR chest 1 view portable    Result Date: 3/28/2022  Narrative: CHEST INDICATION:   hyponatremia  COMPARISON:  Multiple priors most recently CT 2/2/2022 EXAM PERFORMED/VIEWS:  XR CHEST PORTABLE FINDINGS: Cardiomediastinal silhouette appears unremarkable  The lungs are clear  No pneumothorax or pleural effusion  Osseous structures appear within normal limits for patient age  Impression: No acute cardiopulmonary disease   Workstation performed: [id number]       Current Facility-Administered Medications   Medication Dose Route Frequency    acetaminophen (TYLENOL) tablet 650 mg  650 mg Oral Q6H PRN    busPIRone (BUSPAR) tablet 10 mg  10 mg Oral Daily    cefTRIAXone (ROCEPHIN) IVPB (premix in dextrose) 1,000 mg 50 mL  1,000 mg Intravenous Q24H    diphenhydrAMINE (BENADRYL) tablet 25 mg  25 mg Oral H8D PRN    folic acid (FOLVITE) tablet 1 mg  1 mg Oral Daily    guaiFENesin (ROBITUSSIN) oral solution 200 mg  200 mg Oral Q4H PRN    heparin (porcine) subcutaneous injection 5,000 Units  5,000 Units Subcutaneous Q8H [address]    Lidocaine Viscous HCl (XYLOCAINE) 2 % mucosal solution 15 mL  15 mL Swish & Spit 4x Daily PRN    metoprolol succinate (TOPROL-XL) 24 hr tablet 100 mg  100 mg Oral Daily    ondansetron (ZOFRAN) injection 4 mg  4 mg Intravenous Q6H PRN    pantoprazole (PROTONIX) injection 40 mg  40 mg Intravenous Q24H AJ    potassium chloride (K-DUR,KLOR-CON) CR tablet 40 mEq  40 mEq Oral Q3H    sodium chloride 0 9 % infusion  125 mL/hr Intravenous Continuous    sodium chloride 0 9 % with KCl 20 mEq/L infusion (premix)  125 mL/hr Intravenous Once     Medications Discontinued During This Encounter   Medication Reason    magnesium sulfate 4 g/100 mL IVPB (premix) 4 g     sodium chloride 0 9 % infusion     sodium chloride 0 9 % bolus 500 mL     sodium chloride 0 9 % infusion     busPIRone (BUSPAR) tablet 10 mg     losartan (COZAAR) tablet 100 mg        Miranda Sands PA-C    Portions of the record may have been created with voice recognition software  Occasional wrong word or "sound a like" substitutions may have occurred due to the inherent limitations of voice recognition software  Read the chart carefully and recognize, using context, where substitutions have occurred

## 2022-03-30 PROBLEM — E87.2 HIGH ANION GAP METABOLIC ACIDOSIS: Status: RESOLVED | Noted: 2022-03-28 | Resolved: 2022-03-30

## 2022-03-30 LAB
ANION GAP SERPL CALCULATED.3IONS-SCNC: 8 MMOL/L (ref 4–13)
ANION GAP SERPL CALCULATED.3IONS-SCNC: 9 MMOL/L (ref 4–13)
BACTERIA UR CULT: ABNORMAL
BUN SERPL-MCNC: 11 MG/DL (ref 5–25)
BUN SERPL-MCNC: 12 MG/DL (ref 5–25)
C DIFF TOX GENS STL QL NAA+PROBE: NEGATIVE
CALCIUM SERPL-MCNC: 8.4 MG/DL (ref 8.4–10.2)
CALCIUM SERPL-MCNC: 8.5 MG/DL (ref 8.4–10.2)
CHLORIDE SERPL-SCNC: 100 MMOL/L (ref 96–108)
CHLORIDE SERPL-SCNC: 98 MMOL/L (ref 96–108)
CO2 SERPL-SCNC: 23 MMOL/L (ref 21–32)
CO2 SERPL-SCNC: 24 MMOL/L (ref 21–32)
CREAT SERPL-MCNC: 1.37 MG/DL (ref 0.6–1.3)
CREAT SERPL-MCNC: 1.39 MG/DL (ref 0.6–1.3)
ERYTHROCYTE [DISTWIDTH] IN BLOOD BY AUTOMATED COUNT: 13.2 % (ref 11.6–15.1)
GFR SERPL CREATININE-BSD FRML MDRD: 41 ML/MIN/1.73SQ M
GFR SERPL CREATININE-BSD FRML MDRD: 42 ML/MIN/1.73SQ M
GLUCOSE SERPL-MCNC: 110 MG/DL (ref 65–140)
GLUCOSE SERPL-MCNC: 95 MG/DL (ref 65–140)
HCT VFR BLD AUTO: 21.1 % (ref 34.8–46.1)
HGB BLD-MCNC: 7.6 G/DL (ref 11.5–15.4)
MAGNESIUM SERPL-MCNC: 2.2 MG/DL (ref 1.9–2.7)
MCH RBC QN AUTO: 40.2 PG (ref 26.8–34.3)
MCHC RBC AUTO-ENTMCNC: 36 G/DL (ref 31.4–37.4)
MCV RBC AUTO: 112 FL (ref 82–98)
PLATELET # BLD AUTO: 218 THOUSANDS/UL (ref 149–390)
PMV BLD AUTO: 8.8 FL (ref 8.9–12.7)
POTASSIUM SERPL-SCNC: 4.3 MMOL/L (ref 3.5–5.3)
POTASSIUM SERPL-SCNC: 4.5 MMOL/L (ref 3.5–5.3)
RBC # BLD AUTO: 1.89 MILLION/UL (ref 3.81–5.12)
SODIUM SERPL-SCNC: 129 MMOL/L (ref 135–147)
SODIUM SERPL-SCNC: 133 MMOL/L (ref 135–147)
WBC # BLD AUTO: 4.32 THOUSAND/UL (ref 4.31–10.16)

## 2022-03-30 PROCEDURE — 83735 ASSAY OF MAGNESIUM: CPT | Performed by: INTERNAL MEDICINE

## 2022-03-30 PROCEDURE — 99232 SBSQ HOSP IP/OBS MODERATE 35: CPT | Performed by: NURSE PRACTITIONER

## 2022-03-30 PROCEDURE — 85027 COMPLETE CBC AUTOMATED: CPT | Performed by: NURSE PRACTITIONER

## 2022-03-30 PROCEDURE — 80048 BASIC METABOLIC PNL TOTAL CA: CPT | Performed by: PHYSICIAN ASSISTANT

## 2022-03-30 PROCEDURE — C9113 INJ PANTOPRAZOLE SODIUM, VIA: HCPCS | Performed by: FAMILY MEDICINE

## 2022-03-30 PROCEDURE — 99232 SBSQ HOSP IP/OBS MODERATE 35: CPT | Performed by: PHYSICIAN ASSISTANT

## 2022-03-30 RX ORDER — PANTOPRAZOLE SODIUM 40 MG/1
40 TABLET, DELAYED RELEASE ORAL
Status: DISCONTINUED | OUTPATIENT
Start: 2022-03-31 | End: 2022-04-01

## 2022-03-30 RX ORDER — SACCHAROMYCES BOULARDII 250 MG
250 CAPSULE ORAL 2 TIMES DAILY
Status: DISCONTINUED | OUTPATIENT
Start: 2022-03-30 | End: 2022-04-05 | Stop reason: HOSPADM

## 2022-03-30 RX ORDER — LOPERAMIDE HYDROCHLORIDE 2 MG/1
2 CAPSULE ORAL 3 TIMES DAILY PRN
Status: DISCONTINUED | OUTPATIENT
Start: 2022-03-30 | End: 2022-04-05 | Stop reason: HOSPADM

## 2022-03-30 RX ADMIN — BUSPIRONE HYDROCHLORIDE 10 MG: 5 TABLET ORAL at 08:23

## 2022-03-30 RX ADMIN — BUSPIRONE HYDROCHLORIDE 10 MG: 5 TABLET ORAL at 17:45

## 2022-03-30 RX ADMIN — METOPROLOL SUCCINATE 100 MG: 50 TABLET, EXTENDED RELEASE ORAL at 08:24

## 2022-03-30 RX ADMIN — GUAIFENESIN 200 MG: 200 SOLUTION ORAL at 22:08

## 2022-03-30 RX ADMIN — HEPARIN SODIUM 5000 UNITS: 5000 INJECTION INTRAVENOUS; SUBCUTANEOUS at 05:33

## 2022-03-30 RX ADMIN — CEFTRIAXONE 1000 MG: 1 INJECTION, SOLUTION INTRAVENOUS at 05:33

## 2022-03-30 RX ADMIN — SODIUM CHLORIDE, SODIUM GLUCONATE, SODIUM ACETATE, POTASSIUM CHLORIDE, MAGNESIUM CHLORIDE, SODIUM PHOSPHATE, DIBASIC, AND POTASSIUM PHOSPHATE 100 ML/HR: .53; .5; .37; .037; .03; .012; .00082 INJECTION, SOLUTION INTRAVENOUS at 23:49

## 2022-03-30 RX ADMIN — HEPARIN SODIUM 5000 UNITS: 5000 INJECTION INTRAVENOUS; SUBCUTANEOUS at 13:33

## 2022-03-30 RX ADMIN — FOLIC ACID 1 MG: 1 TABLET ORAL at 08:23

## 2022-03-30 RX ADMIN — GUAIFENESIN 200 MG: 200 SOLUTION ORAL at 05:35

## 2022-03-30 RX ADMIN — PANTOPRAZOLE SODIUM 40 MG: 40 INJECTION, POWDER, LYOPHILIZED, FOR SOLUTION INTRAVENOUS at 08:24

## 2022-03-30 RX ADMIN — DIPHENHYDRAMINE HCL 25 MG: 25 TABLET ORAL at 23:49

## 2022-03-30 RX ADMIN — Medication 250 MG: at 17:45

## 2022-03-30 RX ADMIN — HEPARIN SODIUM 5000 UNITS: 5000 INJECTION INTRAVENOUS; SUBCUTANEOUS at 21:59

## 2022-03-30 RX ADMIN — LOPERAMIDE HYDROCHLORIDE 2 MG: 2 CAPSULE ORAL at 22:08

## 2022-03-30 NOTE — PROGRESS NOTES
Progress Note - Nephrology   Hillary Guy 63 y o  female MRN: [id number]  Unit/Bed#: -01 Encounter: [id number]    Assessment and Plan    1  Pre-renal acute kidney injury, present on admission, superimposed on chronic kidney disease  ? Fractional excretion of sodium 0 53%  ? Continue to hold home valsartan-HCTZ and ibuprofen  Complete avoidance of NSAIDs going forward  ? Renal function is approaching baseline around 1 37 mg/dL on 03/30/2022  ? Will continue volume expansion but reduce rate of Plasmalyte 100 mL/hr given that patient continues with diarrhea that has not improved  If patient continues eating and drinking well and GI losses reduce, can discontinue volume expansion  If renal function remains stable and GI losses are reduced and she can maintain her oral intake, she will be stable for discharge from a renal standpoint  Trend renal function  Continue to provide supportive care  Optimize hemodynamics  Maintain mean arterial pressure greater than 65 mmHg  Renally dose medications  Avoid nephrotoxins  Please discuss with renal any diuretics or possible nephrotoxic agents, such as NSAIDs or IV contrast  Recommend avoidance of PPIs in favor of H2 blocker, if appropriate for clinical scenario  Monitor for urinary retention- strict I&Os, record bladder scan PVRs and follow urinary retention protocol  2  Chronic kidney disease, stage 3 with baseline creatinine 1 2 mg/dL   3  Hyponatremia  ? Presenting serum sodium 120 mmol/L on 03/28/2022  Improved to 125 mmol/L on 03/29/2022   ? Urine sodium 23, urine osmolality 139 mmol/kg  Serum osmolality 256 mmol/kg  ? Fractional excretion of sodium 0 53%  ? Suspect etiology hypovolemic in setting of GI losses  ? Avoid thiazide diuretics  ? Improving with isotonic volume expansion  Will continue Plasmalyte at a reduced rate  4  Acidosis- resolved  5  Hypokalemia  ? Improved   Continue to monitor and replete for magnesium goal 2 0 mg/dL and potassium 4 0 millimole per L   6  Hypomagnesemia  ? S/p magnesium sulfate 1 g IV over 2 hours  Continue to monitor and replete for magnesium goal 2 0 mg/dL and potassium 4 0 millimole per L   7  Acute cystitis    ? Ceftriaxone per primary  8  Hypertension in the setting of chronic kidney disease  · Blood pressures are acceptable  Continue metoprolol succinate 100 mg once daily  Follow up reason for today's visit:     Acute kidney injury [facility])    Patient Active Problem List   Diagnosis    Class 1 obesity due to excess calories without serious comorbidity with body mass index (BMI) of 33 0 to 33 9 in adult    Essential hypertension    Anxiety and depression    Pap smear for cervical cancer screening    Postmenopausal bleeding    Vaginal atrophy    At risk for loss of bone density    Dyspareunia, female    S/P gastric bypass    History of myocardial infarction    Arthritis of lumbar spine    Acute kidney injury ([address] )    Hyponatremia    Hypokalemia    Hypomagnesemia    High anion gap metabolic acidosis    Gastroenteritis    Painful tongue    Acute cystitis without hematuria    Macrocytic anemia         Subjective:   Complains of continued diarrhea  Reports that she is eating and drinking well, denies vomiting  A complete 10 point review of systems was performed and is otherwise negative  Objective:     Vitals: Blood pressure 141/67, pulse 77, temperature 98 9 °F (37 2 °C), resp  rate 19, height 5' (1 524 m), weight 73 9 kg (162 lb 13 oz), SpO2 99 %  ,Body mass index is 31 8 kg/m²  Weight (last 2 days)     Date/Time Weight    03/29/22 02:18:01 73 9 (162 81)    03/28/22 0259 79 4 (175)            Intake/Output Summary (Last 24 hours) at 3/30/2022 1220  Last data filed at 3/30/2022 1101  Gross per 24 hour   Intake 240 ml   Output 200 ml   Net 40 ml     I/O last 3 completed shifts:   In: 290 [P O :240; IV Piggyback:50]  Out: -          Physical Exam: /67   Pulse 77   Temp 98 9 °F (37 2 °C)   Resp 19   Ht 5' (1 524 m)   Wt 73 9 kg (162 lb 13 oz)   SpO2 99%   BMI 31 80 kg/m²     General Appearance:    No acute distress  Cooperative  Appears stated age  Head:    Normocephalic  Atraumatic  Normal jaw occlusion  Eyes:    Lids, conjunctiva normal  No scleral icterus  Ears:    Normal external ears  Nose:   Nares normal  No drainage  Mouth:   Lips, tongue normal  Mucosa normal  Phonation normal    Neck:   Supple  Symmetrical    Back:     Symmetric  No CVA tenderness  Lungs:     Normal respiratory effort  Clear to auscultation bilaterally  Chest wall:    No tenderness or deformity  Heart:    Regular rate and rhythm  Normal S1 and S2  No murmur  No JVD  No edema  Abdomen:     Soft  Non-tender  Bowel sounds active  Genitourinary:   No Holly catheter present  Extremities:   Extremities normal  Atraumatic  No cyanosis  Skin:   Warm and dry  No pallor, jaundice, rash, ecchymoses  Neurologic:   Alert and oriented to person, place, time  No focal deficit  Lab, Imaging and other studies: I have personally reviewed pertinent labs  CBC:   Lab Results   Component Value Date    WBC 4 32 03/30/2022    HGB 7 6 (L) 03/30/2022    HCT 21 1 (L) 03/30/2022     (H) 03/30/2022     03/30/2022    MCH 40 2 (H) 03/30/2022    MCHC 36 0 03/30/2022    RDW 13 2 03/30/2022    MPV 8 8 (L) 03/30/2022     CMP:   Lab Results   Component Value Date    K 4 3 03/30/2022     03/30/2022    CO2 24 03/30/2022    BUN 11 03/30/2022    CREATININE 1 37 (H) 03/30/2022    CALCIUM 8 5 03/30/2022    EGFR 42 03/30/2022           Results from last 7 days   Lab Units 03/30/22  0732 03/29/22  2345 03/29/22  0334 03/28/22  0906 03/28/22  0324   POTASSIUM mmol/L 4 3 4 5 3 0*   < > 3 2*   CHLORIDE mmol/L 100 98 91*   < > 84*   CO2 mmol/L 24 23 23   < > 19*   BUN mg/dL 11 12 16   < > 22   CREATININE mg/dL 1 37* 1 39* 1 52*   < > 1 73*   CALCIUM mg/dL 8 5 8 4 8 7   < > 8 8   ALK PHOS U/L  --   --  131* --  157*   ALT U/L  --   --  21  --  31   AST U/L  --   --  31  --  55*    < > = values in this interval not displayed  Phosphorus: No results found for: PHOS  Magnesium:   Lab Results   Component Value Date    MG 2 2 03/30/2022     Urinalysis: No results found for: Shelda Chock, SPECGRAV, PHUR, LEUKOCYTESUR, NITRITE, PROTEINUA, GLUCOSEU, KETONESU, BILIRUBINUR, BLOODU  Ionized Calcium: No results found for: CAION  Coagulation: No results found for: PT, INR, APTT  Troponin: No results found for: TROPONINI  ABG: No results found for: PHART, MCS7HEH, PO2ART, HRC4MMS, N9RMYWON, BEART, SOURCE  Radiology review:     IMAGING  Procedure: CT abdomen pelvis wo contrast    Result Date: 3/28/2022  Narrative: CT ABDOMEN AND PELVIS WITHOUT IV CONTRAST INDICATION:   LLQ abdominal pain LUQ abdominal pain 2 weeks persistnet nausea/v/diarrhea, left sided pain  Unable to tolerate PO contrast, hx hives to IV contrast  COMPARISON:  2/2/2022  TECHNIQUE:  CT examination of the abdomen and pelvis was performed without intravenous contrast   Axial, sagittal, and coronal 2D reformatted images were created from the source data and submitted for interpretation  Radiation dose length product (DLP) for this visit:  557 18 mGy-cm   This examination, like all CT scans performed in the [place], was performed utilizing techniques to minimize radiation dose exposure, including the use of iterative  reconstruction and automated exposure control  Enteric contrast was not administered  FINDINGS: ABDOMEN LOWER CHEST:  No clinically significant abnormality identified in the visualized lower chest  LIVER/BILIARY TREE:  Liver is diffusely decreased in density consistent with fatty change  No CT evidence of suspicious hepatic mass  Normal hepatic contours  No biliary dilatation  GALLBLADDER:  Gallbladder is surgically absent  SPLEEN:  Unremarkable  PANCREAS:  Unremarkable  ADRENAL GLANDS:  Unremarkable   KIDNEYS/URETERS: Unremarkable  No hydronephrosis  STOMACH AND BOWEL:  Postsurgical changes of prior gastric bypass again noted  No bowel obstruction  Some mural fatty infiltration is noted within right colonic loops likely sequela of remote inflammatory process  There is mild wall thickening at the distal stomach/antrum and proximal duodenum with adjacent edematous/inflammatory stranding  APPENDIX:  No findings to suggest appendicitis  ABDOMINOPELVIC CAVITY:  No ascites  No pneumoperitoneum  No lymphadenopathy  VESSELS:  Unremarkable for patient's age  PELVIS REPRODUCTIVE ORGANS:  Unremarkable for patient's age  URINARY BLADDER:  Circumferential wall thickening likely on the basis of underdistention  ABDOMINAL WALL/INGUINAL REGIONS:  Unremarkable  OSSEOUS STRUCTURES:  No acute fracture or destructive osseous lesion  Redemonstrated compression deformity superior endplate of R36  Impression: Limited secondary to lack of enteric and IV contrast   Findings as above suspicious for gastritis/duodenitis, i e  peptic ulcer disease, in the proper clinical setting  Correlate clinically and consider follow-up GI consultation  No free air  Redemonstrated diffuse hepatic steatosis  Workstation performed: [id number]     Procedure: XR chest 1 view portable    Result Date: 3/28/2022  Narrative: CHEST INDICATION:   hyponatremia  COMPARISON:  Multiple priors most recently CT 2/2/2022 EXAM PERFORMED/VIEWS:  XR CHEST PORTABLE FINDINGS: Cardiomediastinal silhouette appears unremarkable  The lungs are clear  No pneumothorax or pleural effusion  Osseous structures appear within normal limits for patient age  Impression: No acute cardiopulmonary disease   Workstation performed: [id number]       Current Facility-Administered Medications   Medication Dose Route Frequency    acetaminophen (TYLENOL) tablet 650 mg  650 mg Oral Q6H PRN    busPIRone (BUSPAR) tablet 10 mg  10 mg Oral BID    cefTRIAXone (ROCEPHIN) IVPB (premix in dextrose) 1,000 mg 50 mL  1,000 mg Intravenous Q24H    diphenhydrAMINE (BENADRYL) tablet 25 mg  25 mg Oral X7M PRN    folic acid (FOLVITE) tablet 1 mg  1 mg Oral Daily    guaiFENesin (ROBITUSSIN) oral solution 200 mg  200 mg Oral Q4H PRN    heparin (porcine) subcutaneous injection 5,000 Units  5,000 Units Subcutaneous Q8H [address]    Lidocaine Viscous HCl (XYLOCAINE) 2 % mucosal solution 15 mL  15 mL Swish & Spit 4x Daily PRN    metoprolol succinate (TOPROL-XL) 24 hr tablet 100 mg  100 mg Oral Daily    multi-electrolyte (PLASMALYTE-A/ISOLYTE-S PH 7 4) IV solution  150 mL/hr Intravenous Continuous    ondansetron (ZOFRAN) injection 4 mg  4 mg Intravenous Q6H PRN    pantoprazole (PROTONIX) injection 40 mg  40 mg Intravenous Q24H AJ    sodium chloride 0 9 % with KCl 20 mEq/L infusion (premix)  125 mL/hr Intravenous Once     Medications Discontinued During This Encounter   Medication Reason    magnesium sulfate 4 g/100 mL IVPB (premix) 4 g     sodium chloride 0 9 % infusion     sodium chloride 0 9 % bolus 500 mL     sodium chloride 0 9 % infusion     busPIRone (BUSPAR) tablet 10 mg     losartan (COZAAR) tablet 100 mg     sodium chloride 0 9 % infusion     potassium chloride (K-DUR,KLOR-CON) CR tablet 40 mEq     busPIRone (BUSPAR) tablet 10 mg        Juani Juarez PA-C    Portions of the record may have been created with voice recognition software  Occasional wrong word or "sound a like" substitutions may have occurred due to the inherent limitations of voice recognition software  Read the chart carefully and recognize, using context, where substitutions have occurred

## 2022-03-30 NOTE — ASSESSMENT & PLAN NOTE
· Sodium 120 --> 133  · Suspected hypovolemia hyponatremia with concurrent use of thiazide diuresis with GI losses    · Nephrology input appreciated-continue with IV fluid for volume expansion  · TSH 5 879   · Monitor sodium level closely

## 2022-03-30 NOTE — PLAN OF CARE
VSS, afebrile  A/O x 4 upon assessment  No complaints of pain  Up independently around room  SPO2 above 90% on RA  No new skin issues  Remains free from falls  Makes needs known, uses call light appropriately  Receiving continuous IVF  Complaints of cough, PRN robitussin utilized     Problem: INFECTION - ADULT  Goal: Absence or prevention of progression during hospitalization  Description: INTERVENTIONS:  - Assess and monitor for signs and symptoms of infection  - Monitor lab/diagnostic results  - Monitor all insertion sites, i e  indwelling lines, tubes, and drains  - Monitor endotracheal if appropriate and nasal secretions for changes in amount and color  - Lorraine appropriate cooling/warming therapies per order  - Administer medications as ordered  - Instruct and encourage patient and family to use good hand hygiene technique  - Identify and instruct in appropriate isolation precautions for identified infection/condition  Outcome: Progressing  Goal: Absence of fever/infection during neutropenic period  Description: INTERVENTIONS:  - Monitor WBC    Outcome: Progressing     Problem: DISCHARGE PLANNING  Goal: Discharge to home or other facility with appropriate resources  Description: INTERVENTIONS:  - Identify barriers to discharge w/patient and caregiver  - Arrange for needed discharge resources and transportation as appropriate  - Identify discharge learning needs (meds, wound care, etc )  - Arrange for interpretive services to assist at discharge as needed  - Refer to Case Management Department for coordinating discharge planning if the patient needs post-hospital services based on physician/advanced practitioner order or complex needs related to functional status, cognitive ability, or social support system  Outcome: Progressing     Problem: Knowledge Deficit  Goal: Patient/family/caregiver demonstrates understanding of disease process, treatment plan, medications, and discharge instructions  Description: Complete learning assessment and assess knowledge base  Interventions:  - Provide teaching at level of understanding  - Provide teaching via preferred learning methods  Outcome: Progressing     Problem: Potential for Falls  Goal: Patient will remain free of falls  Description: INTERVENTIONS:  - Educate patient/family on patient safety including physical limitations  - Instruct patient to call for assistance with activity   - Consult OT/PT to assist with strengthening/mobility   - Keep Call bell within reach  - Keep bed low and locked with side rails adjusted as appropriate  - Keep care items and personal belongings within reach  - Initiate and maintain comfort rounds  - Make Fall Risk Sign visible to staff  - Offer Toileting every 2 Hours, in advance of need  - Apply yellow socks and bracelet for high fall risk patients  - Consider moving patient to room near nurses station  Outcome: Progressing     Problem: Nutrition/Hydration-ADULT  Goal: Nutrient/Hydration intake appropriate for improving, restoring or maintaining nutritional needs  Description: Monitor and assess patient's nutrition/hydration status for malnutrition  Collaborate with interdisciplinary team and initiate plan and interventions as ordered  Monitor patient's weight and dietary intake as ordered or per policy  Utilize nutrition screening tool and intervene as necessary  Determine patient's food preferences and provide high-protein, high-caloric foods as appropriate       INTERVENTIONS:  - Monitor oral intake, urinary output, labs, and treatment plans  - Assess nutrition and hydration status and recommend course of action  - Evaluate amount of meals eaten  - Assist patient with eating if necessary   - Allow adequate time for meals  - Recommend/ encourage appropriate diets, oral nutritional supplements, and vitamin/mineral supplements  - Order, calculate, and assess calorie counts as needed  - Recommend, monitor, and adjust tube feedings and TPN/PPN based on assessed needs  - Assess need for intravenous fluids  - Provide specific nutrition/hydration education as appropriate  - Include patient/family/caregiver in decisions related to nutrition  Outcome: Progressing

## 2022-03-30 NOTE — ASSESSMENT & PLAN NOTE
· Presents with generalized weakness and left upper quadrant abdominal pain in the setting of recent viral illness, history of gastric bypass in 1998  · She has not been able to keep down any oral intake for 5-6 days  · COVID negative  · Mild transaminitis-- improving  · CT abdomen pelvis: Findings as above suspicious for gastritis/duodenitis, i e  peptic ulcer disease, in the proper clinical setting  Hepatic steatosis re-demonstrated  · GI input appreciated- suspected infectious in etiology  · Stool cultures- negative; C difficile-negative  · 3/30-- the patient continues to have 6-7 episodes of nonbloody diarrhea since this a m ; she however has not been compliant with diet  The patient had salad, potato chips, Jerardo Tiny for lunch  Diet changed to GI low-fat, low residue  I discussed diet option with the patient in details  Will consult dietitian  Started on probiotics

## 2022-03-30 NOTE — PROGRESS NOTES
[facility]  68   Progress Note - Emily Posada 1960, 63 y o  female MRN: [id number]  Unit/Bed#: -01 Encounter: [id number]  Primary Care Provider: LETTY Colin   Date and time admitted to hospital: 3/28/2022  2:52 AM    * Acute kidney injury [facility])  Assessment & Plan  · Presents with generalized weakness and abdominal pain  · Creatinine 1 73 on admission, recent baseline appears to be around 1 1 mg/dL  Suspect prerenal due to GI losses  · Received 1 L normal saline in the ER; continue with IV fluid  · Nephrology input appreciated  · Daily metabolic panel and trend creatinine  · Avoid nephrotoxins and hypotension      Macrocytic anemia  Assessment & Plan  · Hemoglobin 8 6, unclear baseline  Was 11 6 in 2020  ·    · Folate 2 1- start a supplement; vitamin B12- normal  · Iron panel results appreciated  · No active overt bleeding; hold off on transfusion unless hemoglobin less than 7    Acute cystitis without hematuria  Assessment & Plan  · Urinalysis positive for leukocyte esterase, leukocytes, and bacteria  · Treated with ceftriaxone in ER-- completed 3 day course  · Urine culture- E coli    Painful tongue  Assessment & Plan  · Complains of right lateral tongue burning  · Viscous lidocaine swish and spit-- improving      Gastroenteritis  Assessment & Plan  · Presents with generalized weakness and left upper quadrant abdominal pain in the setting of recent viral illness, history of gastric bypass in 1998  · She has not been able to keep down any oral intake for 5-6 days  · COVID negative  · Mild transaminitis-- improving  · CT abdomen pelvis: Findings as above suspicious for gastritis/duodenitis, i e  peptic ulcer disease, in the proper clinical setting  Hepatic steatosis re-demonstrated     · GI input appreciated- suspected infectious in etiology  · Stool cultures- negative; C difficile-negative  · 3/30-- the patient continues to have 6-7 episodes of nonbloody diarrhea since this a m ; she however has not been compliant with diet  The patient had salad, potato chips, [facility] for lunch  Diet changed to GI low-fat, low residue  I discussed diet option with the patient in details  Will consult dietitian  Started on probiotics  Hypomagnesemia  Assessment & Plan  · Continue to replete and monitor     Hypokalemia  Assessment & Plan  · Continue to replete and monitor     Hyponatremia  Assessment & Plan  · Sodium 120 --> 133  · Suspected hypovolemia hyponatremia with concurrent use of thiazide diuresis with GI losses  · Nephrology input appreciated-continue with IV fluid for volume expansion  · TSH 5 879   · Monitor sodium level closely    Essential hypertension  Assessment & Plan  · BP reviewed  · Continue metoprolol  · Hold ACE-inhibitor for now due to BALJINDER  · Monitor blood pressure     High anion gap metabolic acidosis-resolved as of 3/30/2022  Assessment & Plan  · Anion gap is 17 CO2 19  · In the setting of BALJINDER, GI losses  · This has resolved with IV fluid       VTE Prophylaxis:  Heparin    Patient Centered Rounds: I have performed bedside rounds with nursing staff today  Discussions with Specialists or Other Care Team Provider:  Nephrology  Education and Discussions with Family / Patient:  Patient and  at bedside    Current Length of Stay: 2 day(s)    Current Patient Status: Inpatient   Certification Statement: The patient will continue to require additional inpatient hospital stay due to Acute kidney injury    Discharge Plan:  Pending hospital course  Hopeful discharge within next 24 hours pending renal function  Code Status: Level 1 - Full Code    Subjective:   Feeling about the same  Complaining of generalized weakness  The patient reports 6-7 bowel movements since she woke up this morning  Nonbloody diarrhea  She denies any nausea, vomiting, abdominal pain    She complains of mouth pain with nursing staff but she did not state any pain with me   The patient is able to tolerate diet  Objective:     Vitals:   Temp (24hrs), Av 3 °F (37 4 °C), Min:98 9 °F (37 2 °C), Max:99 9 °F (37 7 °C)    Temp:  [98 9 °F (37 2 °C)-99 9 °F (37 7 °C)] 99 5 °F (37 5 °C)  HR:  [77-88] 88  Resp:  [18-20] 19  BP: (127-141)/(49-70) 141/70  SpO2:  [98 %-100 %] 100 %  Body mass index is 31 8 kg/m²  Input and Output Summary (last 24 hours): Intake/Output Summary (Last 24 hours) at 3/30/2022 1728  Last data filed at 3/30/2022 1101  Gross per 24 hour   Intake --   Output 200 ml   Net -200 ml       Physical Exam:   Physical Exam  Vitals and nursing note reviewed  Constitutional:       General: She is not in acute distress  Appearance: Normal appearance  HENT:      Head: Normocephalic and atraumatic  Right Ear: External ear normal       Left Ear: External ear normal       Nose: Nose normal  No rhinorrhea  Mouth/Throat:      Mouth: Mucous membranes are moist       Pharynx: Oropharynx is clear  Eyes:      General:         Right eye: No discharge  Left eye: No discharge  Pupils: Pupils are equal, round, and reactive to light  Cardiovascular:      Rate and Rhythm: Normal rate and regular rhythm  Pulses: Normal pulses  Heart sounds: Normal heart sounds  No murmur heard  Pulmonary:      Effort: Pulmonary effort is normal  No respiratory distress  Breath sounds: Normal breath sounds  Abdominal:      General: Bowel sounds are normal  There is no distension  Palpations: Abdomen is soft  There is no mass  Tenderness: There is no abdominal tenderness  Musculoskeletal:         General: No swelling or tenderness  Normal range of motion  Cervical back: Normal range of motion and neck supple  No muscular tenderness  Skin:     General: Skin is warm and dry  Capillary Refill: Capillary refill takes less than 2 seconds  Findings: No erythema or rash     Neurological:      General: No focal deficit present  Mental Status: She is alert and oriented to person, place, and time  Mental status is at baseline  Psychiatric:         Mood and Affect: Mood normal          Behavior: Behavior normal          Thought Content: Thought content normal          Judgment: Judgment normal          Additional Data:     Labs:    Results from last 7 days   Lab Units 03/30/22  0732 03/29/22  0334 03/28/22  0324   WBC Thousand/uL 4 32   < > 6 51   HEMOGLOBIN g/dL 7 6*   < > 8 6*   HEMATOCRIT % 21 1*   < > 23 5*   PLATELETS Thousands/uL 218   < > 289   NEUTROS PCT %  --   --  60   LYMPHS PCT %  --   --  25   MONOS PCT %  --   --  10   EOS PCT %  --   --  3    < > = values in this interval not displayed  Results from last 7 days   Lab Units 03/30/22  0732 03/29/22  2345 03/29/22  0334   SODIUM mmol/L 133*   < > 125*   POTASSIUM mmol/L 4 3   < > 3 0*   CHLORIDE mmol/L 100   < > 91*   CO2 mmol/L 24   < > 23   BUN mg/dL 11   < > 16   CREATININE mg/dL 1 37*   < > 1 52*   CALCIUM mg/dL 8 5   < > 8 7   ALK PHOS U/L  --   --  131*   ALT U/L  --   --  21   AST U/L  --   --  31    < > = values in this interval not displayed  * I Have Reviewed All Lab Data Listed Above  * Additional Pertinent Lab Tests Reviewed:  [facility] 68 Admission  Reviewed    Imaging:  Imaging Reports Reviewed Today Include: n/a     Recent Cultures (last 7 days):     Results from last 7 days   Lab Units 03/29/22  0609 03/28/22  0448   URINE CULTURE   --  >100,000 cfu/ml Escherichia coli*   C DIFF TOXIN B BY PCR  Negative  --        Last 24 Hours Medication List:   Current Facility-Administered Medications   Medication Dose Route Frequency Provider Last Rate    acetaminophen  650 mg Oral Q6H PRN Dawna Henderson PA-C      busPIRone  10 mg Oral BID Medardo Hunt PA-C      diphenhydrAMINE  25 mg Oral Q6H PRN Dawna TRACY Henderson      folic acid  1 mg Oral Daily LETTY Lawson      guaiFENesin  200 mg Oral Q4H PRN Akash Ayers PA-C      heparin (porcine)  5,000 Units Subcutaneous [address] [facility] Vinod Johnson [place]      Lidocaine Viscous HCl  15 mL Swish & Spit 4x Daily PRN Henry SommerserLETTY harrison      loperamide  2 mg Oral TID PRN LETTY Zuleta      metoprolol succinate  100 mg Oral Daily Vinod Johnson PA-C      multi-electrolyte  100 mL/hr Intravenous Continuous Claudell Alatisha Bond PA-C 100 mL/hr (03/30/22 1251)    ondansetron  4 mg Intravenous Q6H PRN Vinod Johnson PA-C      pantoprazole  40 mg Intravenous Q24H [facility] [place], [place]      saccharomyces boulardii  250 mg Oral BID LETTY Zuleta      sodium chloride 0 9 % with KCl 20 mEq/L  125 mL/hr Intravenous Once Quin Ricks DO          Today, Patient Was Seen By: LETTY Zuleta    ** Please Note: Dictation voice to text software may have been used in the creation of this document   **

## 2022-03-30 NOTE — ASSESSMENT & PLAN NOTE
· Urinalysis positive for leukocyte esterase, leukocytes, and bacteria  · Treated with ceftriaxone in ER-- completed 3 day course  · Urine culture- E coli

## 2022-03-31 ENCOUNTER — APPOINTMENT (INPATIENT)
Dept: NON INVASIVE DIAGNOSTICS | Facility: HOSPITAL | Age: 62
DRG: 249 | End: 2022-03-31
Payer: COMMERCIAL

## 2022-03-31 PROBLEM — R00.1 BRADYCARDIA: Status: ACTIVE | Noted: 2022-03-31

## 2022-03-31 PROBLEM — K12.30 MUCOSITIS: Status: ACTIVE | Noted: 2022-03-31

## 2022-03-31 LAB
ANION GAP SERPL CALCULATED.3IONS-SCNC: 11 MMOL/L (ref 4–13)
AORTIC ROOT: 2.5 CM
APICAL FOUR CHAMBER EJECTION FRACTION: 64 %
ASCENDING AORTA: 2.6 CM (ref 1.92–2.87)
BASOPHILS # BLD AUTO: 0.02 THOUSANDS/ΜL (ref 0–0.1)
BASOPHILS NFR BLD AUTO: 0 % (ref 0–1)
BUN SERPL-MCNC: 9 MG/DL (ref 5–25)
CALCIUM SERPL-MCNC: 8.8 MG/DL (ref 8.4–10.2)
CHLORIDE SERPL-SCNC: 103 MMOL/L (ref 96–108)
CO2 SERPL-SCNC: 23 MMOL/L (ref 21–32)
CREAT SERPL-MCNC: 1.32 MG/DL (ref 0.6–1.3)
CRP SERPL QL: 10.9 MG/L
E WAVE DECELERATION TIME: 255 MS
EOSINOPHIL # BLD AUTO: 0.27 THOUSAND/ΜL (ref 0–0.61)
EOSINOPHIL NFR BLD AUTO: 4 % (ref 0–6)
ERYTHROCYTE [DISTWIDTH] IN BLOOD BY AUTOMATED COUNT: 14.2 % (ref 11.6–15.1)
ERYTHROCYTE [SEDIMENTATION RATE] IN BLOOD: 14 MM/HOUR (ref 0–29)
FRACTIONAL SHORTENING: 40 % (ref 28–44)
GFR SERPL CREATININE-BSD FRML MDRD: 44 ML/MIN/1.73SQ M
GLUCOSE SERPL-MCNC: 124 MG/DL (ref 65–140)
HCT VFR BLD AUTO: 21.9 % (ref 34.8–46.1)
HGB BLD-MCNC: 7.1 G/DL (ref 11.5–15.4)
IMM GRANULOCYTES # BLD AUTO: 0.02 THOUSAND/UL (ref 0–0.2)
IMM GRANULOCYTES NFR BLD AUTO: 0 % (ref 0–2)
INTERVENTRICULAR SEPTUM IN DIASTOLE (PARASTERNAL SHORT AXIS VIEW): 1.2 CM
INTERVENTRICULAR SEPTUM: 1.2 CM (ref 0.51–0.95)
LAAS-AP2: 19.3 CM2
LAAS-AP4: 20.4 CM2
LEFT ATRIUM SIZE: 3.6 CM
LEFT INTERNAL DIMENSION IN SYSTOLE: 2.6 CM (ref 2.49–3.76)
LEFT VENTRICULAR INTERNAL DIMENSION IN DIASTOLE: 4.3 CM (ref 4.06–6.04)
LEFT VENTRICULAR POSTERIOR WALL IN END DIASTOLE: 1.2 CM (ref 0.5–0.94)
LEFT VENTRICULAR STROKE VOLUME: 57 ML
LVSV (TEICH): 57 ML
LYMPHOCYTES # BLD AUTO: 2.02 THOUSANDS/ΜL (ref 0.6–4.47)
LYMPHOCYTES NFR BLD AUTO: 33 % (ref 14–44)
MCH RBC QN AUTO: 40.1 PG (ref 26.8–34.3)
MCHC RBC AUTO-ENTMCNC: 32.4 G/DL (ref 31.4–37.4)
MCV RBC AUTO: 124 FL (ref 82–98)
MONOCYTES # BLD AUTO: 0.9 THOUSAND/ΜL (ref 0.17–1.22)
MONOCYTES NFR BLD AUTO: 15 % (ref 4–12)
MV E'TISSUE VEL-SEP: 13 CM/S
MV PEAK A VEL: 0.6 M/S
MV PEAK E VEL: 82 CM/S
MV STENOSIS PRESSURE HALF TIME: 74 MS
MV VALVE AREA P 1/2 METHOD: 2.97 CM2
NEUTROPHILS # BLD AUTO: 2.89 THOUSANDS/ΜL (ref 1.85–7.62)
NEUTS SEG NFR BLD AUTO: 48 % (ref 43–75)
NRBC BLD AUTO-RTO: 0 /100 WBCS
PLATELET # BLD AUTO: 229 THOUSANDS/UL (ref 149–390)
PMV BLD AUTO: 8.7 FL (ref 8.9–12.7)
POTASSIUM SERPL-SCNC: 4.7 MMOL/L (ref 3.5–5.3)
RBC # BLD AUTO: 1.77 MILLION/UL (ref 3.81–5.12)
RIGHT ATRIUM AREA SYSTOLE A4C: 8.9 CM2
RIGHT VENTRICLE ID DIMENSION: 3.3 CM
SL CV LEFT ATRIUM LENGTH A2C: 4.8 CM
SL CV LV EF: 55
SL CV PED ECHO LEFT VENTRICLE DIASTOLIC VOLUME (MOD BIPLANE) 2D: 82 ML
SL CV PED ECHO LEFT VENTRICLE SYSTOLIC VOLUME (MOD BIPLANE) 2D: 25 ML
SODIUM SERPL-SCNC: 137 MMOL/L (ref 135–147)
TR MAX PG: 31 MMHG
TR PEAK VELOCITY: 2.8 M/S
TRICUSPID VALVE PEAK REGURGITATION VELOCITY: 2.79 M/S
WBC # BLD AUTO: 6.12 THOUSAND/UL (ref 4.31–10.16)
Z-SCORE OF ASCENDING AORTA: 0.85
Z-SCORE OF INTERVENTRICULAR SEPTUM IN END DIASTOLE: 4.12
Z-SCORE OF LEFT VENTRICULAR DIMENSION IN END DIASTOLE: -1.4
Z-SCORE OF LEFT VENTRICULAR DIMENSION IN END SYSTOLE: -1.28
Z-SCORE OF LEFT VENTRICULAR POSTERIOR WALL IN END DIASTOLE: 4.23

## 2022-03-31 PROCEDURE — 86038 ANTINUCLEAR ANTIBODIES: CPT | Performed by: NURSE PRACTITIONER

## 2022-03-31 PROCEDURE — 85652 RBC SED RATE AUTOMATED: CPT | Performed by: NURSE PRACTITIONER

## 2022-03-31 PROCEDURE — 99232 SBSQ HOSP IP/OBS MODERATE 35: CPT | Performed by: NURSE PRACTITIONER

## 2022-03-31 PROCEDURE — 80048 BASIC METABOLIC PNL TOTAL CA: CPT | Performed by: PHYSICIAN ASSISTANT

## 2022-03-31 PROCEDURE — 83520 IMMUNOASSAY QUANT NOS NONAB: CPT | Performed by: NURSE PRACTITIONER

## 2022-03-31 PROCEDURE — 86140 C-REACTIVE PROTEIN: CPT | Performed by: NURSE PRACTITIONER

## 2022-03-31 PROCEDURE — 93306 TTE W/DOPPLER COMPLETE: CPT

## 2022-03-31 PROCEDURE — 93306 TTE W/DOPPLER COMPLETE: CPT | Performed by: INTERNAL MEDICINE

## 2022-03-31 PROCEDURE — 86037 ANCA TITER EACH ANTIBODY: CPT | Performed by: NURSE PRACTITIONER

## 2022-03-31 PROCEDURE — 99232 SBSQ HOSP IP/OBS MODERATE 35: CPT | Performed by: INTERNAL MEDICINE

## 2022-03-31 PROCEDURE — 82652 VIT D 1 25-DIHYDROXY: CPT | Performed by: NURSE PRACTITIONER

## 2022-03-31 PROCEDURE — 85025 COMPLETE CBC W/AUTO DIFF WBC: CPT | Performed by: NURSE PRACTITIONER

## 2022-03-31 RX ORDER — LIDOCAINE HYDROCHLORIDE 20 MG/ML
15 SOLUTION OROPHARYNGEAL 4 TIMES DAILY PRN
Status: DISCONTINUED | OUTPATIENT
Start: 2022-03-31 | End: 2022-04-05 | Stop reason: HOSPADM

## 2022-03-31 RX ORDER — METOPROLOL SUCCINATE 50 MG/1
50 TABLET, EXTENDED RELEASE ORAL DAILY
Status: DISCONTINUED | OUTPATIENT
Start: 2022-04-01 | End: 2022-04-05 | Stop reason: HOSPADM

## 2022-03-31 RX ORDER — FLUTICASONE PROPIONATE 50 MCG
2 SPRAY, SUSPENSION (ML) NASAL DAILY
Status: DISCONTINUED | OUTPATIENT
Start: 2022-03-31 | End: 2022-04-05 | Stop reason: HOSPADM

## 2022-03-31 RX ORDER — BUSPIRONE HYDROCHLORIDE 5 MG/1
10 TABLET ORAL 2 TIMES DAILY
Status: DISCONTINUED | OUTPATIENT
Start: 2022-03-31 | End: 2022-04-05 | Stop reason: HOSPADM

## 2022-03-31 RX ADMIN — FOLIC ACID 1 MG: 1 TABLET ORAL at 10:07

## 2022-03-31 RX ADMIN — LIDOCAINE HYDROCHLORIDE 15 ML: 20 SOLUTION ORAL; TOPICAL at 12:58

## 2022-03-31 RX ADMIN — HEPARIN SODIUM 5000 UNITS: 5000 INJECTION INTRAVENOUS; SUBCUTANEOUS at 06:27

## 2022-03-31 RX ADMIN — GUAIFENESIN 200 MG: 200 SOLUTION ORAL at 17:45

## 2022-03-31 RX ADMIN — Medication 250 MG: at 17:42

## 2022-03-31 RX ADMIN — LOPERAMIDE HYDROCHLORIDE 2 MG: 2 CAPSULE ORAL at 06:27

## 2022-03-31 RX ADMIN — GUAIFENESIN 200 MG: 200 SOLUTION ORAL at 06:27

## 2022-03-31 RX ADMIN — BUSPIRONE HYDROCHLORIDE 10 MG: 5 TABLET ORAL at 10:07

## 2022-03-31 RX ADMIN — FLUTICASONE PROPIONATE 2 SPRAY: 50 SPRAY, METERED NASAL at 14:48

## 2022-03-31 RX ADMIN — METOPROLOL SUCCINATE 100 MG: 50 TABLET, EXTENDED RELEASE ORAL at 10:06

## 2022-03-31 RX ADMIN — Medication 250 MG: at 10:08

## 2022-03-31 RX ADMIN — BUSPIRONE HYDROCHLORIDE 10 MG: 5 TABLET ORAL at 22:01

## 2022-03-31 RX ADMIN — GUAIFENESIN 200 MG: 200 SOLUTION ORAL at 22:01

## 2022-03-31 RX ADMIN — PANTOPRAZOLE SODIUM 40 MG: 40 TABLET, DELAYED RELEASE ORAL at 06:27

## 2022-03-31 RX ADMIN — HEPARIN SODIUM 5000 UNITS: 5000 INJECTION INTRAVENOUS; SUBCUTANEOUS at 22:01

## 2022-03-31 RX ADMIN — HEPARIN SODIUM 5000 UNITS: 5000 INJECTION INTRAVENOUS; SUBCUTANEOUS at 14:49

## 2022-03-31 NOTE — ASSESSMENT & PLAN NOTE
· Obtain EKG  · Monitor on telemetry  · Hemodynamic stable  · Will decrease Toprol to 50 mg daily instead of 100 daily

## 2022-03-31 NOTE — ASSESSMENT & PLAN NOTE
· Sodium 120 --> 133  · Suspected hypovolemia hyponatremia with concurrent use of thiazide diuresis with GI losses    · Nephrology input appreciated-continue with IV fluid for volume expansion  · TSH 5 879   · Monitor sodium level closely; blood work this a m  is pending

## 2022-03-31 NOTE — PROGRESS NOTES
[address] [address]  Progress Note - Masoud Arrant 1960, 63 y o  female MRN: [id number]  Unit/Bed#: -01 Encounter: [id number]  Primary Care Provider: LETTY Dale   Date and time admitted to hospital: 3/28/2022  2:52 AM    * Acute kidney injury [facility])  Assessment & Plan  · Presents with generalized weakness and abdominal pain  · Creatinine 1 73 on admission, recent baseline appears to be around 1 1 mg/dL  Suspect prerenal due to GI losses  · Received 1 L normal saline in the ER; continue with IV fluid  · Nephrology input appreciated  · Daily metabolic panel and trend creatinine-- today's blood work is pending  · Avoid nephrotoxins and hypotension      Mucositis  Assessment & Plan  · POA with complains of hands and feet tenderness/cracking, erythema and blistering  Patient reports that this has been going on for the past few days even before she came into the hospital   states that patient has been having "joint pain", shakiness of the hands for almost a month  · Unclear etiology   · Working up for possible autoimmune issues  · Check ANCA, SUKHDEEP, CRP and   · Supportive care, wound care input appreciated, p r n  lidocaine swish and swallow    Bradycardia  Assessment & Plan  · Obtain EKG  · Monitor on telemetry  · Hemodynamic stable  · Will decrease Toprol to 50 mg daily instead of 100 daily    Macrocytic anemia  Assessment & Plan  · Hemoglobin 8 6, unclear baseline    Was 11 6 in 2020  ·     · Folate 2 1- start a supplement; vitamin B12- normal  · Iron panel results appreciated  · No active overt bleeding; hold off on transfusion unless hemoglobin less than 7  · Monitor CBC closely     Acute cystitis without hematuria  Assessment & Plan  · Urinalysis positive for leukocyte esterase, leukocytes, and bacteria  · Treated with ceftriaxone in ER-- completed 3 day course  · Urine culture- E coli     Gastroenteritis  Assessment & Plan  · Presents with generalized weakness and left upper quadrant abdominal pain in the setting of recent viral illness, history of gastric bypass in 1998  · She has not been able to keep down any oral intake for 5-6 days  · COVID negative  · Mild transaminitis-- improving   · CT abdomen pelvis: Findings as above suspicious for gastritis/duodenitis, i e  peptic ulcer disease, in the proper clinical setting  Hepatic steatosis re-demonstrated  · GI input appreciated- suspected infectious in etiology  · Stool cultures- negative; C difficile-negative  · 3/30-- the patient continues to have 6-7 episodes of nonbloody diarrhea since this a m ; she however has not been compliant with diet  The patient had salad, potato chips, Suzen Kipper for lunch  Diet changed to GI low-fat, low residue  I discussed diet option with the patient in details  Started on probiotics  · 3/31-- diarrhea is improving  Dietitian input appreciated  Hypomagnesemia  Assessment & Plan  · Continue to replete and monitor      Hypokalemia  Assessment & Plan  · Continue to replete and monitor      Hyponatremia  Assessment & Plan  · Sodium 120 --> 133  · Suspected hypovolemia hyponatremia with concurrent use of thiazide diuresis with GI losses  · Nephrology input appreciated-continue with IV fluid for volume expansion  · TSH 5 879   · Monitor sodium level closely; blood work this a m  is pending    Essential hypertension  Assessment & Plan  · BP reviewed  · Continue metoprolol decreased to 50 mg daily instead 100 mg daily   · Hold ACE-inhibitor for now due to BALJINDER  · Monitor blood pressure         VTE Prophylaxis:  Heparin    Patient Centered Rounds: I have performed bedside rounds with nursing staff today      Discussions with Specialists or Other Care Team Provider: renal   Education and Discussions with Family / Patient: patient and  at bedside     Current Length of Stay: 3 day(s)    Current Patient Status: Inpatient   Certification Statement: The patient will continue to require additional inpatient hospital stay due to Acute kidney injury    Discharge Plan:  Pending hospital course    Code Status: Level 1 - Full Code    Subjective:   Continues to have nonbloody bowel movements but frequency is much improved  She still feeling weak  She is unable to tolerate p o  intake due to painful mucosa  The patient is complaining of shakiness of her hands and redness with tenderness on her fingertips and palms  Objective:     Vitals:   Temp (24hrs), Av 8 °F (37 1 °C), Min:98 2 °F (36 8 °C), Max:99 5 °F (37 5 °C)    Temp:  [98 2 °F (36 8 °C)-99 5 °F (37 5 °C)] 98 2 °F (36 8 °C)  HR:  [79-89] 79  Resp:  [16] 16  BP: (108-155)/(70-82) 108/73  SpO2:  [98 %-100 %] 99 %  Body mass index is 31 8 kg/m²  Input and Output Summary (last 24 hours):     No intake or output data in the 24 hours ending 22 1301    Physical Exam:   Physical Exam  Vitals and nursing note reviewed  Constitutional:       General: She is not in acute distress  Appearance: Normal appearance  HENT:      Head: Normocephalic and atraumatic  Right Ear: External ear normal       Left Ear: External ear normal       Nose: Nose normal  No rhinorrhea  Mouth/Throat:      Mouth: Mucous membranes are moist       Pharynx: Oropharynx is clear  Posterior oropharyngeal erythema present  Eyes:      General:         Right eye: No discharge  Left eye: No discharge  Pupils: Pupils are equal, round, and reactive to light  Cardiovascular:      Rate and Rhythm: Normal rate and regular rhythm  Pulses: Normal pulses  Heart sounds: Normal heart sounds  No murmur heard  Pulmonary:      Effort: Pulmonary effort is normal  No respiratory distress  Breath sounds: Normal breath sounds  Abdominal:      General: Bowel sounds are normal  There is no distension  Palpations: Abdomen is soft  There is no mass  Tenderness: There is no abdominal tenderness     Musculoskeletal:         General: No swelling or tenderness  Normal range of motion  Cervical back: Normal range of motion and neck supple  No muscular tenderness  Skin:     General: Skin is warm and dry  Capillary Refill: Capillary refill takes less than 2 seconds  Findings: Erythema present  No rash  Neurological:      General: No focal deficit present  Mental Status: She is alert and oriented to person, place, and time  Mental status is at baseline  Psychiatric:         Mood and Affect: Mood normal          Behavior: Behavior normal          Thought Content: Thought content normal          Additional Data:     Labs:    Results from last 7 days   Lab Units 03/30/22  0732 03/29/22  0334 03/28/22  0324   WBC Thousand/uL 4 32   < > 6 51   HEMOGLOBIN g/dL 7 6*   < > 8 6*   HEMATOCRIT % 21 1*   < > 23 5*   PLATELETS Thousands/uL 218   < > 289   NEUTROS PCT %  --   --  60   LYMPHS PCT %  --   --  25   MONOS PCT %  --   --  10   EOS PCT %  --   --  3    < > = values in this interval not displayed  Results from last 7 days   Lab Units 03/30/22  0732 03/29/22  2345 03/29/22  0334   SODIUM mmol/L 133*   < > 125*   POTASSIUM mmol/L 4 3   < > 3 0*   CHLORIDE mmol/L 100   < > 91*   CO2 mmol/L 24   < > 23   BUN mg/dL 11   < > 16   CREATININE mg/dL 1 37*   < > 1 52*   CALCIUM mg/dL 8 5   < > 8 7   ALK PHOS U/L  --   --  131*   ALT U/L  --   --  21   AST U/L  --   --  31    < > = values in this interval not displayed  * I Have Reviewed All Lab Data Listed Above  * Additional Pertinent Lab Tests Reviewed:  [facility] 68 Admission  Reviewed    Imaging:  Imaging Reports Reviewed Today Include: n/a     Recent Cultures (last 7 days):     Results from last 7 days   Lab Units 03/29/22  0609 03/28/22  0448   URINE CULTURE   --  >100,000 cfu/ml Escherichia coli*   C DIFF TOXIN B BY PCR  Negative  --        Last 24 Hours Medication List:   Current Facility-Administered Medications   Medication Dose Route Frequency Provider Last Rate    acetaminophen  650 mg Oral Q6H PRN Mare Dominion, TRACY      busPIRone  10 mg Oral BID Stephanie Balling, TRACY      diphenhydrAMINE  25 mg Oral Q6H PRN Mare Dominion, TRACY      fluticasone  2 spray Each Nare Daily Maria De Jesus Pack, LETTY      folic acid  1 mg Oral Daily Maria De Jesus Pack, LETTY      guaiFENesin  200 mg Oral Q4H PRN Stephanie Balling, TRACY      heparin (porcine)  5,000 Units Subcutaneous [facility] Martierney Dominion, TRACY      Lidocaine Viscous HCl  15 mL Swish & Spit 4x Daily PRN Maria De Jesus Pack, LETTY      loperamide  2 mg Oral TID PRN Maria De Jesus Pack, LETTY      metoprolol succinate  100 mg Oral Daily Mare DominionTRACY      multi-electrolyte  100 mL/hr Intravenous Continuous Kris Bond PA-C 100 mL/hr (03/30/22 5162)    ondansetron  4 mg Intravenous Q6H PRN Sara Dominion, TRACY      pantoprazole  40 mg Oral Early Morning Maria De Jesus Pack, LETTY      saccharomyces boulardii  250 mg Oral BID Maria De Jesus Pack, LETTY      sodium chloride 0 9 % with KCl 20 mEq/L  125 mL/hr Intravenous Once Rogelio Wells DO          Today, Patient Was Seen By: LETTY Kinsey    ** Please Note: Dictation voice to text software may have been used in the creation of this document   **

## 2022-03-31 NOTE — PROGRESS NOTES
Progress Note - Nephrology   Hillary Guy 63 y o  female MRN: [id number]  Unit/Bed#: -01 Encounter: [id number]    A/P:  1  Acute kidney injury on top of chronic kidney disease   Blood work from today remains pending, creatinine from yesterday stable at about 1 37 mg/dL  Continue avoid nephrotoxins work to optimize overall care  Patient may require additional volume expansion if she is not eating and drinking appropriately  2  Chronic kidney disease stage IIIA with baseline creatinine around 1 2 mg/dL  3  Hypovolemic hyponatremia   Has been improving with isotonic saline volume expansion, continue monitor for now  3  Hypokalemia   Resolved status post potassium supplementation, continue monitor in at supplement as indicated  4  Acidosis   Resolved status post bicarb infusion, continue monitor consider additional supplementation depending on how she progresses clinically  5  Acute cystitis   Patient completed 3 day course of ceftriaxone and is now finished with treatment course, patient has been afebrile  6  Gastroenteritis  7  Hypomagnesemia   Continue monitor as supplementation as indicated  8  Skin disorder   Patient claims that her skin erythema and blistering has not occurred to her previously  It began yesterday the day prior, March 29th for March 30th, and has progressively worsened  This is also involving her oral mucous membranes      Follow up reason for today's visit:  Acute kidney injury/chronic kidney disease/hypokalemia/hyperkalemia/hyponatremia/acidosis    Acute kidney injury [facility])    Patient Active Problem List   Diagnosis    Class 1 obesity due to excess calories without serious comorbidity with body mass index (BMI) of 33 0 to 33 9 in adult    Essential hypertension    Anxiety and depression    Pap smear for cervical cancer screening    Postmenopausal bleeding    Vaginal atrophy    At risk for loss of bone density    Dyspareunia, female    S/P gastric bypass    History of myocardial infarction    Arthritis of lumbar spine    Acute kidney injury ([address] )    Hyponatremia    Hypokalemia    Hypomagnesemia    Gastroenteritis    Painful tongue    Acute cystitis without hematuria    Macrocytic anemia         Subjective:   Patient is complaining of sensitive skin on the hands and feet as well as in her mucous membranes  She cannot eat or drink due to the sensitivity despite the use of lidocaine to assist with numbing her mucous membranes  Patient had her feet in Epsom salts yesterday with minimal improvement  Objective:     Vitals: Blood pressure 108/73, pulse 79, temperature 98 2 °F (36 8 °C), temperature source Temporal, resp  rate 16, height 5' (1 524 m), weight 73 9 kg (162 lb 13 oz), SpO2 99 %  ,Body mass index is 31 8 kg/m²      Weight (last 2 days)     Date/Time Weight    03/29/22 02:18:01 73 9 (162 81)            Intake/Output Summary (Last 24 hours) at 3/31/2022 1050  Last data filed at 3/30/2022 1101  Gross per 24 hour   Intake --   Output 200 ml   Net -200 ml     I/O last 3 completed shifts:  In: -   Out: 200 [Stool:200]         Physical Exam: /73 (BP Location: Left arm)   Pulse 79   Temp 98 2 °F (36 8 °C) (Temporal)   Resp 16   Ht 5' (1 524 m)   Wt 73 9 kg (162 lb 13 oz)   SpO2 99%   BMI 31 80 kg/m²     General Appearance:    Alert, cooperative, no distress, appears stated age   Head:    Normocephalic, without obvious abnormality, atraumatic   Eyes:    Conjunctiva/corneas clear   Ears:    Normal external ears   Nose:   Nares normal, septum midline, mucosa normal, no drainage    or sinus tenderness   Throat:   Lips, mucosa, and tongue normal; increased erythema of the buccal mucosa and soft palate   Neck:   Supple   Back:     Symmetric, no curvature, ROM normal, no CVA tenderness   Lungs:     Clear to auscultation bilaterally, respirations unlabored   Chest wall:    No tenderness or deformity   Heart:    Regular rate and rhythm, S1 and S2 normal, no murmur, rub   or gallop   Abdomen:     Soft, non-tender, bowel sounds active   Extremities:   Extremities normal, atraumatic, no cyanosis or edema   Skin:   Erythema and skin cracking noted bilateral hands and feet which are new for the patient  Lymph nodes:   Cervical normal   Neurologic:   CNII-XII intact            Lab, Imaging and other studies: I have personally reviewed pertinent labs  CBC: No results found for: WBC, HGB, HCT, MCV, PLT, ADJUSTEDWBC, MCH, MCHC, RDW, MPV, NRBC  CMP: No results found for: NA, K, CL, CO2, ANIONGAP, BUN, CREATININE, GLUCOSE, CALCIUM, AST, ALT, ALKPHOS, PROT, BILITOT, EGFR      Results from last 7 days   Lab Units 03/30/22  0732 03/29/22  2345 03/29/22  0334 03/28/22  0906 03/28/22  0324   POTASSIUM mmol/L 4 3 4 5 3 0*   < > 3 2*   CHLORIDE mmol/L 100 98 91*   < > 84*   CO2 mmol/L 24 23 23   < > 19*   BUN mg/dL 11 12 16   < > 22   CREATININE mg/dL 1 37* 1 39* 1 52*   < > 1 73*   CALCIUM mg/dL 8 5 8 4 8 7   < > 8 8   ALK PHOS U/L  --   --  131*  --  157*   ALT U/L  --   --  21  --  31   AST U/L  --   --  31  --  55*    < > = values in this interval not displayed  Phosphorus: No results found for: PHOS  Magnesium: No results found for: MG  Urinalysis: No results found for: Donna Safer, SPECGRAV, PHUR, LEUKOCYTESUR, NITRITE, PROTEINUA, GLUCOSEU, KETONESU, BILIRUBINUR, BLOODU  Ionized Calcium: No results found for: CAION  Coagulation: No results found for: PT, INR, APTT  Troponin: No results found for: TROPONINI  ABG: No results found for: PHART, IQQ7VOP, PO2ART, WDJ7EFB, N9VAWJFR, BEART, SOURCE  Radiology review:     IMAGING  No results found      Current Facility-Administered Medications   Medication Dose Route Frequency    acetaminophen (TYLENOL) tablet 650 mg  650 mg Oral Q6H PRN    busPIRone (BUSPAR) tablet 10 mg  10 mg Oral BID    diphenhydrAMINE (BENADRYL) tablet 25 mg  25 mg Oral B4F PRN    folic acid (FOLVITE) tablet 1 mg  1 mg Oral Daily    guaiFENesin (ROBITUSSIN) oral solution 200 mg  200 mg Oral Q4H PRN    heparin (porcine) subcutaneous injection 5,000 Units  5,000 Units Subcutaneous Q8H [address]    Lidocaine Viscous HCl (XYLOCAINE) 2 % mucosal solution 15 mL  15 mL Swish & Spit 4x Daily PRN    loperamide (IMODIUM) capsule 2 mg  2 mg Oral TID PRN    metoprolol succinate (TOPROL-XL) 24 hr tablet 100 mg  100 mg Oral Daily    multi-electrolyte (PLASMALYTE-A/ISOLYTE-S PH 7 4) IV solution  100 mL/hr Intravenous Continuous    ondansetron (ZOFRAN) injection 4 mg  4 mg Intravenous Q6H PRN    pantoprazole (PROTONIX) EC tablet 40 mg  40 mg Oral Early Morning    saccharomyces boulardii (FLORASTOR) capsule 250 mg  250 mg Oral BID    sodium chloride 0 9 % with KCl 20 mEq/L infusion (premix)  125 mL/hr Intravenous Once     Medications Discontinued During This Encounter   Medication Reason    magnesium sulfate 4 g/100 mL IVPB (premix) 4 g     sodium chloride 0 9 % infusion     sodium chloride 0 9 % bolus 500 mL     sodium chloride 0 9 % infusion     busPIRone (BUSPAR) tablet 10 mg     losartan (COZAAR) tablet 100 mg     sodium chloride 0 9 % infusion     potassium chloride (K-DUR,KLOR-CON) CR tablet 40 mEq     busPIRone (BUSPAR) tablet 10 mg     cefTRIAXone (ROCEPHIN) IVPB (premix in dextrose) 1,000 mg 50 mL     pantoprazole (PROTONIX) injection 40 mg     Lidocaine Viscous HCl (XYLOCAINE) 2 % mucosal solution 15 mL        Rinku Falk, DO      This progress note was produced in part using a dictation device which may document imprecise wording from author's original intent

## 2022-03-31 NOTE — ASSESSMENT & PLAN NOTE
· Presents with generalized weakness and abdominal pain  · Creatinine 1 73 on admission, recent baseline appears to be around 1 1 mg/dL    Suspect prerenal due to GI losses  · Received 1 L normal saline in the ER; continue with IV fluid  · Nephrology input appreciated  · Daily metabolic panel and trend creatinine-- today's blood work is pending  · Avoid nephrotoxins and hypotension

## 2022-03-31 NOTE — ASSESSMENT & PLAN NOTE
· Hemoglobin 8 6, unclear baseline    Was 11 6 in 2020  ·     · Folate 2 1- start a supplement; vitamin B12- normal  · Iron panel results appreciated  · No active overt bleeding; hold off on transfusion unless hemoglobin less than 7  · Monitor CBC closely

## 2022-03-31 NOTE — ASSESSMENT & PLAN NOTE
· Presents with generalized weakness and left upper quadrant abdominal pain in the setting of recent viral illness, history of gastric bypass in 1998  · She has not been able to keep down any oral intake for 5-6 days  · COVID negative  · Mild transaminitis-- improving   · CT abdomen pelvis: Findings as above suspicious for gastritis/duodenitis, i e  peptic ulcer disease, in the proper clinical setting  Hepatic steatosis re-demonstrated  · GI input appreciated- suspected infectious in etiology  · Stool cultures- negative; C difficile-negative  · 3/30-- the patient continues to have 6-7 episodes of nonbloody diarrhea since this a m ; she however has not been compliant with diet  The patient had salad, potato chips, Carmela Cutler for lunch  Diet changed to GI low-fat, low residue  I discussed diet option with the patient in details  Started on probiotics  · 3/31-- diarrhea is improving  Dietitian input appreciated

## 2022-03-31 NOTE — PLAN OF CARE
VSS, afebrile  A/O x 4 upon assessment  No complaints of pain  SPO2 above 90% on RA  Up independently about room  No new skin issues  Complaints of diarrhea, PRN imodium administered x 2 this shift  Receiving continuous IVF  Remains free from falls, uses call light appropriately, makes needs known  Educated on new diet of Low residue/low fiber  Patient stated understanding but some food choice do not reflect this     Problem: PAIN - ADULT  Goal: Verbalizes/displays adequate comfort level or baseline comfort level  Description: Interventions:  - Encourage patient to monitor pain and request assistance  - Assess pain using appropriate pain scale  - Administer analgesics based on type and severity of pain and evaluate response  - Implement non-pharmacological measures as appropriate and evaluate response  - Consider cultural and social influences on pain and pain management  - Notify physician/advanced practitioner if interventions unsuccessful or patient reports new pain  Outcome: Progressing     Problem: INFECTION - ADULT  Goal: Absence or prevention of progression during hospitalization  Description: INTERVENTIONS:  - Assess and monitor for signs and symptoms of infection  - Monitor lab/diagnostic results  - Monitor all insertion sites, i e  indwelling lines, tubes, and drains  - Monitor endotracheal if appropriate and nasal secretions for changes in amount and color  - [place] appropriate cooling/warming therapies per order  - Administer medications as ordered  - Instruct and encourage patient and family to use good hand hygiene technique  - Identify and instruct in appropriate isolation precautions for identified infection/condition  Outcome: Progressing  Goal: Absence of fever/infection during neutropenic period  Description: INTERVENTIONS:  - Monitor WBC    Outcome: Progressing     Problem: DISCHARGE PLANNING  Goal: Discharge to home or other facility with appropriate resources  Description: INTERVENTIONS:  - Identify barriers to discharge w/patient and caregiver  - Arrange for needed discharge resources and transportation as appropriate  - Identify discharge learning needs (meds, wound care, etc )  - Arrange for interpretive services to assist at discharge as needed  - Refer to Case Management Department for coordinating discharge planning if the patient needs post-hospital services based on physician/advanced practitioner order or complex needs related to functional status, cognitive ability, or social support system  Outcome: Progressing     Problem: Knowledge Deficit  Goal: Patient/family/caregiver demonstrates understanding of disease process, treatment plan, medications, and discharge instructions  Description: Complete learning assessment and assess knowledge base  Interventions:  - Provide teaching at level of understanding  - Provide teaching via preferred learning methods  Outcome: Progressing     Problem: Potential for Falls  Goal: Patient will remain free of falls  Description: INTERVENTIONS:  - Educate patient/family on patient safety including physical limitations  - Instruct patient to call for assistance with activity   - Consult OT/PT to assist with strengthening/mobility   - Keep Call bell within reach  - Keep bed low and locked with side rails adjusted as appropriate  - Keep care items and personal belongings within reach  - Initiate and maintain comfort rounds  - Make Fall Risk Sign visible to staff  - Offer Toileting every 2 Hours, in advance of need  - Initiate/Maintain no alarms  - Obtain necessary fall risk management equipment: none  - Apply yellow socks and bracelet for high fall risk patients  - Consider moving patient to room near nurses station  Outcome: Progressing     Problem: Nutrition/Hydration-ADULT  Goal: Nutrient/Hydration intake appropriate for improving, restoring or maintaining nutritional needs  Description: Monitor and assess patient's nutrition/hydration status for malnutrition  Collaborate with interdisciplinary team and initiate plan and interventions as ordered  Monitor patient's weight and dietary intake as ordered or per policy  Utilize nutrition screening tool and intervene as necessary  Determine patient's food preferences and provide high-protein, high-caloric foods as appropriate       INTERVENTIONS:  - Monitor oral intake, urinary output, labs, and treatment plans  - Assess nutrition and hydration status and recommend course of action  - Evaluate amount of meals eaten  - Assist patient with eating if necessary   - Allow adequate time for meals  - Recommend/ encourage appropriate diets, oral nutritional supplements, and vitamin/mineral supplements  - Order, calculate, and assess calorie counts as needed  - Recommend, monitor, and adjust tube feedings and TPN/PPN based on assessed needs  - Assess need for intravenous fluids  - Provide specific nutrition/hydration education as appropriate  - Include patient/family/caregiver in decisions related to nutrition  Outcome: Progressing

## 2022-03-31 NOTE — ASSESSMENT & PLAN NOTE
· BP reviewed  · Continue metoprolol decreased to 50 mg daily instead 100 mg daily   · Hold ACE-inhibitor for now due to BALJINDER  · Monitor blood pressure

## 2022-03-31 NOTE — ASSESSMENT & PLAN NOTE
· POA with complains of hands and feet tenderness/cracking, erythema and blistering  Patient reports that this has been going on for the past few days even before she came into the hospital   states that patient has been having "joint pain", shakiness of the hands for almost a month     · Unclear etiology   · Working up for possible autoimmune issues  · Check ANCA, SUKHDEEP, CRP and   · Supportive care, wound care input appreciated, p r n  lidocaine swish and swallow

## 2022-04-01 ENCOUNTER — APPOINTMENT (INPATIENT)
Dept: GASTROENTEROLOGY | Facility: HOSPITAL | Age: 62
DRG: 249 | End: 2022-04-01
Payer: COMMERCIAL

## 2022-04-01 ENCOUNTER — APPOINTMENT (INPATIENT)
Dept: NON INVASIVE DIAGNOSTICS | Facility: HOSPITAL | Age: 62
DRG: 249 | End: 2022-04-01
Payer: COMMERCIAL

## 2022-04-01 LAB
ABO GROUP BLD: NORMAL
ABO GROUP BLD: NORMAL
ALBUMIN SERPL BCP-MCNC: 2.8 G/DL (ref 3.5–5)
ALP SERPL-CCNC: 91 U/L (ref 34–104)
ALT SERPL W P-5'-P-CCNC: 16 U/L (ref 7–52)
ANION GAP SERPL CALCULATED.3IONS-SCNC: 8 MMOL/L (ref 4–13)
AST SERPL W P-5'-P-CCNC: 22 U/L (ref 13–39)
BILIRUB SERPL-MCNC: 0.47 MG/DL (ref 0.2–1)
BLD GP AB SCN SERPL QL: NEGATIVE
BUN SERPL-MCNC: 10 MG/DL (ref 5–25)
CALCIUM ALBUM COR SERPL-MCNC: 9.2 MG/DL (ref 8.3–10.1)
CALCIUM SERPL-MCNC: 8.2 MG/DL (ref 8.4–10.2)
CHLORIDE SERPL-SCNC: 104 MMOL/L (ref 96–108)
CHLORIDE UR-SCNC: <10 MMOL/L
CO2 SERPL-SCNC: 23 MMOL/L (ref 21–32)
CREAT SERPL-MCNC: 1.4 MG/DL (ref 0.6–1.3)
ERYTHROCYTE [DISTWIDTH] IN BLOOD BY AUTOMATED COUNT: 13.8 % (ref 11.6–15.1)
GFR SERPL CREATININE-BSD FRML MDRD: 41 ML/MIN/1.73SQ M
GLUCOSE SERPL-MCNC: 83 MG/DL (ref 65–140)
HCT VFR BLD AUTO: 18.4 % (ref 34.8–46.1)
HGB BLD-MCNC: 6 G/DL (ref 11.5–15.4)
MCH RBC QN AUTO: 39.7 PG (ref 26.8–34.3)
MCHC RBC AUTO-ENTMCNC: 32.6 G/DL (ref 31.4–37.4)
MCV RBC AUTO: 122 FL (ref 82–98)
OSMOLALITY UR: 98 MMOL/KG
PLATELET # BLD AUTO: 175 THOUSANDS/UL (ref 149–390)
PMV BLD AUTO: 9 FL (ref 8.9–12.7)
POTASSIUM SERPL-SCNC: 4.2 MMOL/L (ref 3.5–5.3)
POTASSIUM UR-SCNC: 5.2 MMOL/L
PROT SERPL-MCNC: 4.9 G/DL (ref 6.4–8.4)
RBC # BLD AUTO: 1.51 MILLION/UL (ref 3.81–5.12)
RH BLD: POSITIVE
RH BLD: POSITIVE
RYE IGE QN: NEGATIVE
SODIUM 24H UR-SCNC: 20 MOL/L
SODIUM SERPL-SCNC: 135 MMOL/L (ref 135–147)
SPECIMEN EXPIRATION DATE: NORMAL
WBC # BLD AUTO: 4.6 THOUSAND/UL (ref 4.31–10.16)

## 2022-04-01 PROCEDURE — 86900 BLOOD TYPING SEROLOGIC ABO: CPT | Performed by: NURSE PRACTITIONER

## 2022-04-01 PROCEDURE — 84133 ASSAY OF URINE POTASSIUM: CPT | Performed by: INTERNAL MEDICINE

## 2022-04-01 PROCEDURE — 99232 SBSQ HOSP IP/OBS MODERATE 35: CPT | Performed by: INTERNAL MEDICINE

## 2022-04-01 PROCEDURE — P9016 RBC LEUKOCYTES REDUCED: HCPCS

## 2022-04-01 PROCEDURE — 84300 ASSAY OF URINE SODIUM: CPT | Performed by: INTERNAL MEDICINE

## 2022-04-01 PROCEDURE — 86850 RBC ANTIBODY SCREEN: CPT | Performed by: NURSE PRACTITIONER

## 2022-04-01 PROCEDURE — C9113 INJ PANTOPRAZOLE SODIUM, VIA: HCPCS | Performed by: NURSE PRACTITIONER

## 2022-04-01 PROCEDURE — 99232 SBSQ HOSP IP/OBS MODERATE 35: CPT | Performed by: NURSE PRACTITIONER

## 2022-04-01 PROCEDURE — 99255 IP/OBS CONSLTJ NEW/EST HI 80: CPT | Performed by: INTERNAL MEDICINE

## 2022-04-01 PROCEDURE — 86920 COMPATIBILITY TEST SPIN: CPT

## 2022-04-01 PROCEDURE — 30233N1 TRANSFUSION OF NONAUTOLOGOUS RED BLOOD CELLS INTO PERIPHERAL VEIN, PERCUTANEOUS APPROACH: ICD-10-PCS | Performed by: STUDENT IN AN ORGANIZED HEALTH CARE EDUCATION/TRAINING PROGRAM

## 2022-04-01 PROCEDURE — 80053 COMPREHEN METABOLIC PANEL: CPT | Performed by: NURSE PRACTITIONER

## 2022-04-01 PROCEDURE — 86901 BLOOD TYPING SEROLOGIC RH(D): CPT | Performed by: NURSE PRACTITIONER

## 2022-04-01 PROCEDURE — 83935 ASSAY OF URINE OSMOLALITY: CPT | Performed by: INTERNAL MEDICINE

## 2022-04-01 PROCEDURE — 82436 ASSAY OF URINE CHLORIDE: CPT | Performed by: INTERNAL MEDICINE

## 2022-04-01 PROCEDURE — 85025 COMPLETE CBC W/AUTO DIFF WBC: CPT | Performed by: NURSE PRACTITIONER

## 2022-04-01 RX ORDER — POLYETHYLENE GLYCOL 3350 17 G/17G
238 POWDER, FOR SOLUTION ORAL ONCE
Qty: 238 G | Refills: 0 | Status: SHIPPED | OUTPATIENT
Start: 2022-04-03 | End: 2022-04-03

## 2022-04-01 RX ORDER — DIPHENHYDRAMINE HYDROCHLORIDE 50 MG/ML
25 INJECTION INTRAMUSCULAR; INTRAVENOUS ONCE
Status: COMPLETED | OUTPATIENT
Start: 2022-04-01 | End: 2022-04-01

## 2022-04-01 RX ORDER — PANTOPRAZOLE SODIUM 40 MG/1
40 INJECTION, POWDER, FOR SOLUTION INTRAVENOUS EVERY 12 HOURS SCHEDULED
Status: DISCONTINUED | OUTPATIENT
Start: 2022-04-01 | End: 2022-04-05 | Stop reason: HOSPADM

## 2022-04-01 RX ORDER — POLYETHYLENE GLYCOL 3350 17 G/17G
238 POWDER, FOR SOLUTION ORAL ONCE
Status: COMPLETED | OUTPATIENT
Start: 2022-04-03 | End: 2022-04-03

## 2022-04-01 RX ADMIN — METOPROLOL SUCCINATE 50 MG: 50 TABLET, EXTENDED RELEASE ORAL at 10:29

## 2022-04-01 RX ADMIN — PANTOPRAZOLE SODIUM 40 MG: 40 INJECTION, POWDER, FOR SOLUTION INTRAVENOUS at 21:08

## 2022-04-01 RX ADMIN — GUAIFENESIN 200 MG: 200 SOLUTION ORAL at 10:55

## 2022-04-01 RX ADMIN — BUSPIRONE HYDROCHLORIDE 10 MG: 5 TABLET ORAL at 21:08

## 2022-04-01 RX ADMIN — DIPHENHYDRAMINE HCL 25 MG: 25 TABLET ORAL at 10:30

## 2022-04-01 RX ADMIN — HYDROCORTISONE SODIUM SUCCINATE 100 MG: 100 INJECTION, POWDER, FOR SOLUTION INTRAMUSCULAR; INTRAVENOUS at 13:18

## 2022-04-01 RX ADMIN — PANTOPRAZOLE SODIUM 40 MG: 40 INJECTION, POWDER, FOR SOLUTION INTRAVENOUS at 10:31

## 2022-04-01 RX ADMIN — HEPARIN SODIUM 5000 UNITS: 5000 INJECTION INTRAVENOUS; SUBCUTANEOUS at 05:23

## 2022-04-01 RX ADMIN — DIPHENHYDRAMINE HYDROCHLORIDE 25 MG: 50 INJECTION, SOLUTION INTRAMUSCULAR; INTRAVENOUS at 10:57

## 2022-04-01 RX ADMIN — PANTOPRAZOLE SODIUM 40 MG: 40 TABLET, DELAYED RELEASE ORAL at 05:24

## 2022-04-01 RX ADMIN — Medication 250 MG: at 20:00

## 2022-04-01 RX ADMIN — BUSPIRONE HYDROCHLORIDE 10 MG: 5 TABLET ORAL at 10:29

## 2022-04-01 RX ADMIN — Medication 250 MG: at 10:29

## 2022-04-01 RX ADMIN — FLUTICASONE PROPIONATE 2 SPRAY: 50 SPRAY, METERED NASAL at 10:30

## 2022-04-01 RX ADMIN — FOLIC ACID 1 MG: 1 TABLET ORAL at 10:28

## 2022-04-01 NOTE — ASSESSMENT & PLAN NOTE
· Obtain EKG  · Monitor on telemetry  · Hemodynamic stable  · Decreased Toprol to 50 mg daily instead of 100 daily  · Much improved

## 2022-04-01 NOTE — NURSING NOTE
Patient complained of itching during administration of first unit  Blood stopped  Physician notified  Solu-cortef given  Transfusion restarted after half hour per protocol

## 2022-04-01 NOTE — ASSESSMENT & PLAN NOTE
· Sodium 120 --> 135  · Suspected hypovolemia hyponatremia with concurrent use of thiazide diuresis with GI losses    · Nephrology input appreciated-continue with IV fluid for volume expansion  · TSH 5 879    · Monitor sodium level closely; blood work this a m  is pending

## 2022-04-01 NOTE — PROGRESS NOTES
Florinda U  68   Progress Note - Nora Axel 1960, 63 y o  female MRN: [id number]  Unit/Bed#: -01 Encounter: [id number]  Primary Care Provider: LETTY Piña   Date and time admitted to hospital: 3/28/2022  2:52 AM    * Acute kidney injury [facility])  Assessment & Plan  · Presents with generalized weakness and abdominal pain  · Creatinine 1 73 on admission, recent baseline appears to be around 1 1 mg/dL  Suspect prerenal due to GI losses  · Received 1 L normal saline in the ER; continue with IV fluid  · Nephrology input appreciated   · Daily metabolic panel and trend creatinine; will monitor renal function closely  · Avoid nephrotoxins and hypotension       Mucositis  Assessment & Plan  · POA with complains of hands and feet tenderness/cracking, erythema and blistering  Patient reports that this has been going on for the past few days even before she came into the hospital   states that patient has been having "joint pain", shakiness of the hands for almost a month  · Unclear etiology   · Working up for possible autoimmune issues  · ANCA, SUKHDEEP- pending; CRP 10 9, ESR 14  · Supportive care, wound care input appreciated, p r n  lidocaine swish and swallow    Bradycardia  Assessment & Plan  · Obtain EKG  · Monitor on telemetry  · Hemodynamic stable  · Decreased Toprol to 50 mg daily instead of 100 daily  · Much improved     Macrocytic anemia  Assessment & Plan  · With acute blood loss anemia suspected to be due to GI loss  The patient previously denies any bright red blood per rectum or dark stools on admission however now stating that she has been having "dark stools" over the past 1-2 weeks  The patient reports that she had blood transfusions for 6 months in 2000 (gastric bypass 1998) due to anemia  EGD and colonoscopy was done over 20 years ago  · Hemoglobin 8 6, unclear baseline  Was 11 6 in 2020    · Folate 2 1- started a supplement; vitamin B12- normal  · Iron panel results appreciated  · GI re-evaluation suspect GI bleed  · Started on PPI IV b i d   · Obtain occult blood  · Will received 2 units of packed red blood cells today, giving Benadryl prior to infusion due to hives with transfusion in the past   · Monitor CBC closely     Acute cystitis without hematuria  Assessment & Plan  · Urinalysis positive for leukocyte esterase, leukocytes, and bacteria  · Treated with ceftriaxone in ER-- completed 3 day course  · Urine culture- E coli       Gastroenteritis  Assessment & Plan  · Presents with generalized weakness and left upper quadrant abdominal pain in the setting of recent viral illness, history of gastric bypass in 1998  · She has not been able to keep down any oral intake for 5-6 days  · COVID negative  · Mild transaminitis-- improving   · CT abdomen pelvis: Findings as above suspicious for gastritis/duodenitis, i e  peptic ulcer disease, in the proper clinical setting  Hepatic steatosis re-demonstrated  · GI input appreciated- suspected infectious in etiology  · Stool cultures- negative; C difficile-negative  · 3/30-- the patient continues to have 6-7 episodes of nonbloody diarrhea since this a m ; she however has not been compliant with diet  The patient had salad, potato chips, Nohemi Darya for lunch  Diet changed to GI low-fat, low residue  I discussed diet option with the patient in details  Started on probiotics  · 3/31-- diarrhea is improving  Dietitian input appreciated  · 4/1-- diarrhea much improved    Hypomagnesemia  Assessment & Plan  · Continue to replete and monitor       Hypokalemia  Assessment & Plan  · Continue to replete and monitor       Hyponatremia  Assessment & Plan  · Sodium 120 --> 135  · Suspected hypovolemia hyponatremia with concurrent use of thiazide diuresis with GI losses    · Nephrology input appreciated-continue with IV fluid for volume expansion  · TSH 5 879    · Monitor sodium level closely; blood work this a m  is pending Essential hypertension  Assessment & Plan  · BP reviewed  · Continue metoprolol decreased to 50 mg daily instead 100 mg daily   · Hold ACE-inhibitor for now due to BALJINDER  · Monitor blood pressure      High anion gap metabolic acidosis-resolved as of 3/30/2022  Assessment & Plan  · Anion gap is 17 CO2 19  · In the setting of BALJINDER, GI losses  · This has resolved with IV fluid          VTE Prophylaxis:  SCDs only  will hold pharmacological DVT prophylaxis at this time  -- with acute blood loss anemia    Patient Centered Rounds: I have performed bedside rounds with nursing staff today  Discussions with Specialists or Other Care Team Provider:  GI, renal  Education and Discussions with Family / Patient:  Patient and  at bedside    Current Length of Stay: 4 day(s)    Current Patient Status: Inpatient   Certification Statement: The patient will continue to require additional inpatient hospital stay due to Acute kidney injury    Discharge Plan:  Pending hospital course    Code Status: Level 1 - Full Code    Subjective:   Feeling about the same  Diarrhea is improving  The patient now reports that she has dark color stools with  Upon asking further questions, she reports that she has been having dark stool for the past 1-2 weeks  She denies any nausea, vomiting, abdominal pain  Objective:     Vitals:   Temp (24hrs), Av 8 °F (37 1 °C), Min:98 5 °F (36 9 °C), Max:99 2 °F (37 3 °C)    Temp:  [98 5 °F (36 9 °C)-99 2 °F (37 3 °C)] 98 5 °F (36 9 °C)  HR:  [72-92] 72  Resp:  [14-17] 14  BP: (104-138)/(48-73) 104/48  SpO2:  [99 %-100 %] 100 %  Body mass index is 31 64 kg/m²  Input and Output Summary (last 24 hours): Intake/Output Summary (Last 24 hours) at 2022 1040  Last data filed at 3/31/2022 1824  Gross per 24 hour   Intake 240 ml   Output --   Net 240 ml       Physical Exam:   Physical Exam  Vitals and nursing note reviewed  Constitutional:       General: She is not in acute distress  Appearance: Normal appearance  HENT:      Head: Normocephalic and atraumatic  Right Ear: External ear normal       Left Ear: External ear normal       Nose: Nose normal  No rhinorrhea  Mouth/Throat:      Mouth: Mucous membranes are moist       Pharynx: Oropharynx is clear  Eyes:      General:         Right eye: No discharge  Left eye: No discharge  Pupils: Pupils are equal, round, and reactive to light  Cardiovascular:      Rate and Rhythm: Normal rate and regular rhythm  Pulses: Normal pulses  Heart sounds: Normal heart sounds  No murmur heard  Pulmonary:      Effort: Pulmonary effort is normal  No respiratory distress  Breath sounds: Normal breath sounds  Abdominal:      General: Bowel sounds are normal  There is no distension  Palpations: Abdomen is soft  There is no mass  Tenderness: There is no abdominal tenderness  Musculoskeletal:         General: No swelling or tenderness  Normal range of motion  Cervical back: Normal range of motion and neck supple  No muscular tenderness  Right lower leg: Edema (trace) present  Left lower leg: Edema (trace) present  Skin:     General: Skin is warm and dry  Capillary Refill: Capillary refill takes less than 2 seconds  Findings: No erythema or rash  Neurological:      General: No focal deficit present  Mental Status: She is alert and oriented to person, place, and time  Mental status is at baseline  Psychiatric:         Mood and Affect: Mood normal          Behavior: Behavior normal          Thought Content:  Thought content normal          Judgment: Judgment normal          Additional Data:     Labs:    Results from last 7 days   Lab Units 04/01/22  0536 03/31/22  1308 03/31/22  1308   WBC Thousand/uL 4 60   < > 6 12   HEMOGLOBIN g/dL 6 0*   < > 7 1*   HEMATOCRIT % 18 4*   < > 21 9*   PLATELETS Thousands/uL 175   < > 229   NEUTROS PCT %  --   --  48   LYMPHS PCT %  --   --  33 MONOS PCT %  --   --  15*   EOS PCT %  --   --  4    < > = values in this interval not displayed  Results from last 7 days   Lab Units 04/01/22  0535   SODIUM mmol/L 135   POTASSIUM mmol/L 4 2   CHLORIDE mmol/L 104   CO2 mmol/L 23   BUN mg/dL 10   CREATININE mg/dL 1 40*   CALCIUM mg/dL 8 2*   ALK PHOS U/L 91   ALT U/L 16   AST U/L 22                   * I Have Reviewed All Lab Data Listed Above  * Additional Pertinent Lab Tests Reviewed:  Param 66 Admission  Reviewed    Imaging:  Imaging Reports Reviewed Today Include: n/a     Recent Cultures (last 7 days):     Results from last 7 days   Lab Units 03/29/22  0609 03/28/22  0448   URINE CULTURE   --  >100,000 cfu/ml Escherichia coli*   C DIFF TOXIN B BY PCR  Negative  --        Last 24 Hours Medication List:   Current Facility-Administered Medications   Medication Dose Route Frequency Provider Last Rate    acetaminophen  650 mg Oral Q6H PRN Afshin Phan PA-C      busPIRone  10 mg Oral BID Pa Fears, CRNP      diphenhydrAMINE  25 mg Intravenous Once Pa Fears, CRNP      diphenhydrAMINE  25 mg Oral Q6H PRN Afshin Phan PA-C      fluticasone  2 spray Each Nare Daily Pa Fears, CRNP      folic acid  1 mg Oral Daily Pa Fears, CRNP      guaiFENesin  200 mg Oral Q4H PRN Nelson Aquino PA-C      heparin (porcine)  5,000 Units Subcutaneous [facility] Afshin Phan PA-C      Lidocaine Viscous HCl  15 mL Swish & Spit 4x Daily PRN Pa Fears, CRNP      loperamide  2 mg Oral TID PRN Pa Fears, CRNP      metoprolol succinate  50 mg Oral Daily Pa Fears, CRNP      multi-electrolyte  100 mL/hr Intravenous Continuous [facility]03/[facility]/22 2017[facility]    ondansetron  4 mg Intravenous Q6H PRN Afshin Phan PA-C      pantoprazole  40 mg Intravenous Q12H [address] Pa Fears, CRNP      saccharomyces boulardii  250 mg Oral BID Pa Fears, CRNP          Today, Patient Was Seen By: LETTY Martin    ** Please Note: Dictation voice to text software may have been used in the creation of this document   **

## 2022-04-01 NOTE — PROGRESS NOTES
Progress Note - Nephrology   Delio Gonzalez 63 y o  female MRN: [id number]  Unit/Bed#: -01 Encounter: [id number]    A/P:  1  Acute kidney injury superimposed on chronic kidney disease   - creatinine today is 1 4 mg/dL (1 37 mg/dL yesterday)   - nonoliguric, however I/O's not being followed   - fractional excretion of sodium is low at 0 53% indicating volume depletion  - she is receiving Plasmalyte at 100 mils per hour   - recheck electrolytes evaluate urine anion gap   - will receive 2 units of packed cells which should improve renal perfusion pressures    2  Chronic kidney disease stage IIIA   - baseline creatinine 1 2 mg/dL    3  Hypokalemia   - resolved    4  Acute cystitis   - received 3 days of ceftriaxone    5  Diarrhea   - will be seen by GI as now has profound anemia with a hemoglobin of 6   - patient does admit to tarry stools   -    7  Skin blistering    - SUKHDEEP pending - she has skin lesions, oral ulcers and anemia    - Check urine protein studies      Follow up reason for today's visit:  Acute kidney injury/    Acute kidney injury [facility])    Patient Active Problem List   Diagnosis    Class 1 obesity due to excess calories without serious comorbidity with body mass index (BMI) of 33 0 to 33 9 in adult    Essential hypertension    Anxiety and depression    Pap smear for cervical cancer screening    Postmenopausal bleeding    Vaginal atrophy    At risk for loss of bone density    Dyspareunia, female    S/P gastric bypass    History of myocardial infarction    Arthritis of lumbar spine    [facility] ([facility] [address] )    Hyponatremia    Hypokalemia    Hypomagnesemia    Gastroenteritis    Painful tongue    Acute cystitis without hematuria    Macrocytic anemia    Bradycardia    Mucositis         Subjective:   Denies dizziness, chest pain, shortness of breath, abdominal pain  Had abdominal pain for 3 weeks   She said she had diarrhea this morning with no further bowel movements    She has been having 2-3 weeks of diarrhea  Has decreased appetite  No nausea vomiting today  Says she is urinating well clear yellow urine    Objective:     Vitals: Blood pressure 125/69, pulse 74, temperature 98 1 °F (36 7 °C), resp  rate 16, height 5' (1 524 m), weight 73 5 kg (162 lb), SpO2 100 %  ,Body mass index is 31 64 kg/m²  Weight (last 2 days)     Date/Time Weight    03/31/22 1400 73 5 (162)            Intake/Output Summary (Last 24 hours) at 4/1/2022 1113  Last data filed at 3/31/2022 1824  Gross per 24 hour   Intake 240 ml   Output --   Net 240 ml     I/O last 3 completed shifts: In: 240 [P O :240]  Out: -          Physical Exam: /69   Pulse 74   Temp 98 1 °F (36 7 °C)   Resp 16   Ht 5' (1 524 m)   Wt 73 5 kg (162 lb)   SpO2 100%   BMI 31 64 kg/m²     General Appearance:    Alert, pale  cooperative, no distress, appears stated age   Head:    Normocephalic, without obvious abnormality, atraumatic   Eyes:    Conjunctiva/corneas clear   Ears:    Normal external ears   Nose:   Nares normal, septum midline, mucosa normal, no drainage    or sinus tenderness   Throat:   Lips, mucosa, and tongue normal; teeth and gums normal   Neck:   Supple, symmetrical, trachea midline, no adenopathy;        thyroid:  No enlargement/tenderness/nodules; no carotid    bruit or JVD   Back:     Symmetric, no curvature, ROM normal, no CVA tenderness   Lungs:     Clear to auscultation bilaterally, respirations unlabored   Chest wall:    No tenderness or deformity   Heart:    Regular rate and rhythm, S1 and S2 normal, no murmur, rub   or gallop   Abdomen:     Soft, non-tender, bowel sounds active   Extremities:   Extremities normal, atraumatic, no cyanosis trace edema   Skin:   Skin color has erythema of hands and feet  Hs oral ulcers   Lymph nodes:   Cervical normal   Neurologic:   CNII-XII intact            Lab, Imaging and other studies: I have personally reviewed pertinent labs    CBC:   Lab Results   Component Value Date    WBC 4 60 04/01/2022    HGB 6 0 (LL) 04/01/2022    HCT 18 4 (L) 04/01/2022     (H) 04/01/2022     04/01/2022    MCH 39 7 (H) 04/01/2022    MCHC 32 6 04/01/2022    RDW 13 8 04/01/2022    MPV 9 0 04/01/2022    NRBC 0 03/31/2022     CMP:   Lab Results   Component Value Date    K 4 2 04/01/2022     04/01/2022    CO2 23 04/01/2022    BUN 10 04/01/2022    CREATININE 1 40 (H) 04/01/2022    CALCIUM 8 2 (L) 04/01/2022    AST 22 04/01/2022    ALT 16 04/01/2022    ALKPHOS 91 04/01/2022    EGFR 41 04/01/2022         Results from last 7 days   Lab Units 04/01/22  0535 03/31/22  1310 03/30/22  0732 03/29/22  2345 03/29/22  0334 03/28/22  0906 03/28/22  0324   POTASSIUM mmol/L 4 2 4 7 4 3   < > 3 0*   < > 3 2*   CHLORIDE mmol/L 104 103 100   < > 91*   < > 84*   CO2 mmol/L 23 23 24   < > 23   < > 19*   BUN mg/dL 10 9 11   < > 16   < > 22   CREATININE mg/dL 1 40* 1 32* 1 37*   < > 1 52*   < > 1 73*   CALCIUM mg/dL 8 2* 8 8 8 5   < > 8 7   < > 8 8   ALK PHOS U/L 91  --   --   --  131*  --  157*   ALT U/L 16  --   --   --  21  --  31   AST U/L 22  --   --   --  31  --  55*    < > = values in this interval not displayed  Phosphorus: No results found for: PHOS  Magnesium: No results found for: MG  Urinalysis: No results found for: Albino Lo, SPECGRAV, PHUR, LEUKOCYTESUR, NITRITE, PROTEINUA, GLUCOSEU, KETONESU, BILIRUBINUR, BLOODU  Ionized Calcium: No results found for: CAION  Coagulation: No results found for: PT, INR, APTT  Troponin: No results found for: TROPONINI  ABG: No results found for: PHART, KXO3ZIB, PO2ART, COQ3IAX, Z6DBWEPI, BEART, SOURCE  Radiology review:     IMAGING  Procedure: Echo complete w/ contrast if indicated    Result Date: 3/31/2022  Narrative: Darin Painting  Left Ventricle: Left ventricular cavity size is normal  Wall thickness is mildly increased  The left ventricular ejection fraction is 55%   Systolic function is normal  Although no diagnostic regional wall motion abnormality was identified, this possibility cannot be completely excluded on the basis of this study  Diastolic function is normal for age    Left Atrium: The atrium is mildly dilated    Mitral Valve: There is mild regurgitation    Tricuspid Valve: There is mild regurgitation         Current Facility-Administered Medications   Medication Dose Route Frequency    acetaminophen (TYLENOL) tablet 650 mg  650 mg Oral Q6H PRN    busPIRone (BUSPAR) tablet 10 mg  10 mg Oral BID    diphenhydrAMINE (BENADRYL) tablet 25 mg  25 mg Oral Q6H PRN    fluticasone (FLONASE) 50 mcg/act nasal spray 2 spray  2 spray Each Nare Daily    folic acid (FOLVITE) tablet 1 mg  1 mg Oral Daily    guaiFENesin (ROBITUSSIN) oral solution 200 mg  200 mg Oral Q4H PRN    Lidocaine Viscous HCl (XYLOCAINE) 2 % mucosal solution 15 mL  15 mL Swish & Spit 4x Daily PRN    loperamide (IMODIUM) capsule 2 mg  2 mg Oral TID PRN    metoprolol succinate (TOPROL-XL) 24 hr tablet 50 mg  50 mg Oral Daily    multi-electrolyte (PLASMALYTE-A/ISOLYTE-S PH 7 4) IV solution  100 mL/hr Intravenous Continuous    ondansetron (ZOFRAN) injection 4 mg  4 mg Intravenous Q6H PRN    pantoprazole (PROTONIX) injection 40 mg  40 mg Intravenous Q12H AJ    saccharomyces boulardii (FLORASTOR) capsule 250 mg  250 mg Oral BID     Medications Discontinued During This Encounter   Medication Reason    magnesium sulfate 4 g/100 mL IVPB (premix) 4 g     sodium chloride 0 9 % infusion     sodium chloride 0 9 % bolus 500 mL     sodium chloride 0 9 % infusion     busPIRone (BUSPAR) tablet 10 mg     losartan (COZAAR) tablet 100 mg     sodium chloride 0 9 % infusion     potassium chloride (K-DUR,KLOR-CON) CR tablet 40 mEq     busPIRone (BUSPAR) tablet 10 mg     cefTRIAXone (ROCEPHIN) IVPB (premix in dextrose) 1,000 mg 50 mL     pantoprazole (PROTONIX) injection 40 mg     Lidocaine Viscous HCl (XYLOCAINE) 2 % mucosal solution 15 mL     metoprolol succinate (TOPROL-XL) 24 hr tablet 100 mg  busPIRone (BUSPAR) tablet 10 mg     pantoprazole (PROTONIX) EC tablet 40 mg     sodium chloride 0 9 % with KCl 20 mEq/L infusion (premix)     heparin (porcine) subcutaneous injection 5,000 Units        Daljit Brock MD      This progress note was produced in part using a dictation device which may document imprecise wording from author's original intent

## 2022-04-01 NOTE — ASSESSMENT & PLAN NOTE
· Presents with generalized weakness and left upper quadrant abdominal pain in the setting of recent viral illness, history of gastric bypass in 1998  · She has not been able to keep down any oral intake for 5-6 days  · COVID negative  · Mild transaminitis-- improving   · CT abdomen pelvis: Findings as above suspicious for gastritis/duodenitis, i e  peptic ulcer disease, in the proper clinical setting  Hepatic steatosis re-demonstrated  · GI input appreciated- suspected infectious in etiology  · Stool cultures- negative; C difficile-negative  · 3/30-- the patient continues to have 6-7 episodes of nonbloody diarrhea since this a m ; she however has not been compliant with diet  The patient had salad, potato chips, Nohemi Darya for lunch  Diet changed to GI low-fat, low residue  I discussed diet option with the patient in details  Started on probiotics  · 3/31-- diarrhea is improving  Dietitian input appreciated     · 4/1-- diarrhea much improved

## 2022-04-01 NOTE — ASSESSMENT & PLAN NOTE
· POA with complains of hands and feet tenderness/cracking, erythema and blistering  Patient reports that this has been going on for the past few days even before she came into the hospital   states that patient has been having "joint pain", shakiness of the hands for almost a month      · Unclear etiology   · 4/1-- complaining about "skin color" changes which has been going on since January especially on her feet/legs   · Will obtain arterial duplex and venous duplex   · Working up for possible autoimmune issues  · ANCA, SUKHDEEP- pending; CRP 10 9, ESR 14  · Supportive care, wound care input appreciated, p r n  lidocaine swish and swallow

## 2022-04-01 NOTE — PLAN OF CARE
Problem: PAIN - ADULT  Goal: Verbalizes/displays adequate comfort level or baseline comfort level  Description: Interventions:  - Encourage patient to monitor pain and request assistance  - Assess pain using appropriate pain scale  - Administer analgesics based on type and severity of pain and evaluate response  - Implement non-pharmacological measures as appropriate and evaluate response  - Consider cultural and social influences on pain and pain management  - Notify physician/advanced practitioner if interventions unsuccessful or patient reports new pain  Outcome: Progressing     Problem: INFECTION - ADULT  Goal: Absence or prevention of progression during hospitalization  Description: INTERVENTIONS:  - Assess and monitor for signs and symptoms of infection  - Monitor lab/diagnostic results  - Monitor all insertion sites, i e  indwelling lines, tubes, and drains  - Monitor endotracheal if appropriate and nasal secretions for changes in amount and color  - [place] appropriate cooling/warming therapies per order  - Administer medications as ordered  - Instruct and encourage patient and family to use good hand hygiene technique  - Identify and instruct in appropriate isolation precautions for identified infection/condition  Outcome: Progressing  Goal: Absence of fever/infection during neutropenic period  Description: INTERVENTIONS:  - Monitor WBC    Outcome: Progressing     Problem: DISCHARGE PLANNING  Goal: Discharge to home or other facility with appropriate resources  Description: INTERVENTIONS:  - Identify barriers to discharge w/patient and caregiver  - Arrange for needed discharge resources and transportation as appropriate  - Identify discharge learning needs (meds, wound care, etc )  - Arrange for interpretive services to assist at discharge as needed  - Refer to Case Management Department for coordinating discharge planning if the patient needs post-hospital services based on physician/advanced practitioner order or complex needs related to functional status, cognitive ability, or social support system  Outcome: Progressing

## 2022-04-01 NOTE — ASSESSMENT & PLAN NOTE
· With acute blood loss anemia suspected to be due to GI loss  The patient previously denies any bright red blood per rectum or dark stools on admission however now stating that she has been having "dark stools" over the past 1-2 weeks  The patient reports that she had blood transfusions for 6 months in 2000 (gastric bypass 1998) due to anemia  EGD and colonoscopy was done over 20 years ago  · Hemoglobin 8 6, unclear baseline  Was 11 6 in 2020    · Folate 2 1- started a supplement; vitamin B12- normal  · Iron panel results appreciated  · GI re-evaluation suspect GI bleed  · Started on PPI IV b i d   · Obtain occult blood  · Will received 2 units of packed red blood cells today, giving Benadryl prior to infusion due to hives with transfusion in the past   · Monitor CBC closely

## 2022-04-01 NOTE — NURSING NOTE
Patient stating that she was disconnected from her IV fluids early in the day by a male doctor  Patient stated that doctor told her she no longer needed them if she was drinking fluids  This RN explained that the order for the fluids was still there  Patient adamant that she not be hooked up to the IV fluids

## 2022-04-01 NOTE — PLAN OF CARE
Problem: PAIN - ADULT  Goal: Verbalizes/displays adequate comfort level or baseline comfort level  Description: Interventions:  - Encourage patient to monitor pain and request assistance  - Assess pain using appropriate pain scale  - Administer analgesics based on type and severity of pain and evaluate response  - Implement non-pharmacological measures as appropriate and evaluate response  - Consider cultural and social influences on pain and pain management  - Notify physician/advanced practitioner if interventions unsuccessful or patient reports new pain  Outcome: Progressing     Problem: INFECTION - ADULT  Goal: Absence or prevention of progression during hospitalization  Description: INTERVENTIONS:  - Assess and monitor for signs and symptoms of infection  - Monitor lab/diagnostic results  - Monitor all insertion sites, i e  indwelling lines, tubes, and drains  - Monitor endotracheal if appropriate and nasal secretions for changes in amount and color  - [place] appropriate cooling/warming therapies per order  - Administer medications as ordered  - Instruct and encourage patient and family to use good hand hygiene technique  - Identify and instruct in appropriate isolation precautions for identified infection/condition  Outcome: Progressing  Goal: Absence of fever/infection during neutropenic period  Description: INTERVENTIONS:  - Monitor WBC    Outcome: Progressing     Problem: DISCHARGE PLANNING  Goal: Discharge to home or other facility with appropriate resources  Description: INTERVENTIONS:  - Identify barriers to discharge w/patient and caregiver  - Arrange for needed discharge resources and transportation as appropriate  - Identify discharge learning needs (meds, wound care, etc )  - Arrange for interpretive services to assist at discharge as needed  - Refer to Case Management Department for coordinating discharge planning if the patient needs post-hospital services based on physician/advanced practitioner order or complex needs related to functional status, cognitive ability, or social support system  Outcome: Progressing     Problem: Knowledge Deficit  Goal: Patient/family/caregiver demonstrates understanding of disease process, treatment plan, medications, and discharge instructions  Description: Complete learning assessment and assess knowledge base    Interventions:  - Provide teaching at level of understanding  - Provide teaching via preferred learning methods  Outcome: Progressing     Problem: Potential for Falls  Goal: Patient will remain free of falls  Description: INTERVENTIONS:  - Educate patient/family on patient safety including physical limitations  - Instruct patient to call for assistance with activity   - Consult OT/PT to assist with strengthening/mobility   - Keep Call bell within reach  - Keep bed low and locked with side rails adjusted as appropriate  - Keep care items and personal belongings within reach  - Initiate and maintain comfort rounds  - Make Fall Risk Sign visible to staff  - Offer Toileting every 2 Hours, in advance of need  - Initiate/Maintain bed alarm  - Obtain necessary fall risk management equipment: bed alarm  - Apply yellow socks and bracelet for high fall risk patients  - Consider moving patient to room near nurses station  Outcome: Progressing

## 2022-04-01 NOTE — ASSESSMENT & PLAN NOTE
· Presents with generalized weakness and abdominal pain  · Creatinine 1 73 on admission, recent baseline appears to be around 1 1 mg/dL    Suspect prerenal due to GI losses  · Received 1 L normal saline in the ER; continue with IV fluid  · Nephrology input appreciated   · Daily metabolic panel and trend creatinine; will monitor renal function closely  · Avoid nephrotoxins and hypotension

## 2022-04-01 NOTE — NURSING NOTE
Awake and alert and oriented x4  R/a status 02 sat 99%  Heart rate 70/min  Loose occ cough for green sputum  Lungs clear  No sob  Skin is dry and intact no edema  Plus 2 pedal/radial pulses  Only pain is feet/ b/l worsens with standing  Urine is clear with void and she is passing flatus lbm was today  Call bell near and no distress  #2 unit prbcs are absorbing  Call bell near   at bedside

## 2022-04-01 NOTE — CONSULTS
Consultation - [facility]) [facility]  Blossom Ruano 63 y o  female MRN: [id number]  Unit/Bed#: -01 Encounter: [id number]        Inpatient consult to gastroenterology  Consult performed by: Imelda Edwards PA-C  Consult ordered by: Ajit Menard          Reason for Consult / Principal Problem:     Melena      ASSESSMENT AND PLAN:      Patient is a 63 y o  female with PMH significant for anxiety, depression, HTN, previous MI (2006), s/p gastric bypass (1998) who initially presented to the ED on 03/28 with with food aversion x2-3 weeks with new onset left-sided abdominal pain, generalized weakness, nausea/vomiting, and diarrhea x24 hrs  CT A/P was limited due to lack of PO or IV contrast, but suspicious for gastritis/duodenitis  Labs were notable for macrocytic anemia (hgb 8 6), lyte abnormalities, elevated Cr (1 73- now 1 56), mild transaminitis/hyperbilirubinemia (AST 55, ALT 31, alk-phos 157, T bili 1 36)  Patient initially denied overt GI bleeding and follow-up labs were not reflective of iron deficiency anemia, but with decreased folate and normal vitamin B12  Per SLIM patient now newly endorsing melena x2 weeks, but when questioned admits to black/tarry diarrhea with every BM for ?months  Initially deferred endoscopic evaluation, given lack overt GI bleeding, but patient now newly endorsing melena  Currently receiving 2 units PRBCs  Differential for UGIB most suspicious for PUD/anastomotic ulcer, but also includes esophagitis, gastritis, AVMs, bleeding polyp, or less likely malignancy  Patient made NPO and will plan for upper endoscopy later this afternoon   Recommend to continue antiemetics/analgesics PRN, IV pantoprazole, monitor/trend hgb and transfuse as needed, and monitor stools for further signs of overt GI bleeding   - NPO  - Scheduled for EGD later this afternoon (4/1)  - Transfuse for hgb >7 0 prior to procedure  - Continue antiemetics/analgesics PRN  - Continue IV pantoprazole  - Monitor/trend hgb; Transfuse as needed  - Monitor stools for further signs of overt GI bleeding    GI will continue to follow  ______________________________________________________________________    HPI: Patient is a 63 y o  female with PMH significant for anxiety, depression, HTN, previous MI (2006), s/p gastric bypass (1998) who initially presented to the ED on 03/28 with with food aversion x2-3 weeks with new onset left-sided abdominal pain, generalized weakness, nausea/vomiting, and diarrhea x24 hrs  Patient was admitted due to significant electrolyte abnormalities and BALJINDER in the setting of UTI  CT A/P was limited due to lack of PO or IV contrast, but suspicious for gastritis/duodenitis  Labs were notable for macrocytic anemia (hgb 8 6), electrolyte abnormalities, elevated Cr, mild transaminitis/hyperbilirubinemia  Patient's labs were not reflective of iron deficiency anemia, but with decreased folate (2 1) and normal vitamin B12  Patient denied any overt GI bleeding at that time, no melena/hematochezia - Therefore, endoscopic evaluation was deferred and recommended conservative treatment with repletion of electrolytes, antiemetics/analgesics PRN, and IV PPI to transition to oral once patient is able to tolerate PO  Patient's hemoglobin has slowly been downtrending, and today is 6 0  Per SLIM patient now newly endorses melena x2 weeks - Most recently today  However, when questioned, patient mentions black/tarry diarrhea with every bowel movement for ?months  Admits to taking oral iron supplementation outpatient, but not since she has been admitted  Denies hx of PUD, but did have an upper endoscopy 2 years after her gastric bypass due to anemia requiring IV iron and transfusions - Unsure of findings, documentation unavailable for review  Denies hematemesis or hematochezia  Denies fever/chills, heartburn, nausea/vomiting, descresed appetite   abdominal pain, constipation, unintentional weight loss  Denies chest pain, palpitations, shortness of breath, or lightheaded/dizziness  REVIEW OF SYSTEMS:    CONSTITUTIONAL: Denies any fever, chills, rigors, and weight loss  HEENT: No earache or tinnitus  Denies hearing loss or visual disturbances  CARDIOVASCULAR: No chest pain or palpitations  RESPIRATORY: Denies any cough, hemoptysis, shortness of breath or dyspnea on exertion  GASTROINTESTINAL: As noted in the History of Present Illness  GENITOURINARY: No problems with urination  Denies any hematuria or dysuria  NEUROLOGIC: No dizziness or vertigo, denies headaches  MUSCULOSKELETAL: Denies any muscle or joint pain  SKIN: Denies skin rashes or itching  ENDOCRINE: Denies excessive thirst  Denies intolerance to heat or cold  PSYCHOSOCIAL: Denies depression or anxiety  Denies any recent memory loss         Historical Information   Past Medical History:   Diagnosis Date    Anxiety     Heart attack [facility])     History of blood transfusion     History of depression     Hypertension      Past Surgical History:   Procedure Laterality Date    BREAST CYST EXCISION Right 1999  benign    BREAST SURGERY Right 1999    calcifications      SECTION  ,     CHOLECYSTECTOMY      GASTRIC BYPASS      OOPHORECTOMY Right      Social History   Social History     Substance and Sexual Activity   Alcohol Use Yes    Alcohol/week: 3 0 standard drinks    Types: 3 Shots of liquor per week     Social History     Substance and Sexual Activity   Drug Use Never     Social History     Tobacco Use   Smoking Status Never Smoker   Smokeless Tobacco Never Used     Family History   Problem Relation Age of Onset    Diabetes Mother     Heart attack Father     Diabetes Sister     Substance Abuse Brother     Diabetes Brother     No Known Problems Maternal Grandmother     Breast cancer Paternal Grandmother     No Known Problems Sister     No Known Problems Sister     No Known Problems Maternal Aunt     No Known Problems Maternal Aunt     No Known Problems Maternal Aunt     No Known Problems Paternal Aunt     No Known Problems Paternal Aunt     No Known Problems Paternal Aunt        Meds/Allergies     Medications Prior to Admission   Medication    busPIRone (BUSPAR) 10 mg tablet    diphenhydrAMINE (Benadryl Allergy) 25 mg capsule    metoprolol succinate (TOPROL-XL) 100 mg 24 hr tablet    valsartan-hydrochlorothiazide (DIOVAN-HCT) 320-25 MG per tablet    estradiol (ESTRACE) 0 1 mg/g vaginal cream    ibuprofen (MOTRIN) 600 mg tablet     Current Facility-Administered Medications   Medication Dose Route Frequency    acetaminophen (TYLENOL) tablet 650 mg  650 mg Oral Q6H PRN    busPIRone (BUSPAR) tablet 10 mg  10 mg Oral BID    diphenhydrAMINE (BENADRYL) tablet 25 mg  25 mg Oral Q6H PRN    fluticasone (FLONASE) 50 mcg/act nasal spray 2 spray  2 spray Each Nare Daily    folic acid (FOLVITE) tablet 1 mg  1 mg Oral Daily    guaiFENesin (ROBITUSSIN) oral solution 200 mg  200 mg Oral Q4H PRN    Lidocaine Viscous HCl (XYLOCAINE) 2 % mucosal solution 15 mL  15 mL Swish & Spit 4x Daily PRN    loperamide (IMODIUM) capsule 2 mg  2 mg Oral TID PRN    metoprolol succinate (TOPROL-XL) 24 hr tablet 50 mg  50 mg Oral Daily    multi-electrolyte (PLASMALYTE-A/ISOLYTE-S PH 7 4) IV solution  100 mL/hr Intravenous Continuous    ondansetron (ZOFRAN) injection 4 mg  4 mg Intravenous Q6H PRN    pantoprazole (PROTONIX) injection 40 mg  40 mg Intravenous Q12H [facility]    saccharomyces boulardii (FLORASTOR) capsule 250 mg  250 mg Oral BID       Allergies   Allergen Reactions    Oxycodone-Acetaminophen Hives     per t-system      Propoxyphene Hives    Shellfish Allergy - Food Allergy            Objective     Blood pressure 125/69, pulse 74, temperature 98 1 °F (36 7 °C), resp  rate 16, height 5' (1 524 m), weight 73 5 kg (162 lb), SpO2 100 %  Body mass index is 31 64 kg/m²        Intake/Output Summary (Last 24 hours) at 4/1/2022 1125  Last data filed at 3/31/2022 1824  Gross per 24 hour   Intake 240 ml   Output --   Net 240 ml         PHYSICAL EXAM:      General Appearance:   Alert, cooperative, no distress   HEENT:   Normocephalic, atraumatic, anicteric  Neck:  Supple, symmetrical, trachea midline   Lungs:   Clear to auscultation bilaterally; no rales, rhonchi or wheezing; respirations unlabored    Heart[de-identified]   Regular rate and rhythm; no murmur, rub, or gallop  Abdomen:   Soft, non-tender, non-distended; normal bowel sounds; no masses, no organomegaly    Genitalia:   Deferred    Rectal:   Deferred    Extremities:  No cyanosis, clubbing or edema    Pulses:  2+ and symmetric all extremities    Skin:  No jaundice, rashes, or lesions    Lymph nodes:  No palpable cervical lymphadenopathy        Lab Results:   No results displayed because visit has over 200 results  Imaging Studies: I have personally reviewed pertinent imaging studies

## 2022-04-02 LAB
ABO GROUP BLD BPU: NORMAL
ABO GROUP BLD BPU: NORMAL
ANION GAP SERPL CALCULATED.3IONS-SCNC: 9 MMOL/L (ref 4–13)
BPU ID: NORMAL
BPU ID: NORMAL
BUN SERPL-MCNC: 10 MG/DL (ref 5–25)
CALCIUM SERPL-MCNC: 8.5 MG/DL (ref 8.4–10.2)
CHLORIDE SERPL-SCNC: 104 MMOL/L (ref 96–108)
CO2 SERPL-SCNC: 20 MMOL/L (ref 21–32)
CREAT SERPL-MCNC: 1.39 MG/DL (ref 0.6–1.3)
CROSSMATCH: NORMAL
CROSSMATCH: NORMAL
GFR SERPL CREATININE-BSD FRML MDRD: 41 ML/MIN/1.73SQ M
GLUCOSE SERPL-MCNC: 106 MG/DL (ref 65–140)
HCT VFR BLD AUTO: 27.3 % (ref 34.8–46.1)
HGB BLD-MCNC: 8.9 G/DL (ref 11.5–15.4)
MCH RBC QN AUTO: 35.3 PG (ref 26.8–34.3)
MCHC RBC AUTO-ENTMCNC: 32.6 G/DL (ref 31.4–37.4)
MCV RBC AUTO: 108 FL (ref 82–98)
PLATELET # BLD AUTO: 175 THOUSANDS/UL (ref 149–390)
PMV BLD AUTO: 8.9 FL (ref 8.9–12.7)
POTASSIUM SERPL-SCNC: 4 MMOL/L (ref 3.5–5.3)
RBC # BLD AUTO: 2.52 MILLION/UL (ref 3.81–5.12)
SODIUM SERPL-SCNC: 133 MMOL/L (ref 135–147)
UNIT DISPENSE STATUS: NORMAL
UNIT DISPENSE STATUS: NORMAL
UNIT PRODUCT CODE: NORMAL
UNIT PRODUCT CODE: NORMAL
UNIT PRODUCT VOLUME: 350 ML
UNIT PRODUCT VOLUME: 350 ML
UNIT RH: NORMAL
UNIT RH: NORMAL
WBC # BLD AUTO: 4.45 THOUSAND/UL (ref 4.31–10.16)

## 2022-04-02 PROCEDURE — C9113 INJ PANTOPRAZOLE SODIUM, VIA: HCPCS | Performed by: NURSE PRACTITIONER

## 2022-04-02 PROCEDURE — 80048 BASIC METABOLIC PNL TOTAL CA: CPT | Performed by: NURSE PRACTITIONER

## 2022-04-02 PROCEDURE — 85027 COMPLETE CBC AUTOMATED: CPT | Performed by: NURSE PRACTITIONER

## 2022-04-02 PROCEDURE — 99232 SBSQ HOSP IP/OBS MODERATE 35: CPT | Performed by: INTERNAL MEDICINE

## 2022-04-02 PROCEDURE — 99232 SBSQ HOSP IP/OBS MODERATE 35: CPT | Performed by: NURSE PRACTITIONER

## 2022-04-02 RX ORDER — SODIUM BICARBONATE 650 MG/1
650 TABLET ORAL
Status: DISCONTINUED | OUTPATIENT
Start: 2022-04-02 | End: 2022-04-05 | Stop reason: HOSPADM

## 2022-04-02 RX ADMIN — ACETAMINOPHEN 650 MG: 325 TABLET ORAL at 05:43

## 2022-04-02 RX ADMIN — Medication 250 MG: at 17:04

## 2022-04-02 RX ADMIN — PANTOPRAZOLE SODIUM 40 MG: 40 INJECTION, POWDER, FOR SOLUTION INTRAVENOUS at 21:56

## 2022-04-02 RX ADMIN — GUAIFENESIN 200 MG: 200 SOLUTION ORAL at 21:55

## 2022-04-02 RX ADMIN — FOLIC ACID 1 MG: 1 TABLET ORAL at 08:44

## 2022-04-02 RX ADMIN — METOPROLOL SUCCINATE 50 MG: 50 TABLET, EXTENDED RELEASE ORAL at 08:44

## 2022-04-02 RX ADMIN — FLUTICASONE PROPIONATE 2 SPRAY: 50 SPRAY, METERED NASAL at 08:44

## 2022-04-02 RX ADMIN — BUSPIRONE HYDROCHLORIDE 10 MG: 5 TABLET ORAL at 21:56

## 2022-04-02 RX ADMIN — ACETAMINOPHEN 650 MG: 325 TABLET ORAL at 21:56

## 2022-04-02 RX ADMIN — SODIUM BICARBONATE 650 MG: 650 TABLET ORAL at 11:23

## 2022-04-02 RX ADMIN — SODIUM CHLORIDE, SODIUM GLUCONATE, SODIUM ACETATE, POTASSIUM CHLORIDE, MAGNESIUM CHLORIDE, SODIUM PHOSPHATE, DIBASIC, AND POTASSIUM PHOSPHATE 100 ML/HR: .53; .5; .37; .037; .03; .012; .00082 INJECTION, SOLUTION INTRAVENOUS at 05:08

## 2022-04-02 RX ADMIN — Medication 250 MG: at 08:43

## 2022-04-02 RX ADMIN — BUSPIRONE HYDROCHLORIDE 10 MG: 5 TABLET ORAL at 08:44

## 2022-04-02 RX ADMIN — SODIUM BICARBONATE 650 MG: 650 TABLET ORAL at 17:04

## 2022-04-02 RX ADMIN — ACETAMINOPHEN 650 MG: 325 TABLET ORAL at 12:02

## 2022-04-02 RX ADMIN — PANTOPRAZOLE SODIUM 40 MG: 40 INJECTION, POWDER, FOR SOLUTION INTRAVENOUS at 08:44

## 2022-04-02 NOTE — NURSING NOTE
phycian aware of b/l foot pains and due to bradycardia only wants pt to have tylenol which was administered  Call bell near

## 2022-04-02 NOTE — PROGRESS NOTES
Progress Note - Nephrology   Homer Boucher 63 y o  female MRN: [id number]  Unit/Bed#: -01 Encounter: [id number]    A/P:  1  Acute kidney injury superimposed on chronic kidney disease present on admission   - kidney function is essentially the same at 1 point 3 9 mg/dL today   - nonoliguric and has no dysuria   - she appears to be close to her start baseline of 1 2 mg/dL   - kidneys unremarkable on CT imaging on March 28   - will need outpatient follow-up - I have ordered a bmp for 10 days post discharge   - although history of GI blood loss suggests ATN, her fractional excretion of sodium was low indicating volume depletion    2  Hypovolemic hyponatremia   - improving-received saline    3  Chronic kidney disease stage IIIA with baseline creatinine of 1 2 mg/dL    4  Metabolic acidosis non-anion gap   - due to diarrheal loss   - start sodium bicarbonate 650 twice a day until bicarbonate is greater than 22 millimoles per L    5  Skin disorder with oral ulcers   - SUKHDEEP is negative but would recommend a Rheumatology evaluation      Follow up reason for today's visit:  Acute kidney injury superimposed on chronic kidney disease    Acute kidney injury [facility])    Patient Active Problem List   Diagnosis    Class 1 obesity due to excess calories without serious comorbidity with body mass index (BMI) of 33 0 to 33 9 in adult    Essential hypertension    Anxiety and depression    Pap smear for cervical cancer screening    Postmenopausal bleeding    Vaginal atrophy    At risk for loss of bone density    Dyspareunia, female    S/P gastric bypass    History of myocardial infarction    Arthritis of lumbar spine    Acute kidney injury ([facility] [address] )    Hyponatremia    Hypokalemia    Hypomagnesemia    Gastroenteritis    Painful tongue    Acute cystitis without hematuria    Macrocytic anemia    Bradycardia    Mucositis         Subjective:   No acute events overnight    Notes reviewed   A 10 point review of systems is unremarkable    Objective:     Vitals: Blood pressure 134/53, pulse 82, temperature 98 1 °F (36 7 °C), resp  rate 18, height 5' (1 524 m), weight 73 5 kg (162 lb), SpO2 99 %  ,Body mass index is 31 64 kg/m²  Weight (last 2 days)     Date/Time Weight    03/31/22 1400 73 5 (162)            Intake/Output Summary (Last 24 hours) at 4/2/2022 0939  Last data filed at 4/2/2022 0548  Gross per 24 hour   Intake 2370 ml   Output --   Net 2370 ml     I/O last 3 completed shifts: In: 36 [P O :420; I V :1200; Blood:750]  Out: -          Physical Exam: /53   Pulse 82   Temp 98 1 °F (36 7 °C)   Resp 18   Ht 5' (1 524 m)   Wt 73 5 kg (162 lb)   SpO2 99%   BMI 31 64 kg/m²     General Appearance:    Alert, cooperative, no distress, appears stated age   Head:    Normocephalic, without obvious abnormality, atraumatic   Eyes:    Conjunctiva/corneas clear   Ears:    Normal external ears   Nose:   Nares normal, septum midline, mucosa normal, no drainage    or sinus tenderness   Throat:   Lips, mucosa, and tongue normal; teeth and gums normal   Neck:   Supple, symmetrical, trachea midline, no adenopathy;        thyroid:  No enlargement/tenderness/nodules; no carotid    bruit or JVD   Back:     Symmetric, no curvature, ROM normal, no CVA tenderness   Lungs:     Clear to auscultation bilaterally, respirations unlabored   Chest wall:    No tenderness or deformity   Heart:    Regular rate and rhythm, S1 and S2 normal, no murmur, rub   or gallop   Abdomen:     Soft, non-tender, bowel sounds active   Extremities:   Extremities normal, atraumatic, no cyanosis or edema   Skin:   Skin color, texture, turgor normal, no rashes or lesions   Lymph nodes:   Cervical normal   Neurologic:   CNII-XII intact            Lab, Imaging and other studies: I have personally reviewed pertinent labs    CBC:   Lab Results   Component Value Date    WBC 4 45 04/02/2022    HGB 8 9 (L) 04/02/2022    HCT 27 3 (L) 04/02/2022     (H) 04/02/2022  04/02/2022    MCH 35 3 (H) 04/02/2022    MCHC 32 6 04/02/2022    MPV 8 9 04/02/2022     CMP:   Lab Results   Component Value Date    K 4 0 04/02/2022     04/02/2022    CO2 20 (L) 04/02/2022    BUN 10 04/02/2022    CREATININE 1 39 (H) 04/02/2022    CALCIUM 8 5 04/02/2022    EGFR 41 04/02/2022         Results from last 7 days   Lab Units 04/02/22  0504 04/01/22  0535 03/31/22  1310 03/29/22  2345 03/29/22  0334 03/28/22  0906 03/28/22  0324   POTASSIUM mmol/L 4 0 4 2 4 7   < > 3 0*   < > 3 2*   CHLORIDE mmol/L 104 104 103   < > 91*   < > 84*   CO2 mmol/L 20* 23 23   < > 23   < > 19*   BUN mg/dL 10 10 9   < > 16   < > 22   CREATININE mg/dL 1 39* 1 40* 1 32*   < > 1 52*   < > 1 73*   CALCIUM mg/dL 8 5 8 2* 8 8   < > 8 7   < > 8 8   ALK PHOS U/L  --  91  --   --  131*  --  157*   ALT U/L  --  16  --   --  21  --  31   AST U/L  --  22  --   --  31  --  55*    < > = values in this interval not displayed  Phosphorus: No results found for: PHOS  Magnesium: No results found for: MG  Urinalysis: No results found for: Cierra Dad, SPECGRAV, PHUR, LEUKOCYTESUR, NITRITE, PROTEINUA, GLUCOSEU, KETONESU, BILIRUBINUR, BLOODU  Ionized Calcium: No results found for: CAION  Coagulation: No results found for: PT, INR, APTT  Troponin: No results found for: TROPONINI  ABG: No results found for: PHART, EDA4YZR, PO2ART, OIU9HUM, K9UUMZPL, BEART, SOURCE  Radiology review:     IMAGING  Procedure: Echo complete w/ contrast if indicated    Result Date: 3/31/2022  Narrative: [facility]  Left Ventricle: Left ventricular cavity size is normal  Wall thickness is mildly increased  The left ventricular ejection fraction is 55%  Systolic function is normal  Although no diagnostic regional wall motion abnormality was identified, this possibility cannot be completely excluded on the basis of this study  Diastolic function is normal for age    Left Atrium: The atrium is mildly dilated    Mitral Valve: There is mild regurgitation    Tricuspid Valve: There is mild regurgitation         Current Facility-Administered Medications   Medication Dose Route Frequency    acetaminophen (TYLENOL) tablet 650 mg  650 mg Oral Q6H PRN    [START ON 4/3/2022] bisacodyl (DULCOLAX) EC tablet 5 mg  5 mg Oral Once    busPIRone (BUSPAR) tablet 10 mg  10 mg Oral BID    diphenhydrAMINE (BENADRYL) tablet 25 mg  25 mg Oral Q6H PRN    fluticasone (FLONASE) 50 mcg/act nasal spray 2 spray  2 spray Each Nare Daily    folic acid (FOLVITE) tablet 1 mg  1 mg Oral Daily    guaiFENesin (ROBITUSSIN) oral solution 200 mg  200 mg Oral Q4H PRN    Lidocaine Viscous HCl (XYLOCAINE) 2 % mucosal solution 15 mL  15 mL Swish & Spit 4x Daily PRN    loperamide (IMODIUM) capsule 2 mg  2 mg Oral TID PRN    metoprolol succinate (TOPROL-XL) 24 hr tablet 50 mg  50 mg Oral Daily    multi-electrolyte (PLASMALYTE-A/ISOLYTE-S PH 7 4) IV solution  100 mL/hr Intravenous Continuous    ondansetron (ZOFRAN) injection 4 mg  4 mg Intravenous Q6H PRN    pantoprazole (PROTONIX) injection 40 mg  40 mg Intravenous Q12H [address]    [START ON 4/3/2022] polyethylene glycol (GLYCOLAX) bowel prep 238 g  238 g Oral Once    saccharomyces boulardii (FLORASTOR) capsule 250 mg  250 mg Oral BID     Medications Discontinued During This Encounter   Medication Reason    magnesium sulfate 4 g/100 mL IVPB (premix) 4 g     sodium chloride 0 9 % infusion     sodium chloride 0 9 % bolus 500 mL     sodium chloride 0 9 % infusion     busPIRone (BUSPAR) tablet 10 mg     losartan (COZAAR) tablet 100 mg     sodium chloride 0 9 % infusion     potassium chloride (K-DUR,KLOR-CON) CR tablet 40 mEq     busPIRone (BUSPAR) tablet 10 mg     cefTRIAXone (ROCEPHIN) IVPB (premix in dextrose) 1,000 mg 50 mL     pantoprazole (PROTONIX) injection 40 mg     Lidocaine Viscous HCl (XYLOCAINE) 2 % mucosal solution 15 mL     metoprolol succinate (TOPROL-XL) 24 hr tablet 100 mg     busPIRone (BUSPAR) tablet 10 mg     pantoprazole (PROTONIX) EC tablet 40 mg     sodium chloride 0 9 % with KCl 20 mEq/L infusion (premix)     heparin (porcine) subcutaneous injection 5,000 Units        Morelia Bryant MD      This progress note was produced in part using a dictation device which may document imprecise wording from author's original intent

## 2022-04-02 NOTE — ASSESSMENT & PLAN NOTE
· With acute blood loss anemia suspected to be due to GI loss  The patient previously denies any bright red blood per rectum or dark stools on admission however now stating that she has been having "dark stools" over the past 1-2 weeks  The patient reports that she had blood transfusions for 6 months in 2000 (gastric bypass 1998) due to anemia  EGD and colonoscopy was done over 20 years ago  · Hemoglobin 8 6, unclear baseline  Was 11 6 in 2020    · Folate 2 1- started a supplement; vitamin B12- normal  · Iron panel results appreciated  · GI input appreciated  · Started on PPI IV b i d   · Obtain occult blood  · Planned for EGD and colonoscopy by GI on Monday  · Monitor H&H closely and transfuse to keep hemoglobin greater than 7  · 4/1- 2 units of packed red blood cells and Benadryl due to itching and hives  · Monitor CBC closely

## 2022-04-02 NOTE — ASSESSMENT & PLAN NOTE
· Presents with generalized weakness and abdominal pain  · Creatinine 1 73 on admission, recent baseline appears to be around 1 1 mg/dL    Suspect prerenal due to GI losses and possible ATN   · Received 1 L normal saline in the ER; received IVF   · Nephrology input appreciated    · Will monitor renal function closely  · Avoid nephrotoxins and hypotension

## 2022-04-02 NOTE — ASSESSMENT & PLAN NOTE
· Sodium 120 --> 133-135  · Suspected hypovolemia hyponatremia with concurrent use of thiazide diuresis with GI losses    · Nephrology input appreciated-continue with IV fluid for volume expansion  · TSH 5 879     · Monitor sodium level closely

## 2022-04-02 NOTE — PLAN OF CARE
Problem: PAIN - ADULT  Goal: Verbalizes/displays adequate comfort level or baseline comfort level  Description: Interventions:  - Encourage patient to monitor pain and request assistance  - Assess pain using appropriate pain scale  - Administer analgesics based on type and severity of pain and evaluate response  - Implement non-pharmacological measures as appropriate and evaluate response  - Consider cultural and social influences on pain and pain management  - Notify physician/advanced practitioner if interventions unsuccessful or patient reports new pain  Outcome: Progressing     Problem: INFECTION - ADULT  Goal: Absence or prevention of progression during hospitalization  Description: INTERVENTIONS:  - Assess and monitor for signs and symptoms of infection  - Monitor lab/diagnostic results  - Monitor all insertion sites, i e  indwelling lines, tubes, and drains  - Monitor endotracheal if appropriate and nasal secretions for changes in amount and color  - Clarinda appropriate cooling/warming therapies per order  - Administer medications as ordered  - Instruct and encourage patient and family to use good hand hygiene technique  - Identify and instruct in appropriate isolation precautions for identified infection/condition  Outcome: Progressing  Goal: Absence of fever/infection during neutropenic period  Description: INTERVENTIONS:  - Monitor WBC    Outcome: Progressing     Problem: DISCHARGE PLANNING  Goal: Discharge to home or other facility with appropriate resources  Description: INTERVENTIONS:  - Identify barriers to discharge w/patient and caregiver  - Arrange for needed discharge resources and transportation as appropriate  - Identify discharge learning needs (meds, wound care, etc )  - Arrange for interpretive services to assist at discharge as needed  - Refer to Case Management Department for coordinating discharge planning if the patient needs post-hospital services based on physician/advanced practitioner order or complex needs related to functional status, cognitive ability, or social support system  Outcome: Progressing     Problem: Knowledge Deficit  Goal: Patient/family/caregiver demonstrates understanding of disease process, treatment plan, medications, and discharge instructions  Description: Complete learning assessment and assess knowledge base    Interventions:  - Provide teaching at level of understanding  - Provide teaching via preferred learning methods  Outcome: Progressing     Problem: Potential for Falls  Goal: Patient will remain free of falls  Description: INTERVENTIONS:  - Educate patient/family on patient safety including physical limitations  - Instruct patient to call for assistance with activity   - Consult OT/PT to assist with strengthening/mobility   - Keep Call bell within reach  - Keep bed low and locked with side rails adjusted as appropriate  - Keep care items and personal belongings within reach  - Initiate and maintain comfort rounds  - Make Fall Risk Sign visible to staff  - Offer Toileting every 2 Hours, in advance of need  - Initiate/Maintain bed alarm  - Obtain necessary fall risk management equipment: bed   - Apply yellow socks and bracelet for high fall risk patients  - Consider moving patient to room near nurses station  Outcome: Progressing no

## 2022-04-02 NOTE — ASSESSMENT & PLAN NOTE
· With h/o tachycardia   · Hemodynamic stable  · Decreased Toprol to 50 mg daily instead of 100 daily  · Much improved

## 2022-04-02 NOTE — ASSESSMENT & PLAN NOTE
· POA with complains of hands and feet tenderness/cracking, erythema and blistering  Patient reports that this has been going on for the past few days even before she came into the hospital   states that patient has been having "joint pain", shakiness of the hands for almost a month      · Unclear etiology   · 4/1-- complaining about "skin color" changes which has been going on since January especially on her feet/legs   · Will obtain arterial duplex and venous duplex - pending   · Working up for possible autoimmune issues  · ANCA- pending, SUKHDEEP- negative, CRP 10 9, ESR 14  · Supportive care, wound care input appreciated, p r n  lidocaine swish and swallow  · Will need outpatient rheumatology evaluation outpatient

## 2022-04-02 NOTE — NURSING NOTE
Pt presently sitting at the side of the bed in no acute distress eating clear liquids, no ss of any bleeding noted

## 2022-04-02 NOTE — ASSESSMENT & PLAN NOTE
· Presents with generalized weakness and left upper quadrant abdominal pain in the setting of recent viral illness, history of gastric bypass in 1998  · She has not been able to keep down any oral intake for 5-6 days  · COVID negative  · Mild transaminitis-- improving   · CT abdomen pelvis: Findings as above suspicious for gastritis/duodenitis, i e  peptic ulcer disease, in the proper clinical setting  Hepatic steatosis re-demonstrated  · GI input appreciated- suspected infectious in etiology  · Stool cultures- negative; C difficile-negative  · 3/30-- the patient continues to have 6-7 episodes of nonbloody diarrhea since this a m ; she however has not been compliant with diet  The patient had salad, potato chips, Magen  for lunch  Diet changed to GI low-fat, low residue  I discussed diet option with the patient in details  Started on probiotics  · 3/31-- diarrhea is improving  Dietitian input appreciated     · 4/1-- diarrhea much improved

## 2022-04-02 NOTE — PROGRESS NOTES
[facility]  Progress Note - Frankey Dross 1960, 63 y o  female MRN: [id number]  Unit/Bed#: -01 Encounter: [id number]  Primary Care Provider: LETTY Tamez   Date and time admitted to hospital: 3/28/2022  2:52 AM    * Acute kidney injury [facility])  Assessment & Plan  · Presents with generalized weakness and abdominal pain  · Creatinine 1 73 on admission, recent baseline appears to be around 1 1 mg/dL  Suspect prerenal due to GI losses and possible ATN   · Received 1 L normal saline in the ER; received IVF   · Nephrology input appreciated    · Will monitor renal function closely  · Avoid nephrotoxins and hypotension       Mucositis  Assessment & Plan  · POA with complains of hands and feet tenderness/cracking, erythema and blistering  Patient reports that this has been going on for the past few days even before she came into the hospital   states that patient has been having "joint pain", shakiness of the hands for almost a month  · Unclear etiology   · 4/1-- complaining about "skin color" changes which has been going on since January especially on her feet/legs   · Will obtain arterial duplex and venous duplex - pending   · Working up for possible autoimmune issues  · ANCA- pending, SUKHDEEP- negative, CRP 10 9, ESR 14  · Supportive care, wound care input appreciated, p r n  lidocaine swish and swallow  · Will need outpatient rheumatology evaluation outpatient     Bradycardia  Assessment & Plan  · With h/o tachycardia   · Hemodynamic stable  · Decreased Toprol to 50 mg daily instead of 100 daily  · Much improved      Macrocytic anemia  Assessment & Plan  · With acute blood loss anemia suspected to be due to GI loss  The patient previously denies any bright red blood per rectum or dark stools on admission however now stating that she has been having "dark stools" over the past 1-2 weeks    The patient reports that she had blood transfusions for 6 months in 2000 (gastric bypass 1998) due to anemia  EGD and colonoscopy was done over 20 years ago  · Hemoglobin 8 6, unclear baseline  Was 11 6 in 2020  · Folate 2 1- started a supplement; vitamin B12- normal  · Iron panel results appreciated  · GI input appreciated  · Started on PPI IV b i d   · Obtain occult blood  · Planned for EGD and colonoscopy by GI on Monday  · Monitor H&H closely and transfuse to keep hemoglobin greater than 7  · 4/1- 2 units of packed red blood cells and Benadryl due to itching and hives  · Monitor CBC closely     Acute cystitis without hematuria  Assessment & Plan  · Urinalysis positive for leukocyte esterase, leukocytes, and bacteria  · Treated with ceftriaxone in ER-- completed 3 day course  · Urine culture- E coli        Gastroenteritis  Assessment & Plan  · Presents with generalized weakness and left upper quadrant abdominal pain in the setting of recent viral illness, history of gastric bypass in 1998  · She has not been able to keep down any oral intake for 5-6 days  · COVID negative  · Mild transaminitis-- improving   · CT abdomen pelvis: Findings as above suspicious for gastritis/duodenitis, i e  peptic ulcer disease, in the proper clinical setting  Hepatic steatosis re-demonstrated  · GI input appreciated- suspected infectious in etiology  · Stool cultures- negative; C difficile-negative  · 3/30-- the patient continues to have 6-7 episodes of nonbloody diarrhea since this a m ; she however has not been compliant with diet  The patient had salad, potato chips, Charlette Mannie for lunch  Diet changed to GI low-fat, low residue  I discussed diet option with the patient in details  Started on probiotics  · 3/31-- diarrhea is improving  Dietitian input appreciated     · 4/1-- diarrhea much improved     Hypomagnesemia  Assessment & Plan  · Continue to replete and monitor        Hypokalemia  Assessment & Plan  · Continue to replete and monitor        Hyponatremia  Assessment & Plan  · Sodium 120 --> 133-135  · Suspected hypovolemia hyponatremia with concurrent use of thiazide diuresis with GI losses  · Nephrology input appreciated-continue with IV fluid for volume expansion  · TSH 5 879     · Monitor sodium level closely    Essential hypertension  Assessment & Plan  · BP reviewed  · Continue metoprolol decreased to 50 mg daily instead 100 mg daily   · Hold ACE-inhibitor for now due to BALJINDER  · Monitor blood pressure          VTE Prophylaxis:  SCDs only due acute on chronic anemia ruling out GI bleed    Patient Centered Rounds: I have performed bedside rounds with nursing staff today  Discussions with Specialists or Other Care Team Provider:  GI, Nephrology  Education and Discussions with Family / Patient:  Patient    Current Length of Stay: 5 day(s)    Current Patient Status: Inpatient   Certification Statement: The patient will continue to require additional inpatient hospital stay due to Acute kidney injury    Discharge Plan:  Pending hospital course    Code Status: Level 1 - Full Code    Subjective:   Feeling better  Some abdominal pain today burning feeling  Denies any nausea or vomiting  Diarrhea is much improved  Able to tolerate food  Objective:     Vitals:   Temp (24hrs), Av 1 °F (36 7 °C), Min:97 8 °F (36 6 °C), Max:98 8 °F (37 1 °C)    Temp:  [97 8 °F (36 6 °C)-98 8 °F (37 1 °C)] 97 9 °F (36 6 °C)  HR:  [62-92] 62  Resp:  [18-20] 18  BP: (126-163)/(53-83) 146/68  SpO2:  [98 %-100 %] 99 %  Body mass index is 31 64 kg/m²  Input and Output Summary (last 24 hours): Intake/Output Summary (Last 24 hours) at 2022 1415  Last data filed at 2022 0548  Gross per 24 hour   Intake 2159 58 ml   Output --   Net 2159 58 ml       Physical Exam:   Physical Exam  Vitals and nursing note reviewed  Constitutional:       General: She is not in acute distress  Appearance: Normal appearance  HENT:      Head: Normocephalic and atraumatic        Right Ear: External ear normal  Left Ear: External ear normal       Nose: Nose normal  No rhinorrhea  Mouth/Throat:      Mouth: Mucous membranes are moist       Pharynx: Oropharynx is clear  Eyes:      General:         Right eye: No discharge  Left eye: No discharge  Pupils: Pupils are equal, round, and reactive to light  Cardiovascular:      Rate and Rhythm: Normal rate and regular rhythm  Pulses: Normal pulses  Heart sounds: Normal heart sounds  No murmur heard  Pulmonary:      Effort: Pulmonary effort is normal  No respiratory distress  Breath sounds: Normal breath sounds  Abdominal:      General: Bowel sounds are normal  There is no distension  Palpations: Abdomen is soft  There is no mass  Tenderness: There is abdominal tenderness (mild on epigastric)  Musculoskeletal:         General: Swelling (some on hands and feet) present  No tenderness  Normal range of motion  Cervical back: Normal range of motion and neck supple  No muscular tenderness  Skin:     General: Skin is warm and dry  Capillary Refill: Capillary refill takes less than 2 seconds  Findings: No erythema or rash  Neurological:      General: No focal deficit present  Mental Status: She is alert and oriented to person, place, and time  Mental status is at baseline  Psychiatric:         Mood and Affect: Mood normal          Behavior: Behavior normal          Thought Content: Thought content normal          Judgment: Judgment normal          Additional Data:     Labs:    Results from last 7 days   Lab Units 04/02/22  0504 04/01/22  0536 03/31/22  1308   WBC Thousand/uL 4 45   < > 6 12   HEMOGLOBIN g/dL 8 9*   < > 7 1*   HEMATOCRIT % 27 3*   < > 21 9*   PLATELETS Thousands/uL 175   < > 229   NEUTROS PCT %  --   --  48   LYMPHS PCT %  --   --  33   MONOS PCT %  --   --  15*   EOS PCT %  --   --  4    < > = values in this interval not displayed       Results from last 7 days   Lab Units 04/02/22  0504 04/01/22  0535 04/01/22  0535   SODIUM mmol/L 133*   < > 135   POTASSIUM mmol/L 4 0   < > 4 2   CHLORIDE mmol/L 104   < > 104   CO2 mmol/L 20*   < > 23   BUN mg/dL 10   < > 10   CREATININE mg/dL 1 39*   < > 1 40*   CALCIUM mg/dL 8 5   < > 8 2*   ALK PHOS U/L  --   --  91   ALT U/L  --   --  16   AST U/L  --   --  22    < > = values in this interval not displayed  * I Have Reviewed All Lab Data Listed Above  * Additional Pertinent Lab Tests Reviewed:  Param 66 Admission  Reviewed    Imaging:  Imaging Reports Reviewed Today Include: n/a     Recent Cultures (last 7 days):     Results from last 7 days   Lab Units 03/29/22  0609 03/28/22  0448   URINE CULTURE   --  >100,000 cfu/ml Escherichia coli*   C DIFF TOXIN B BY PCR  Negative  --        Last 24 Hours Medication List:   Current Facility-Administered Medications   Medication Dose Route Frequency Provider Last Rate    acetaminophen  650 mg Oral Q6H PRN Donna Sheehan PA-C      [START ON 4/3/2022] bisacodyl  5 mg Oral Once Lola Diggs,       busPIRone  10 mg Oral BID Brooks Bulla, CRLEA      diphenhydrAMINE  25 mg Oral Q6H PRN Donnayadira Sheehan PA-C      fluticasone  2 spray Each Nare Daily Brooks Bulla, CRNP      folic acid  1 mg Oral Daily Brooks Bulla, CRLEA      guaiFENesin  200 mg Oral Q4H PRN Shahram Leyva PA-C      Lidocaine Viscous HCl  15 mL Swish & Spit 4x Daily PRN Brooks Bulla, CRNP      loperamide  2 mg Oral TID PRN Brooks Bulla, CRNP      metoprolol succinate  50 mg Oral Daily Brooks Bulla, CRNP      ondansetron  4 mg Intravenous Q6H PRN Donna Aguila, PA-RUBIN      pantoprazole  40 mg Intravenous Q12H [facility] LETTY Lynch      [START ON 4/3/2022] polyethylene glycol  238 g Oral Once Lola Diggs, DO      saccharomyces boulardii  250 mg Oral BID Brooks Bulla, CRNP      sodium bicarbonate  650 mg Oral BID after meals Deborah Barahona MD          Today, Patient Was Seen By: LETTY Stone    ** Please Note: Dictation voice to text software may have been used in the creation of this document   **

## 2022-04-03 LAB
ANION GAP SERPL CALCULATED.3IONS-SCNC: 11 MMOL/L (ref 4–13)
BUN SERPL-MCNC: 9 MG/DL (ref 5–25)
CALCIUM SERPL-MCNC: 8.5 MG/DL (ref 8.4–10.2)
CHLORIDE SERPL-SCNC: 102 MMOL/L (ref 96–108)
CO2 SERPL-SCNC: 22 MMOL/L (ref 21–32)
CREAT SERPL-MCNC: 1.62 MG/DL (ref 0.6–1.3)
GFR SERPL CREATININE-BSD FRML MDRD: 34 ML/MIN/1.73SQ M
GLUCOSE SERPL-MCNC: 74 MG/DL (ref 65–140)
HCT VFR BLD AUTO: 23.3 % (ref 34.8–46.1)
HEMOCCULT STL QL: POSITIVE
HGB BLD-MCNC: 7.9 G/DL (ref 11.5–15.4)
MCH RBC QN AUTO: 35.1 PG (ref 26.8–34.3)
MCHC RBC AUTO-ENTMCNC: 33.9 G/DL (ref 31.4–37.4)
MCV RBC AUTO: 104 FL (ref 82–98)
PLATELET # BLD AUTO: 178 THOUSANDS/UL (ref 149–390)
PMV BLD AUTO: 9 FL (ref 8.9–12.7)
POTASSIUM SERPL-SCNC: 3.9 MMOL/L (ref 3.5–5.3)
RBC # BLD AUTO: 2.25 MILLION/UL (ref 3.81–5.12)
SODIUM SERPL-SCNC: 135 MMOL/L (ref 135–147)
WBC # BLD AUTO: 6.47 THOUSAND/UL (ref 4.31–10.16)

## 2022-04-03 PROCEDURE — 80048 BASIC METABOLIC PNL TOTAL CA: CPT | Performed by: NURSE PRACTITIONER

## 2022-04-03 PROCEDURE — C9113 INJ PANTOPRAZOLE SODIUM, VIA: HCPCS | Performed by: NURSE PRACTITIONER

## 2022-04-03 PROCEDURE — 82272 OCCULT BLD FECES 1-3 TESTS: CPT | Performed by: NURSE PRACTITIONER

## 2022-04-03 PROCEDURE — 99232 SBSQ HOSP IP/OBS MODERATE 35: CPT | Performed by: INTERNAL MEDICINE

## 2022-04-03 PROCEDURE — 99232 SBSQ HOSP IP/OBS MODERATE 35: CPT | Performed by: NURSE PRACTITIONER

## 2022-04-03 PROCEDURE — 85027 COMPLETE CBC AUTOMATED: CPT | Performed by: NURSE PRACTITIONER

## 2022-04-03 RX ORDER — SODIUM CHLORIDE 9 MG/ML
75 INJECTION, SOLUTION INTRAVENOUS CONTINUOUS
Status: DISCONTINUED | OUTPATIENT
Start: 2022-04-03 | End: 2022-04-04

## 2022-04-03 RX ADMIN — SODIUM CHLORIDE 75 ML/HR: 0.9 INJECTION, SOLUTION INTRAVENOUS at 10:50

## 2022-04-03 RX ADMIN — PANTOPRAZOLE SODIUM 40 MG: 40 INJECTION, POWDER, FOR SOLUTION INTRAVENOUS at 21:49

## 2022-04-03 RX ADMIN — SODIUM BICARBONATE 650 MG: 650 TABLET ORAL at 08:43

## 2022-04-03 RX ADMIN — FLUTICASONE PROPIONATE 2 SPRAY: 50 SPRAY, METERED NASAL at 08:44

## 2022-04-03 RX ADMIN — GUAIFENESIN 200 MG: 200 SOLUTION ORAL at 21:49

## 2022-04-03 RX ADMIN — Medication 238 G: at 18:11

## 2022-04-03 RX ADMIN — BUSPIRONE HYDROCHLORIDE 10 MG: 5 TABLET ORAL at 08:44

## 2022-04-03 RX ADMIN — PANTOPRAZOLE SODIUM 40 MG: 40 INJECTION, POWDER, FOR SOLUTION INTRAVENOUS at 08:43

## 2022-04-03 RX ADMIN — Medication 250 MG: at 17:05

## 2022-04-03 RX ADMIN — FOLIC ACID 1 MG: 1 TABLET ORAL at 08:43

## 2022-04-03 RX ADMIN — METOPROLOL SUCCINATE 50 MG: 50 TABLET, EXTENDED RELEASE ORAL at 08:43

## 2022-04-03 RX ADMIN — SODIUM BICARBONATE 650 MG: 650 TABLET ORAL at 17:06

## 2022-04-03 RX ADMIN — BUSPIRONE HYDROCHLORIDE 10 MG: 5 TABLET ORAL at 21:47

## 2022-04-03 RX ADMIN — BISACODYL 5 MG: 5 TABLET, COATED ORAL at 17:06

## 2022-04-03 RX ADMIN — Medication 250 MG: at 08:43

## 2022-04-03 NOTE — PROGRESS NOTES
[facility]  Progress Note - Homer Boucher 63 y o  female MRN: [id number]    Unit/Bed#: -01 Encounter: [id number]    Assessment/Plan:  Homer Boucher is a 63 y o  female who presents with symptoms of pain, nausea, vomiting, diarrhea thought to be likely due to an infectious gastroenteritis but also with anemia and elevated liver enzymes without clear explanation  Liver enzymes may be related to a viral process  Anemia could be related to occult GI bleeding  Her stool at this time do not appear to be overtly bloody and her symptoms have improved significantly  Her CBC today has a hemoglobin of 7 9, normal white blood cell count, normal platelet count  Her BMP is notable for creatinine of 1 62 but otherwise normal   Prior CMP with albumin of 2 8 but otherwise relatively normal liver enzymes  INR in February normal   CT scan from March 28th limited secondary to lack of contrast but suspicious for gastritis/duodenitis  Clear liquids today  NPO past midnight  Plan for EGD colonoscopy tomorrow and bowel prep currently ordered  Main  Maintain IV access and active type and screen  Trend CBC and transfuse as needed to maintain hemoglobin greater than 7, platelets greater than 50    Subjective:   Patient seen this morning with her  at the bedside  She has no complaints at this time  She denies any abdominal pain, nausea, vomiting, blood in the stool, black stools  She reports that her stool is a reddish-brown but it is not bloody  She is eager to undergo colonoscopy  Objective:     Vitals: Blood pressure 135/68, pulse 86, temperature 98 4 °F (36 9 °C), resp  rate 18, height 5' (1 524 m), weight 73 5 kg (162 lb), SpO2 99 %  ,Body mass index is 31 64 kg/m²        Intake/Output Summary (Last 24 hours) at 4/3/2022 0748  Last data filed at 4/2/2022 1523  Gross per 24 hour   Intake 400 ml   Output --   Net 400 ml       Review of Systems: as per HPI  10 point ROS reviewed and negative, except as above    PHYSICAL EXAMINATION:    General Appearance:   Alert, cooperative, no distress   HEENT:  Normocephalic, atraumatic, anicteric  Neck supple, symmetrical, trachea midline  Lungs:   Equal chest rise and unlabored breathing, normal effort, no coughing  Cardiovascular:   No visualized JVD  Abdomen:   No abdominal distension  Skin:   No jaundice, rashes, or lesions  Musculoskeletal:   Normal range of motion visualized  Psych:  Normal affect and normal insight  Neuro:  Alert and appropriate  Invasive Devices  Report    Peripheral Intravenous Line            Peripheral IV 04/03/22 Dorsal (posterior); Left Forearm <1 day                        CBC:   Lab Results   Component Value Date    WBC 6 47 04/03/2022    HGB 7 9 (L) 04/03/2022    HCT 23 3 (L) 04/03/2022     (H) 04/03/2022     04/03/2022    MCH 35 1 (H) 04/03/2022    MCHC 33 9 04/03/2022    MPV 9 0 04/03/2022   ,   CMP:   Lab Results   Component Value Date    K 3 9 04/03/2022     04/03/2022    CO2 22 04/03/2022    BUN 9 04/03/2022    CREATININE 1 62 (H) 04/03/2022    CALCIUM 8 5 04/03/2022    EGFR 34 04/03/2022   ,   Lipase: No results found for: LIPASE,  PT/INR: No results found for: PT, INR,   Troponin: No results found for: TROPONINI,   Magnesium: No components found for: MAG,   Phosphorous: No results found for: PHOS  Imaging Studies: I have personally reviewed pertinent reports

## 2022-04-03 NOTE — PROGRESS NOTES
Progress Note- Chester Correa 63 y o  female MRN: [id number]    Unit/Bed#: -01 Encounter: [id number]      Assessment and Plan:    Nausea, vomiting, diarrhea, and abdominal pain:  She presented with multiple gastrointestinal complaints most likely due to a gastroenteritis  She also has elevated liver enzymes and anemia without a clear etiology  There is a question of black colored stools but her hemoglobin has remained stable  We are planning to prep on Sunday for upper endoscopy and colonoscopy on Monday for further evaluation  ______________________________________________________________________    Subjective:     She feels her pain and diarrhea have improved but not completely resolved  She said she is feeling much better      Medication Administration - last 24 hours from 04/01/2022 2234 to 04/02/2022 2234       Date/Time Order Dose Route Action Action by     04/02/2022 2156 acetaminophen (TYLENOL) tablet 650 mg 650 mg Oral Given Glenn Juárez RN     04/02/2022 1202 acetaminophen (TYLENOL) tablet 650 mg 650 mg Oral Given Yayn Call RN     04/02/2022 0543 acetaminophen (TYLENOL) tablet 650 mg 650 mg Oral Given Byron Le RN     04/02/2022 2155 guaiFENesin (ROBITUSSIN) oral solution 200 mg 200 mg Oral Given Glenn Juárez RN     72/73/4710 8109 folic acid (FOLVITE) tablet 1 mg 1 mg Oral Given Yany Call RN     04/02/2022 1523 multi-electrolyte (PLASMALYTE-A/ISOLYTE-S PH 7 4) IV solution 0 mL/hr Intravenous Stopped Yany Call RN     04/02/2022 8348 multi-electrolyte (PLASMALYTE-A/ISOLYTE-S PH 7 4) IV solution 100 mL/hr Intravenous [address] [facility] RENUKA Silva     04/02/2022 1704 saccharomyces boulardii (FLORASTOR) capsule 250 mg 250 mg Oral Given Yany Call RN     04/02/2022 2650 saccharomyces boulardii (FLORASTOR) capsule 250 mg 250 mg Oral Given Yany Call RN     04/02/2022 0844 fluticasone (FLONASE) 50 mcg/act nasal spray 2 spray 2 spray Each Nare Given Amarjit Rico RN     04/02/2022 3925 metoprolol succinate (TOPROL-XL) 24 hr tablet 50 mg 50 mg Oral Given Amarjit Rico RN     04/02/2022 2156 busPIRone (BUSPAR) tablet 10 mg 10 mg Oral Given Ellen Earl RN     04/02/2022 0844 busPIRone (BUSPAR) tablet 10 mg 10 mg Oral Given Amarjit Rico RN     04/02/2022 2156 pantoprazole (PROTONIX) injection 40 mg 40 mg Intravenous Given Ellen Earl RN     04/02/2022 0844 pantoprazole (PROTONIX) injection 40 mg 40 mg Intravenous Given Amarjit Rico RN     04/02/2022 1704 sodium bicarbonate tablet 650 mg 650 mg Oral Given Amarjit Rico RN     04/02/2022 1123 sodium bicarbonate tablet 650 mg 650 mg Oral Given Amarjit Rico RN          Objective:     Vitals: Blood pressure 150/77, pulse 87, temperature 98 1 °F (36 7 °C), resp  rate 20, height 5' (1 524 m), weight 73 5 kg (162 lb), SpO2 97 %  ,Body mass index is 31 64 kg/m²  Intake/Output Summary (Last 24 hours) at 4/2/2022 2234  Last data filed at 4/2/2022 1523  Gross per 24 hour   Intake 1900 ml   Output --   Net 1900 ml       Physical Exam:   General Appearance: Awake and alert, in no acute distress  Abdomen: Soft, mild epigastric tenderness, non-distended; bowel sounds normal; no masses or no organomegaly    Invasive Devices  Report    Peripheral Intravenous Line            Peripheral IV 04/01/22 Right Forearm 1 day                Lab Results:  No results displayed because visit has over 200 results  Imaging Studies: I have personally reviewed pertinent imaging studies

## 2022-04-03 NOTE — PLAN OF CARE
Problem: PAIN - ADULT  Goal: Verbalizes/displays adequate comfort level or baseline comfort level  Description: Interventions:  - Encourage patient to monitor pain and request assistance  - Assess pain using appropriate pain scale  - Administer analgesics based on type and severity of pain and evaluate response  - Implement non-pharmacological measures as appropriate and evaluate response  - Consider cultural and social influences on pain and pain management  - Notify physician/advanced practitioner if interventions unsuccessful or patient reports new pain  Outcome: Progressing     Problem: INFECTION - ADULT  Goal: Absence or prevention of progression during hospitalization  Description: INTERVENTIONS:  - Assess and monitor for signs and symptoms of infection  - Monitor lab/diagnostic results  - Monitor all insertion sites, i e  indwelling lines, tubes, and drains  - Monitor endotracheal if appropriate and nasal secretions for changes in amount and color  - [place] appropriate cooling/warming therapies per order  - Administer medications as ordered  - Instruct and encourage patient and family to use good hand hygiene technique  - Identify and instruct in appropriate isolation precautions for identified infection/condition  Outcome: Progressing  Goal: Absence of fever/infection during neutropenic period  Description: INTERVENTIONS:  - Monitor WBC    Outcome: Progressing     Problem: DISCHARGE PLANNING  Goal: Discharge to home or other facility with appropriate resources  Description: INTERVENTIONS:  - Identify barriers to discharge w/patient and caregiver  - Arrange for needed discharge resources and transportation as appropriate  - Identify discharge learning needs (meds, wound care, etc )  - Arrange for interpretive services to assist at discharge as needed  - Refer to Case Management Department for coordinating discharge planning if the patient needs post-hospital services based on physician/advanced practitioner order or complex needs related to functional status, cognitive ability, or social support system  Outcome: Progressing     Problem: Knowledge Deficit  Goal: Patient/family/caregiver demonstrates understanding of disease process, treatment plan, medications, and discharge instructions  Description: Complete learning assessment and assess knowledge base    Interventions:  - Provide teaching at level of understanding  - Provide teaching via preferred learning methods  Outcome: Progressing     Problem: Potential for Falls  Goal: Patient will remain free of falls  Description: INTERVENTIONS:  - Educate patient/family on patient safety including physical limitations  - Instruct patient to call for assistance with activity   - Consult OT/PT to assist with strengthening/mobility   - Keep Call bell within reach  - Keep bed low and locked with side rails adjusted as appropriate  - Keep care items and personal belongings within reach  - Initiate and maintain comfort rounds  - Apply yellow socks and bracelet for high fall risk patients  - Consider moving patient to room near nurses station  Outcome: Progressing     Problem: HEMATOLOGIC - ADULT  Goal: Maintains hematologic stability  Description: INTERVENTIONS  - Assess for signs and symptoms of bleeding or hemorrhage  - Monitor labs  - Administer supportive blood products/factors as ordered and appropriate  Outcome: Progressing

## 2022-04-03 NOTE — ASSESSMENT & PLAN NOTE
· Presents with generalized weakness and left upper quadrant abdominal pain in the setting of recent viral illness, history of gastric bypass in 1998  · She has not been able to keep down any oral intake for 5-6 days  · COVID negative  · Mild transaminitis-- improving   · CT abdomen pelvis: Findings as above suspicious for gastritis/duodenitis, i e  peptic ulcer disease, in the proper clinical setting  Hepatic steatosis re-demonstrated     · GI input appreciated- suspected infectious in etiology   · Stool cultures- negative; C difficile-negative  · 4/3 diarrhea much improved and nearly resolved

## 2022-04-03 NOTE — PLAN OF CARE
Problem: PAIN - ADULT  Goal: Verbalizes/displays adequate comfort level or baseline comfort level  Description: Interventions:  - Encourage patient to monitor pain and request assistance  - Assess pain using appropriate pain scale  - Administer analgesics based on type and severity of pain and evaluate response  - Implement non-pharmacological measures as appropriate and evaluate response  - Consider cultural and social influences on pain and pain management  - Notify physician/advanced practitioner if interventions unsuccessful or patient reports new pain  Outcome: Progressing     Problem: INFECTION - ADULT  Goal: Absence or prevention of progression during hospitalization  Description: INTERVENTIONS:  - Assess and monitor for signs and symptoms of infection  - Monitor lab/diagnostic results  - Monitor all insertion sites, i e  indwelling lines, tubes, and drains  - Monitor endotracheal if appropriate and nasal secretions for changes in amount and color  - [place] appropriate cooling/warming therapies per order  - Administer medications as ordered  - Instruct and encourage patient and family to use good hand hygiene technique  - Identify and instruct in appropriate isolation precautions for identified infection/condition  Outcome: Progressing  Goal: Absence of fever/infection during neutropenic period  Description: INTERVENTIONS:  - Monitor WBC    Outcome: Progressing     Problem: DISCHARGE PLANNING  Goal: Discharge to home or other facility with appropriate resources  Description: INTERVENTIONS:  - Identify barriers to discharge w/patient and caregiver  - Arrange for needed discharge resources and transportation as appropriate  - Identify discharge learning needs (meds, wound care, etc )  - Arrange for interpretive services to assist at discharge as needed  - Refer to Case Management Department for coordinating discharge planning if the patient needs post-hospital services based on physician/advanced practitioner order or complex needs related to functional status, cognitive ability, or social support system  Outcome: Progressing     Problem: Knowledge Deficit  Goal: Patient/family/caregiver demonstrates understanding of disease process, treatment plan, medications, and discharge instructions  Description: Complete learning assessment and assess knowledge base    Interventions:  - Provide teaching at level of understanding  - Provide teaching via preferred learning methods  Outcome: Progressing     Problem: Potential for Falls  Goal: Patient will remain free of falls  Description: INTERVENTIONS:  - Educate patient/family on patient safety including physical limitations  - Instruct patient to call for assistance with activity   - Consult OT/PT to assist with strengthening/mobility   - Keep Call bell within reach  - Keep bed low and locked with side rails adjusted as appropriate  - Keep care items and personal belongings within reach  - Initiate and maintain comfort rounds  - Make Fall Risk Sign visible to staff  - Offer Toileting every 2 Hours, in advance of need  - Initiate/Maintain bed alarm  - Obtain necessary fall risk management equipment: bed   - Apply yellow socks and bracelet for high fall risk patients  - Consider moving patient to room near nurses station  Outcome: Progressing     Problem: HEMATOLOGIC - ADULT  Goal: Maintains hematologic stability  Description: INTERVENTIONS  - Assess for signs and symptoms of bleeding or hemorrhage  - Monitor labs  - Administer supportive blood products/factors as ordered and appropriate  Outcome: Progressing

## 2022-04-03 NOTE — PROGRESS NOTES
Progress Note - Nephrology   Frankey Dross 63 y o  female MRN: [id number]  Unit/Bed#: -01 Encounter: [id number]    A/P:  1  Acute kidney injury superimposed on chronic kidney disease present on admission   - creatinine has climbed to 1 62 mg/dL today  - she has been on clear liquids for the past 2 days and will continue   Today in preparation for colonoscopy  - will start IV fluid to 75 mils an hour once IV access has been obtained   - labs ordered for tomorrow morning    2  Chronic kidney disease stage 3   - historic baseline is approximately 1 2 mg/dL    3  Hypovolemic hyponatremia   - serum sodium is improved to 135 millimoles per L   - start normal saline infusion as above    4  Non-anion gap metabolic acidosis   - improved to 22 millimoles per L   - continue sodium bicarbonate 650 bid    5  Anemia   - will have a colonoscopy tomorrow       Follow up reason for today's visit:  Acute kidney injury superimposed on chronic kidney disease/hypovolemic hyponatremia/non-anion gap metabolic acidosis    Acute kidney injury [facility])    Patient Active Problem List   Diagnosis    Class 1 obesity due to excess calories without serious comorbidity with body mass index (BMI) of 33 0 to 33 9 in adult    Essential hypertension    Anxiety and depression    Pap smear for cervical cancer screening    Postmenopausal bleeding    Vaginal atrophy    At risk for loss of bone density    Dyspareunia, female    S/P gastric bypass    History of myocardial infarction    Arthritis of lumbar spine    Acute kidney injury ([address] )    Hyponatremia    Hypokalemia    Hypomagnesemia    Gastroenteritis    Painful tongue    Acute cystitis without hematuria    Macrocytic anemia    Bradycardia    Mucositis         Subjective:   A 10 point review of systems is unremarkable  She had an unremarkable night and notes were reviewed    Objective:     Vitals: Blood pressure 135/68, pulse 86, temperature 98 4 °F (36 9 °C), resp   rate 18, height 5' (1 524 m), weight 73 5 kg (162 lb), SpO2 99 %  ,Body mass index is 31 64 kg/m²  Weight (last 2 days)     None            Intake/Output Summary (Last 24 hours) at 4/3/2022 1025  Last data filed at 4/2/2022 1523  Gross per 24 hour   Intake 400 ml   Output --   Net 400 ml     I/O last 3 completed shifts: In: 2320 [P O :420; I V :1600; Blood:300]  Out: -          Physical Exam: /68   Pulse 86   Temp 98 4 °F (36 9 °C)   Resp 18   Ht 5' (1 524 m)   Wt 73 5 kg (162 lb)   SpO2 99%   BMI 31 64 kg/m²     General Appearance:    Alert, cooperative walking around room in NAD appears stated age   Head:    Normocephalic, without obvious abnormality, atraumatic   Eyes:    Conjunctiva/corneas clear   Ears:    Normal external ears   Nose:   Nares normal, septum midline, mucosa normal, no drainage    or sinus tenderness   Throat:   Lips, mucosa, and tongue normal; teeth and gums normal   Neck:   Supple, symmetrical, trachea midline, no adenopathy;        thyroid:  No enlargement/tenderness/nodules; no carotid    bruit or JVD   Back:     Symmetric, no curvature, ROM normal, no CVA tenderness   Lungs:     Clear to auscultation bilaterally, respirations unlabored   Chest wall:    No tenderness or deformity   Heart:    Regular rate and rhythm, S1 and S2 normal, no murmur, rub   or gallop   Abdomen:     Soft, non-tender, bowel sounds active   Extremities:   Extremities normal, atraumatic, no cyanosis dec LE edema   Skin:   Skin color, texture, turgor normal, no rashes or lesions   Lymph nodes:   Cervical normal   Neurologic:   CNII-XII intact            Lab, Imaging and other studies: I have personally reviewed pertinent labs    CBC:   Lab Results   Component Value Date    WBC 6 47 04/03/2022    HGB 7 9 (L) 04/03/2022    HCT 23 3 (L) 04/03/2022     (H) 04/03/2022     04/03/2022    MCH 35 1 (H) 04/03/2022    MCHC 33 9 04/03/2022    MPV 9 0 04/03/2022     CMP:   Lab Results   Component Value Date K 3 9 04/03/2022     04/03/2022    CO2 22 04/03/2022    BUN 9 04/03/2022    CREATININE 1 62 (H) 04/03/2022    CALCIUM 8 5 04/03/2022    EGFR 34 04/03/2022         Results from last 7 days   Lab Units 04/03/22  0631 04/02/22  0504 04/01/22  0535 03/29/22  2345 03/29/22  0334 03/28/22  0906 03/28/22  0324   POTASSIUM mmol/L 3 9 4 0 4 2   < > 3 0*   < > 3 2*   CHLORIDE mmol/L 102 104 104   < > 91*   < > 84*   CO2 mmol/L 22 20* 23   < > 23   < > 19*   BUN mg/dL 9 10 10   < > 16   < > 22   CREATININE mg/dL 1 62* 1 39* 1 40*   < > 1 52*   < > 1 73*   CALCIUM mg/dL 8 5 8 5 8 2*   < > 8 7   < > 8 8   ALK PHOS U/L  --   --  91  --  131*  --  157*   ALT U/L  --   --  16  --  21  --  31   AST U/L  --   --  22  --  31  --  55*    < > = values in this interval not displayed  Phosphorus: No results found for: PHOS  Magnesium: No results found for: MG  Urinalysis: No results found for: Georjean Shall, SPECGRAV, PHUR, LEUKOCYTESUR, NITRITE, PROTEINUA, GLUCOSEU, KETONESU, BILIRUBINUR, BLOODU  Ionized Calcium: No results found for: CAION  Coagulation: No results found for: PT, INR, APTT  Troponin: No results found for: TROPONINI  ABG: No results found for: PHART, BKP2JPQ, PO2ART, CRK8YQE, N2ZOJJXU, BEART, SOURCE  Radiology review:     IMAGING  Procedure: Echo complete w/ contrast if indicated    Result Date: 3/31/2022  Narrative: Ramón Sterling  Left Ventricle: Left ventricular cavity size is normal  Wall thickness is mildly increased  The left ventricular ejection fraction is 55%  Systolic function is normal  Although no diagnostic regional wall motion abnormality was identified, this possibility cannot be completely excluded on the basis of this study  Diastolic function is normal for age    Left Atrium: The atrium is mildly dilated    Mitral Valve: There is mild regurgitation    Tricuspid Valve: There is mild regurgitation         Current Facility-Administered Medications   Medication Dose Route Frequency    acetaminophen (TYLENOL) tablet 650 mg  650 mg Oral Q6H PRN    bisacodyl (DULCOLAX) EC tablet 5 mg  5 mg Oral Once    busPIRone (BUSPAR) tablet 10 mg  10 mg Oral BID    diphenhydrAMINE (BENADRYL) tablet 25 mg  25 mg Oral Q6H PRN    fluticasone (FLONASE) 50 mcg/act nasal spray 2 spray  2 spray Each Nare Daily    folic acid (FOLVITE) tablet 1 mg  1 mg Oral Daily    guaiFENesin (ROBITUSSIN) oral solution 200 mg  200 mg Oral Q4H PRN    Lidocaine Viscous HCl (XYLOCAINE) 2 % mucosal solution 15 mL  15 mL Swish & Spit 4x Daily PRN    loperamide (IMODIUM) capsule 2 mg  2 mg Oral TID PRN    metoprolol succinate (TOPROL-XL) 24 hr tablet 50 mg  50 mg Oral Daily    ondansetron (ZOFRAN) injection 4 mg  4 mg Intravenous Q6H PRN    pantoprazole (PROTONIX) injection 40 mg  40 mg Intravenous Q12H AJ    polyethylene glycol (GLYCOLAX) bowel prep 238 g  238 g Oral Once    saccharomyces boulardii (FLORASTOR) capsule 250 mg  250 mg Oral BID    sodium bicarbonate tablet 650 mg  650 mg Oral BID after meals     Medications Discontinued During This Encounter   Medication Reason    magnesium sulfate 4 g/100 mL IVPB (premix) 4 g     sodium chloride 0 9 % infusion     sodium chloride 0 9 % bolus 500 mL     sodium chloride 0 9 % infusion     busPIRone (BUSPAR) tablet 10 mg     losartan (COZAAR) tablet 100 mg     sodium chloride 0 9 % infusion     potassium chloride (K-DUR,KLOR-CON) CR tablet 40 mEq     busPIRone (BUSPAR) tablet 10 mg     cefTRIAXone (ROCEPHIN) IVPB (premix in dextrose) 1,000 mg 50 mL     pantoprazole (PROTONIX) injection 40 mg     Lidocaine Viscous HCl (XYLOCAINE) 2 % mucosal solution 15 mL     metoprolol succinate (TOPROL-XL) 24 hr tablet 100 mg     busPIRone (BUSPAR) tablet 10 mg     pantoprazole (PROTONIX) EC tablet 40 mg     sodium chloride 0 9 % with KCl 20 mEq/L infusion (premix)     heparin (porcine) subcutaneous injection 5,000 Units     multi-electrolyte (PLASMALYTE-A/ISOLYTE-S PH 7 4) IV solution Annabelle Randall MD      This progress note was produced in part using a dictation device which may document imprecise wording from author's original intent

## 2022-04-03 NOTE — PROGRESS NOTES
[address]  Progress Note - Ravindra Braden 1960, 63 y o  female MRN: [id number]  Unit/Bed#: -01 Encounter: [id number]  Primary Care Provider: LETTY Winston   Date and time admitted to hospital: 3/28/2022  2:52 AM    * Acute kidney injury [facility])  Assessment & Plan  · Presents with generalized weakness and abdominal pain  · Creatinine 1 73 on admission, recent baseline appears to be around 1 1 mg/dL  Suspect prerenal due to GI losses and possible ATN   · With non-anion gap metabolic acidosis-- improving  Continue with sodium bicarbonate 650 b i d   · Received 1 L normal saline in the ER; received IVF   · Nephrology input appreciated    · The patient is undergoing bowel prep for colonoscopy tomorrow; IV fluids started  · Will monitor renal function closely  · Avoid nephrotoxins and hypotension       Mucositis  Assessment & Plan  · POA with complains of hands and feet tenderness/cracking, erythema and blistering  Patient reports that this has been going on for the past few days even before she came into the hospital   states that patient has been having "joint pain", shakiness of the hands for almost a month  · Unclear etiology   · 4/1-- complaining about "skin color" changes which has been going on since January especially on her feet/legs   · Will obtain arterial duplex and venous duplex - pending   · Working up for possible autoimmune issues  · ANCA- pending, SUKHDEEP- negative, CRP 10 9, ESR 14  · Supportive care, wound care input appreciated, p r n  lidocaine swish and swallow  · Will need outpatient rheumatology evaluation outpatient       Bradycardia  Assessment & Plan  · With h/o tachycardia   · Hemodynamic stable  · Decreased Toprol to 50 mg daily instead of 100 daily   · Much improved       Macrocytic anemia  Assessment & Plan  · With acute blood loss anemia suspected to be due to GI loss    The patient previously denies any bright red blood per rectum or dark stools on admission however now stating that she has been having "dark stools" over the past 1-2 weeks  The patient reports that she had blood transfusions for 6 months in 2000 (gastric bypass 1998) due to anemia  EGD and colonoscopy was done over 20 years ago  · Hemoglobin 8 6, unclear baseline  Was 11 6 in 2020  · Folate 2 1- started a supplement; vitamin B12- normal  · Iron panel results appreciated  · GI input appreciated  · Started on PPI IV b i d   · Obtain occult blood   · Planned for EGD and colonoscopy by GI on Monday  · Monitor H&H closely and transfuse to keep hemoglobin greater than 7  · 4/1- 2 units of packed red blood cells and Benadryl due to itching and hives  · Monitor CBC closely     Acute cystitis without hematuria  Assessment & Plan  · Urinalysis positive for leukocyte esterase, leukocytes, and bacteria  · Treated with ceftriaxone in ER-- completed 3 day course  · Urine culture- E coli         Gastroenteritis  Assessment & Plan  · Presents with generalized weakness and left upper quadrant abdominal pain in the setting of recent viral illness, history of gastric bypass in 1998  · She has not been able to keep down any oral intake for 5-6 days  · COVID negative  · Mild transaminitis-- improving   · CT abdomen pelvis: Findings as above suspicious for gastritis/duodenitis, i e  peptic ulcer disease, in the proper clinical setting  Hepatic steatosis re-demonstrated  · GI input appreciated- suspected infectious in etiology   · Stool cultures- negative; C difficile-negative  · 4/3 diarrhea much improved and nearly resolved    Hypomagnesemia  Assessment & Plan  · Continue to replete and monitor         Hypokalemia  Assessment & Plan  · Continue to replete and monitor         Hyponatremia  Assessment & Plan  · Sodium 120 --> 133-135  · Suspected hypovolemia hyponatremia with concurrent use of thiazide diuresis with GI losses    · Nephrology input appreciated-continue with IV fluid for volume expansion  · TSH 5 879      · Monitor sodium level closely    Essential hypertension  Assessment & Plan  · BP reviewed  · Continue metoprolol decreased to 50 mg daily instead 100 mg daily   · Hold ACE-inhibitor for now due to BALJINDER   · Monitor blood pressure           VTE Prophylaxis:  SCDs only     Patient Centered Rounds: I have performed bedside rounds with nursing staff today  Discussions with Specialists or Other Care Team Provider: GI, renal   Education and Discussions with Family / Patient: patient and  at bedside     Current Length of Stay: 6 day(s)    Current Patient Status: Inpatient   Certification Statement: The patient will continue to require additional inpatient hospital stay due to gastroenteritis     Discharge Plan: pending hospital course     Code Status: Level 1 - Full Code    Subjective:   Feeling better  No n/v/abdominal  Diarrhea is improving  Objective:     Vitals:   Temp (24hrs), Av 3 °F (36 8 °C), Min:98 1 °F (36 7 °C), Max:98 4 °F (36 9 °C)    Temp:  [98 1 °F (36 7 °C)-98 4 °F (36 9 °C)] 98 4 °F (36 9 °C)  HR:  [86-87] 86  Resp:  [18-20] 18  BP: (135-150)/(68-77) 135/68  SpO2:  [97 %-99 %] 99 %  Body mass index is 31 64 kg/m²  Input and Output Summary (last 24 hours): Intake/Output Summary (Last 24 hours) at 4/3/2022 1408  Last data filed at 2022 1523  Gross per 24 hour   Intake 400 ml   Output --   Net 400 ml       Physical Exam:   Physical Exam  Vitals and nursing note reviewed  Constitutional:       General: She is not in acute distress  Appearance: Normal appearance  HENT:      Head: Normocephalic and atraumatic  Right Ear: External ear normal       Left Ear: External ear normal       Nose: Nose normal  No rhinorrhea  Mouth/Throat:      Mouth: Mucous membranes are moist       Pharynx: Oropharynx is clear  Eyes:      General:         Right eye: No discharge  Left eye: No discharge        Pupils: Pupils are equal, round, and reactive to light  Cardiovascular:      Rate and Rhythm: Normal rate and regular rhythm  Pulses: Normal pulses  Heart sounds: Normal heart sounds  No murmur heard  Pulmonary:      Effort: Pulmonary effort is normal  No respiratory distress  Breath sounds: Normal breath sounds  Abdominal:      General: Bowel sounds are normal  There is no distension  Palpations: Abdomen is soft  There is no mass  Tenderness: There is no abdominal tenderness  Musculoskeletal:         General: No swelling or tenderness  Normal range of motion  Cervical back: Normal range of motion and neck supple  No muscular tenderness  Skin:     General: Skin is warm and dry  Capillary Refill: Capillary refill takes less than 2 seconds  Coloration: Skin is pale  Findings: No erythema or rash  Neurological:      General: No focal deficit present  Mental Status: She is alert and oriented to person, place, and time  Mental status is at baseline  Psychiatric:         Mood and Affect: Mood normal          Behavior: Behavior normal          Thought Content: Thought content normal          Judgment: Judgment normal          Additional Data:     Labs:    Results from last 7 days   Lab Units 04/03/22  0631 04/01/22  0536 03/31/22  1308   WBC Thousand/uL 6 47   < > 6 12   HEMOGLOBIN g/dL 7 9*   < > 7 1*   HEMATOCRIT % 23 3*   < > 21 9*   PLATELETS Thousands/uL 178   < > 229   NEUTROS PCT %  --   --  48   LYMPHS PCT %  --   --  33   MONOS PCT %  --   --  15*   EOS PCT %  --   --  4    < > = values in this interval not displayed       Results from last 7 days   Lab Units 04/03/22  0631 04/02/22  0504 04/01/22  0535   SODIUM mmol/L 135   < > 135   POTASSIUM mmol/L 3 9   < > 4 2   CHLORIDE mmol/L 102   < > 104   CO2 mmol/L 22   < > 23   BUN mg/dL 9   < > 10   CREATININE mg/dL 1 62*   < > 1 40*   CALCIUM mg/dL 8 5   < > 8 2*   ALK PHOS U/L  --   --  91   ALT U/L  --   --  16   AST U/L  --   --  22 < > = values in this interval not displayed  * I Have Reviewed All Lab Data Listed Above  * Additional Pertinent Lab Tests Reviewed: Param 66 Admission  Reviewed    Imaging:  Imaging Reports Reviewed Today Include: n/a     Recent Cultures (last 7 days):     Results from last 7 days   Lab Units 03/29/22  0609 03/28/22  0448   URINE CULTURE   --  >100,000 cfu/ml Escherichia coli*   C DIFF TOXIN B BY PCR  Negative  --        Last 24 Hours Medication List:   Current Facility-Administered Medications   Medication Dose Route Frequency Provider Last Rate    acetaminophen  650 mg Oral Q6H PRN Brinda Rendon PA-C      bisacodyl  5 mg Oral Once [employer], DO      busPIRone  10 mg Oral BID LETTY Cheung      diphenhydrAMINE  25 mg Oral Q6H PRN Brinda Rendon PA-C      fluticasone  2 spray Each Nare Daily LETTY Cheung      folic acid  1 mg Oral Daily LETTY Cheung      guaiFENesin  200 mg Oral Q4H PRN Suad Alatorre PA-C      Lidocaine Viscous HCl  15 mL Swish & Spit 4x Daily PRN LETTY Cheung      loperamide  2 mg Oral TID PRN LETTY Cheung      metoprolol succinate  50 mg Oral Daily LETTY Cheung      ondansetron  4 mg Intravenous Q6H PRN Brinda Rendon PA-C      pantoprazole  40 mg Intravenous Q12H [address] LETTY Cheung      polyethylene glycol  238 g Oral Once [employer], DO      saccharomyces boulardii  250 mg Oral BID LETTY Cheung      sodium bicarbonate  650 mg Oral BID after meals Annabelle Randall MD      sodium chloride  75 mL/hr Intravenous Continuous Annabelle Randall MD 75 mL/hr (04/03/22 1050)        Today, Patient Was Seen By: LETTY Cheung    ** Please Note: Dictation voice to text software may have been used in the creation of this document   **

## 2022-04-03 NOTE — NURSING NOTE
New iv started per the pt request, pt tolerated well, presently ambulating in room in no acute distress eating clears and tolerating well

## 2022-04-03 NOTE — ASSESSMENT & PLAN NOTE
· Presents with generalized weakness and abdominal pain  · Creatinine 1 73 on admission, recent baseline appears to be around 1 1 mg/dL  Suspect prerenal due to GI losses and possible ATN   · With non-anion gap metabolic acidosis-- improving    Continue with sodium bicarbonate 650 b i d   · Received 1 L normal saline in the ER; received IVF   · Nephrology input appreciated    · The patient is undergoing bowel prep for colonoscopy tomorrow; IV fluids started  · Will monitor renal function closely  · Avoid nephrotoxins and hypotension

## 2022-04-04 ENCOUNTER — APPOINTMENT (INPATIENT)
Dept: NON INVASIVE DIAGNOSTICS | Facility: HOSPITAL | Age: 62
DRG: 249 | End: 2022-04-04
Payer: COMMERCIAL

## 2022-04-04 ENCOUNTER — ANESTHESIA EVENT (INPATIENT)
Dept: GASTROENTEROLOGY | Facility: HOSPITAL | Age: 62
DRG: 249 | End: 2022-04-04
Payer: COMMERCIAL

## 2022-04-04 ENCOUNTER — APPOINTMENT (INPATIENT)
Dept: GASTROENTEROLOGY | Facility: HOSPITAL | Age: 62
DRG: 249 | End: 2022-04-04
Payer: COMMERCIAL

## 2022-04-04 ENCOUNTER — APPOINTMENT (INPATIENT)
Dept: RADIOLOGY | Facility: HOSPITAL | Age: 62
DRG: 249 | End: 2022-04-04
Payer: COMMERCIAL

## 2022-04-04 ENCOUNTER — ANESTHESIA (INPATIENT)
Dept: GASTROENTEROLOGY | Facility: HOSPITAL | Age: 62
DRG: 249 | End: 2022-04-04
Payer: COMMERCIAL

## 2022-04-04 LAB
1,25(OH)2D3 SERPL-MCNC: 124 PG/ML (ref 19.9–79.3)
ANION GAP SERPL CALCULATED.3IONS-SCNC: 9 MMOL/L (ref 4–13)
BUN SERPL-MCNC: 6 MG/DL (ref 5–25)
C-ANCA TITR SER IF: NORMAL TITER
CALCIUM SERPL-MCNC: 8.2 MG/DL (ref 8.4–10.2)
CHLORIDE SERPL-SCNC: 108 MMOL/L (ref 96–108)
CO2 SERPL-SCNC: 20 MMOL/L (ref 21–32)
CREAT SERPL-MCNC: 1.17 MG/DL (ref 0.6–1.3)
GFR SERPL CREATININE-BSD FRML MDRD: 51 ML/MIN/1.73SQ M
GLUCOSE SERPL-MCNC: 80 MG/DL (ref 65–140)
HCT VFR BLD AUTO: 27.5 % (ref 34.8–46.1)
HGB BLD-MCNC: 8.9 G/DL (ref 11.5–15.4)
MCH RBC QN AUTO: 35.3 PG (ref 26.8–34.3)
MCHC RBC AUTO-ENTMCNC: 32.4 G/DL (ref 31.4–37.4)
MCV RBC AUTO: 109 FL (ref 82–98)
MYELOPEROXIDASE AB SER IA-ACNC: <9 U/ML (ref 0–9)
P-ANCA ATYPICAL TITR SER IF: NORMAL TITER
P-ANCA TITR SER IF: NORMAL TITER
PLATELET # BLD AUTO: 211 THOUSANDS/UL (ref 149–390)
PMV BLD AUTO: 8.6 FL (ref 8.9–12.7)
POTASSIUM SERPL-SCNC: 3.4 MMOL/L (ref 3.5–5.3)
PROTEINASE3 AB SER IA-ACNC: <3.5 U/ML (ref 0–3.5)
RBC # BLD AUTO: 2.52 MILLION/UL (ref 3.81–5.12)
SODIUM SERPL-SCNC: 137 MMOL/L (ref 135–147)
WBC # BLD AUTO: 5.58 THOUSAND/UL (ref 4.31–10.16)

## 2022-04-04 PROCEDURE — 45378 DIAGNOSTIC COLONOSCOPY: CPT | Performed by: STUDENT IN AN ORGANIZED HEALTH CARE EDUCATION/TRAINING PROGRAM

## 2022-04-04 PROCEDURE — 0DJD8ZZ INSPECTION OF LOWER INTESTINAL TRACT, VIA NATURAL OR ARTIFICIAL OPENING ENDOSCOPIC: ICD-10-PCS | Performed by: STUDENT IN AN ORGANIZED HEALTH CARE EDUCATION/TRAINING PROGRAM

## 2022-04-04 PROCEDURE — 99232 SBSQ HOSP IP/OBS MODERATE 35: CPT | Performed by: NURSE PRACTITIONER

## 2022-04-04 PROCEDURE — C9113 INJ PANTOPRAZOLE SODIUM, VIA: HCPCS | Performed by: NURSE PRACTITIONER

## 2022-04-04 PROCEDURE — 99232 SBSQ HOSP IP/OBS MODERATE 35: CPT | Performed by: PHYSICIAN ASSISTANT

## 2022-04-04 PROCEDURE — 80048 BASIC METABOLIC PNL TOTAL CA: CPT | Performed by: INTERNAL MEDICINE

## 2022-04-04 PROCEDURE — 93970 EXTREMITY STUDY: CPT | Performed by: SURGERY

## 2022-04-04 PROCEDURE — 93970 EXTREMITY STUDY: CPT

## 2022-04-04 PROCEDURE — 73080 X-RAY EXAM OF ELBOW: CPT

## 2022-04-04 PROCEDURE — 73030 X-RAY EXAM OF SHOULDER: CPT

## 2022-04-04 PROCEDURE — 0DJ08ZZ INSPECTION OF UPPER INTESTINAL TRACT, VIA NATURAL OR ARTIFICIAL OPENING ENDOSCOPIC: ICD-10-PCS | Performed by: STUDENT IN AN ORGANIZED HEALTH CARE EDUCATION/TRAINING PROGRAM

## 2022-04-04 PROCEDURE — 85027 COMPLETE CBC AUTOMATED: CPT | Performed by: NURSE PRACTITIONER

## 2022-04-04 PROCEDURE — 43235 EGD DIAGNOSTIC BRUSH WASH: CPT | Performed by: STUDENT IN AN ORGANIZED HEALTH CARE EDUCATION/TRAINING PROGRAM

## 2022-04-04 RX ORDER — PROPOFOL 10 MG/ML
INJECTION, EMULSION INTRAVENOUS AS NEEDED
Status: DISCONTINUED | OUTPATIENT
Start: 2022-04-04 | End: 2022-04-04

## 2022-04-04 RX ORDER — POTASSIUM CHLORIDE 14.9 MG/ML
20 INJECTION INTRAVENOUS ONCE
Status: COMPLETED | OUTPATIENT
Start: 2022-04-04 | End: 2022-04-04

## 2022-04-04 RX ORDER — ONDANSETRON 2 MG/ML
4 INJECTION INTRAMUSCULAR; INTRAVENOUS ONCE AS NEEDED
Status: DISCONTINUED | OUTPATIENT
Start: 2022-04-04 | End: 2022-04-05

## 2022-04-04 RX ADMIN — PROPOFOL 50 MG: 10 INJECTION, EMULSION INTRAVENOUS at 13:33

## 2022-04-04 RX ADMIN — PROPOFOL 50 MG: 10 INJECTION, EMULSION INTRAVENOUS at 13:24

## 2022-04-04 RX ADMIN — METOPROLOL SUCCINATE 50 MG: 50 TABLET, EXTENDED RELEASE ORAL at 17:14

## 2022-04-04 RX ADMIN — PROPOFOL 50 MG: 10 INJECTION, EMULSION INTRAVENOUS at 13:27

## 2022-04-04 RX ADMIN — Medication 250 MG: at 17:14

## 2022-04-04 RX ADMIN — PROPOFOL 50 MG: 10 INJECTION, EMULSION INTRAVENOUS at 13:30

## 2022-04-04 RX ADMIN — FOLIC ACID 1 MG: 1 TABLET ORAL at 17:14

## 2022-04-04 RX ADMIN — PANTOPRAZOLE SODIUM 40 MG: 40 INJECTION, POWDER, FOR SOLUTION INTRAVENOUS at 21:45

## 2022-04-04 RX ADMIN — POTASSIUM CHLORIDE 20 MEQ: 14.9 INJECTION, SOLUTION INTRAVENOUS at 09:43

## 2022-04-04 RX ADMIN — PANTOPRAZOLE SODIUM 40 MG: 40 INJECTION, POWDER, FOR SOLUTION INTRAVENOUS at 08:57

## 2022-04-04 RX ADMIN — FLUTICASONE PROPIONATE 2 SPRAY: 50 SPRAY, METERED NASAL at 08:57

## 2022-04-04 RX ADMIN — SODIUM BICARBONATE 650 MG: 650 TABLET ORAL at 17:14

## 2022-04-04 RX ADMIN — ACETAMINOPHEN 650 MG: 325 TABLET ORAL at 17:16

## 2022-04-04 RX ADMIN — PROPOFOL 50 MG: 10 INJECTION, EMULSION INTRAVENOUS at 13:35

## 2022-04-04 RX ADMIN — BUSPIRONE HYDROCHLORIDE 10 MG: 5 TABLET ORAL at 21:45

## 2022-04-04 RX ADMIN — PROPOFOL 50 MG: 10 INJECTION, EMULSION INTRAVENOUS at 13:23

## 2022-04-04 NOTE — PLAN OF CARE
Problem: PAIN - ADULT  Goal: Verbalizes/displays adequate comfort level or baseline comfort level  Description: Interventions:  - Encourage patient to monitor pain and request assistance  - Assess pain using appropriate pain scale  - Administer analgesics based on type and severity of pain and evaluate response  - Implement non-pharmacological measures as appropriate and evaluate response  - Consider cultural and social influences on pain and pain management  - Notify physician/advanced practitioner if interventions unsuccessful or patient reports new pain  Outcome: Progressing     Problem: INFECTION - ADULT  Goal: Absence or prevention of progression during hospitalization  Description: INTERVENTIONS:  - Assess and monitor for signs and symptoms of infection  - Monitor lab/diagnostic results  - Monitor all insertion sites, i e  indwelling lines, tubes, and drains  - Monitor endotracheal if appropriate and nasal secretions for changes in amount and color  - [place] appropriate cooling/warming therapies per order  - Administer medications as ordered  - Instruct and encourage patient and family to use good hand hygiene technique  - Identify and instruct in appropriate isolation precautions for identified infection/condition  Outcome: Progressing  Goal: Absence of fever/infection during neutropenic period  Description: INTERVENTIONS:  - Monitor WBC    Outcome: Progressing     Problem: DISCHARGE PLANNING  Goal: Discharge to home or other facility with appropriate resources  Description: INTERVENTIONS:  - Identify barriers to discharge w/patient and caregiver  - Arrange for needed discharge resources and transportation as appropriate  - Identify discharge learning needs (meds, wound care, etc )  - Arrange for interpretive services to assist at discharge as needed  - Refer to Case Management Department for coordinating discharge planning if the patient needs post-hospital services based on physician/advanced practitioner order or complex needs related to functional status, cognitive ability, or social support system  Outcome: Progressing     Problem: Knowledge Deficit  Goal: Patient/family/caregiver demonstrates understanding of disease process, treatment plan, medications, and discharge instructions  Description: Complete learning assessment and assess knowledge base    Interventions:  - Provide teaching at level of understanding  - Provide teaching via preferred learning methods  Outcome: Progressing     Problem: Potential for Falls  Goal: Patient will remain free of falls  Description: INTERVENTIONS:  - Educate patient/family on patient safety including physical limitations  - Instruct patient to call for assistance with activity   - Consult OT/PT to assist with strengthening/mobility   - Keep Call bell within reach  - Keep bed low and locked with side rails adjusted as appropriate  - Keep care items and personal belongings within reach  - Initiate and maintain comfort rounds  - Make Fall Risk Sign visible to staff  - Apply yellow socks and bracelet for high fall risk patients  - Consider moving patient to room near nurses station  Outcome: Progressing     Problem: HEMATOLOGIC - ADULT  Goal: Maintains hematologic stability  Description: INTERVENTIONS  - Assess for signs and symptoms of bleeding or hemorrhage  - Monitor labs  - Administer supportive blood products/factors as ordered and appropriate  Outcome: Progressing

## 2022-04-04 NOTE — ANESTHESIA POSTPROCEDURE EVALUATION
Post-Op Assessment Note    CV Status:  Stable  Pain Score: 0    Pain management: adequate     Mental Status:  Alert   Hydration Status:  Stable   PONV Controlled:  Controlled   Airway Patency:  Patent      Post Op Vitals Reviewed: Yes      Staff: CRNA         No complications documented      BP   126/57   Temp      Pulse 80   Resp 20   SpO2 98

## 2022-04-04 NOTE — ASSESSMENT & PLAN NOTE
· Presents with generalized weakness and left upper quadrant abdominal pain in the setting of recent viral illness, history of gastric bypass in 1998  · She has not been able to keep down any oral intake for 5-6 days  · COVID negative  · Mild transaminitis-- improved   · CT abdomen pelvis: Findings as above suspicious for gastritis/duodenitis, i e  peptic ulcer disease, in the proper clinical setting  Hepatic steatosis re-demonstrated     · GI input appreciated- suspected infectious in etiology   · Stool cultures- negative; C difficile-negative   · 4/4 diarrhea much improved and nearly resolved

## 2022-04-04 NOTE — NURSING NOTE
Pt down and back from egd/colonoscopy, tolerated well per the recovery rn, pt stated that she felt electricity shooting down her arm while bending over, no other c/o, no cp and or sob, vitals taken and charted , lonny garcia made aware, no new orders and will cont to monitor

## 2022-04-04 NOTE — ASSESSMENT & PLAN NOTE
· POA with complains of hands and feet tenderness/cracking, erythema and blistering  Patient reports that this has been going on for the past few days even before she came into the hospital   states that patient has been having "joint pain", shakiness of the hands for almost a month      · Unclear etiology   · 4/1-- complaining about "skin color" changes which has been going on since January especially on her feet/legs   · Will obtain arterial duplex and venous duplex - pending   · Working up for possible autoimmune issues  · ANCA- pending, SUKHDEEP- negative, CRP 10 9, ESR 14  · Supportive care, wound care input appreciated, p r n  lidocaine swish and swallow  · Outpatient referral rheumatology outpatient

## 2022-04-04 NOTE — ASSESSMENT & PLAN NOTE
· Presents with generalized weakness and abdominal pain  · Creatinine 1 73 on admission, recent baseline appears to be around 1 1 mg/dL  Suspect prerenal due to GI losses and possible ATN   · With non-anion gap metabolic acidosis-- improving    Continue with sodium bicarbonate 650 b i d   · Received 1 L normal saline in the ER; received IVF   · Nephrology input appreciated     · 4/4- EGD and colonoscopy; IV fluids started  · Will monitor renal function closely   · Avoid nephrotoxins and hypotension

## 2022-04-04 NOTE — ANESTHESIA PREPROCEDURE EVALUATION
Procedure:  COLONOSCOPY  EGD    Relevant Problems   CARDIO   (+) Essential hypertension      /RENAL   (+) Acute kidney injury ([facility] [address] )      HEMATOLOGY   (+) Macrocytic anemia      MUSCULOSKELETAL   (+) Arthritis of lumbar spine      NEURO/PSYCH   (+) Anxiety and depression   (+) History of myocardial infarction      Other   (+) Hyponatremia   (+) Mucositis   Hyponatremia now resolved, sodium now 137    Confirmed NPO appropriate    Physical Exam    Airway    Mallampati score: II  TM Distance: >3 FB  Neck ROM: full     Dental   No notable dental hx     Cardiovascular      Pulmonary      Other Findings        3/31/22 TTE:    Left Ventricle: Left ventricular cavity size is normal  Wall thickness is mildly increased  The left ventricular ejection fraction is 55%  Systolic function is normal  Although no diagnostic regional wall motion abnormality was identified, this possibility cannot be completely excluded on the basis of this study  Diastolic function is normal for age    Left Atrium: The atrium is mildly dilated    Mitral Valve: There is mild regurgitation    Tricuspid Valve: There is mild regurgitation  Anesthesia Plan  ASA Score- 3     Anesthesia Type- IV sedation with anesthesia with ASA Monitors  Additional Monitors:   Airway Plan:     Comment: I discussed the risks and benefits of IV sedation anesthesia including the possibility of the need to convert to general anesthesia and the potential risk of awareness  Plan Factors-Exercise tolerance (METS): >4 METS  Exercise comment: Able to climb 3-4 flights of stairs without cardiopulmonary limitation  Chart reviewed  EKG reviewed  Existing labs reviewed  Patient summary reviewed  Patient is not a current smoker  Patient did not smoke on day of surgery  Induction- intravenous  Postoperative Plan-     Informed Consent- Anesthetic plan and risks discussed with patient

## 2022-04-04 NOTE — PROGRESS NOTES
Progress Note - Nephrology   Margarito Bronson 63 y o  female MRN: [id number]  Unit/Bed#: -01 Encounter: [id number]    Assessment and Plan    1  Acute kidney injury, superimposed on chronic kidney disease, present admission  · Resolved to baseline  Follow with normal saline 75 mL/hr  Trend renal function  Continue to provide supportive care  Optimize hemodynamics  Maintain mean arterial pressure greater than 65 mmHg  Renally dose medications  Avoid nephrotoxins  Please discuss with renal any diuretics or possible nephrotoxic agents, such as NSAIDs or IV contrast  Recommend avoidance of PPIs in favor of H2 blocker, if appropriate for clinical scenario  Monitor for urinary retention- strict I&Os, record bladder scan PVRs and follow urinary retention protocol  2  Chronic kidney disease, stage III baseline creatinine 1 2 mg/dL  3  Hypovolemic hyponatremia  · Currently at goal   Continue to monitor and provide volume expansion as indicated  4  Non-anion gap metabolic acidosis  · Mildly worsened likely in the setting of normal saline administration  Continue sodium bicarbonate 650 mg by mouth twice daily  Recommend balanced IV fluid, such as Plasmalyte or Isolyte  5  Anemia  · For colonoscopy today      Follow up reason for today's visit:     Acute kidney injury [facility])    Patient Active Problem List   Diagnosis    Class 1 obesity due to excess calories without serious comorbidity with body mass index (BMI) of 33 0 to 33 9 in adult    Essential hypertension    Anxiety and depression    Pap smear for cervical cancer screening    Postmenopausal bleeding    Vaginal atrophy    At risk for loss of bone density    Dyspareunia, female    S/P gastric bypass    History of myocardial infarction    Arthritis of lumbar spine    Acute kidney injury ([facility] [address] )    Hyponatremia    Hypokalemia    Hypomagnesemia    Gastroenteritis    Painful tongue    Acute cystitis without hematuria    Macrocytic anemia  Bradycardia    Mucositis         Subjective:   Denies further diarrhea  Reports that she feels weak  Asks if she has diabetes in her kidneys given her strong family history  Review chart, last hemoglobin A1c 6 0%, no proteinuria  Patient was reassured  A complete 10 point review of systems was performed and is otherwise negative  Objective:     Vitals: Blood pressure 150/80, pulse 56, temperature 97 9 °F (36 6 °C), resp  rate 14, height 5' (1 524 m), weight 73 5 kg (162 lb), SpO2 90 %  ,Body mass index is 31 64 kg/m²  Weight (last 2 days)     None            Intake/Output Summary (Last 24 hours) at 4/4/2022 1135  Last data filed at 4/4/2022 0900  Gross per 24 hour   Intake 613 75 ml   Output 400 ml   Net 213 75 ml     I/O last 3 completed shifts: In: 613 8 [I V :613 8]  Out: -          Physical Exam: /80   Pulse 56   Temp 97 9 °F (36 6 °C)   Resp 14   Ht 5' (1 524 m)   Wt 73 5 kg (162 lb)   SpO2 90%   BMI 31 64 kg/m²     General Appearance:    No acute distress  Cooperative  Appears stated age  Obese appearing   Head:    Normocephalic  Atraumatic  Normal jaw occlusion  Eyes:    Lids, conjunctiva normal  No scleral icterus  Ears:    Normal external ears  Nose:   Nares normal  No drainage  Mouth:   Lips, tongue normal  Mucosa normal  Phonation normal    Neck:   Supple  Symmetrical    Back:     Symmetric  No CVA tenderness  Lungs:     Normal respiratory effort  Clear to auscultation bilaterally  Chest wall:    No tenderness or deformity  Heart:    Regular rate and rhythm  Normal S1 and S2  No murmur  No JVD  No edema  Abdomen:     Soft  Non-tender  Bowel sounds active  Genitourinary:   No Holly catheter present  Extremities:   Extremities normal  Atraumatic  No cyanosis  Skin:   Warm and dry  No pallor, jaundice, rash, ecchymoses  Neurologic:   Alert and oriented to person, place, time  No focal deficit              Lab, Imaging and other studies: I have personally reviewed pertinent labs  CBC:   Lab Results   Component Value Date    WBC 5 58 04/04/2022    HGB 8 9 (L) 04/04/2022    HCT 27 5 (L) 04/04/2022     (H) 04/04/2022     04/04/2022    MCH 35 3 (H) 04/04/2022    MCHC 32 4 04/04/2022    MPV 8 6 (L) 04/04/2022     CMP:   Lab Results   Component Value Date    K 3 4 (L) 04/04/2022     04/04/2022    CO2 20 (L) 04/04/2022    BUN 6 04/04/2022    CREATININE 1 17 04/04/2022    CALCIUM 8 2 (L) 04/04/2022    EGFR 51 04/04/2022         Results from last 7 days   Lab Units 04/04/22  0625 04/03/22  0631 04/02/22  0504 04/01/22  0535 04/01/22  0535 03/29/22  2345 03/29/22  0334   POTASSIUM mmol/L 3 4* 3 9 4 0   < > 4 2   < > 3 0*   CHLORIDE mmol/L 108 102 104   < > 104   < > 91*   CO2 mmol/L 20* 22 20*   < > 23   < > 23   BUN mg/dL 6 9 10   < > 10   < > 16   CREATININE mg/dL 1 17 1 62* 1 39*   < > 1 40*   < > 1 52*   CALCIUM mg/dL 8 2* 8 5 8 5   < > 8 2*   < > 8 7   ALK PHOS U/L  --   --   --   --  91  --  131*   ALT U/L  --   --   --   --  16  --  21   AST U/L  --   --   --   --  22  --  31    < > = values in this interval not displayed  Phosphorus: No results found for: PHOS  Magnesium: No results found for: MG  Urinalysis: No results found for: Garcia Pardon, SPECGRAV, PHUR, LEUKOCYTESUR, NITRITE, PROTEINUA, GLUCOSEU, KETONESU, BILIRUBINUR, BLOODU  Ionized Calcium: No results found for: CAION  Coagulation: No results found for: PT, INR, APTT  Troponin: No results found for: TROPONINI  ABG: No results found for: PHART, SJK2TGV, PO2ART, FZT1NWN, O9BGOVRT, BEART, SOURCE  Radiology review:     IMAGING  No results found      Current Facility-Administered Medications   Medication Dose Route Frequency    acetaminophen (TYLENOL) tablet 650 mg  650 mg Oral Q6H PRN    busPIRone (BUSPAR) tablet 10 mg  10 mg Oral BID    diphenhydrAMINE (BENADRYL) tablet 25 mg  25 mg Oral Q6H PRN    fluticasone (FLONASE) 50 mcg/act nasal spray 2 spray  2 spray Each Nare Daily  folic acid (FOLVITE) tablet 1 mg  1 mg Oral Daily    guaiFENesin (ROBITUSSIN) oral solution 200 mg  200 mg Oral Q4H PRN    Lidocaine Viscous HCl (XYLOCAINE) 2 % mucosal solution 15 mL  15 mL Swish & Spit 4x Daily PRN    loperamide (IMODIUM) capsule 2 mg  2 mg Oral TID PRN    metoprolol succinate (TOPROL-XL) 24 hr tablet 50 mg  50 mg Oral Daily    ondansetron (ZOFRAN) injection 4 mg  4 mg Intravenous Q6H PRN    pantoprazole (PROTONIX) injection 40 mg  40 mg Intravenous Q12H [address]    potassium chloride 20 mEq IVPB (premix)  20 mEq Intravenous Once    saccharomyces boulardii (FLORASTOR) capsule 250 mg  250 mg Oral BID    sodium bicarbonate tablet 650 mg  650 mg Oral BID after meals    sodium chloride 0 9 % infusion  75 mL/hr Intravenous Continuous     Medications Discontinued During This Encounter   Medication Reason    magnesium sulfate 4 g/100 mL IVPB (premix) 4 g     sodium chloride 0 9 % infusion     sodium chloride 0 9 % bolus 500 mL     sodium chloride 0 9 % infusion     busPIRone (BUSPAR) tablet 10 mg     losartan (COZAAR) tablet 100 mg     sodium chloride 0 9 % infusion     potassium chloride (K-DUR,KLOR-CON) CR tablet 40 mEq     busPIRone (BUSPAR) tablet 10 mg     cefTRIAXone (ROCEPHIN) IVPB (premix in dextrose) 1,000 mg 50 mL     pantoprazole (PROTONIX) injection 40 mg     Lidocaine Viscous HCl (XYLOCAINE) 2 % mucosal solution 15 mL     metoprolol succinate (TOPROL-XL) 24 hr tablet 100 mg     busPIRone (BUSPAR) tablet 10 mg     pantoprazole (PROTONIX) EC tablet 40 mg     sodium chloride 0 9 % with KCl 20 mEq/L infusion (premix)     heparin (porcine) subcutaneous injection 5,000 Units     multi-electrolyte (PLASMALYTE-A/ISOLYTE-S PH 7 4) IV solution        Amira Araya PA-C    Portions of the record may have been created with voice recognition software   Occasional wrong word or "sound a like" substitutions may have occurred due to the inherent limitations of voice recognition software  Read the chart carefully and recognize, using context, where substitutions have occurred

## 2022-04-04 NOTE — ASSESSMENT & PLAN NOTE
· With acute blood loss anemia suspected to be due to GI loss  The patient previously denies any bright red blood per rectum or dark stools on admission however now stating that she has been having "dark stools" over the past 1-2 weeks  The patient reports that she had blood transfusions for 6 months in 2000 (gastric bypass 1998) due to anemia  EGD and colonoscopy was done over 20 years ago  · Hemoglobin 8 6, unclear baseline  Was 11 6 in 2020    · Folate 2 1- started a supplement; vitamin B12- normal  · Iron panel results appreciated  · GI input appreciated  · Started on PPI IV b i d   · Occult blood +  · Planned for EGD and colonoscopy by GI today  · Monitor H&H closely and transfuse to keep hemoglobin greater than 7  · 4/1- 2 units of packed red blood cells and Benadryl due to itching and hives  · Monitor CBC closely

## 2022-04-04 NOTE — PROGRESS NOTES
[address]  Progress Note - Gina Talavera 1960, 63 y o  female MRN: [id number]  Unit/Bed#: -01 Encounter: [id number]  Primary Care Provider: LETTY Beckman   Date and time admitted to hospital: 3/28/2022  2:52 AM    * Acute kidney injury [facility])  Assessment & Plan  · Presents with generalized weakness and abdominal pain  · Creatinine 1 73 on admission, recent baseline appears to be around 1 1 mg/dL  Suspect prerenal due to GI losses and possible ATN   · With non-anion gap metabolic acidosis-- improving  Continue with sodium bicarbonate 650 b i d   · Received 1 L normal saline in the ER; received IVF   · Nephrology input appreciated     · 4/4- EGD and colonoscopy; IV fluids started  · Will monitor renal function closely   · Avoid nephrotoxins and hypotension       Mucositis  Assessment & Plan  · POA with complains of hands and feet tenderness/cracking, erythema and blistering  Patient reports that this has been going on for the past few days even before she came into the hospital   states that patient has been having "joint pain", shakiness of the hands for almost a month  · Unclear etiology   · 4/1-- complaining about "skin color" changes which has been going on since January especially on her feet/legs   · Will obtain arterial duplex and venous duplex - pending   · Working up for possible autoimmune issues  · ANCA- pending, SUKHDEEP- negative, CRP 10 9, ESR 14  · Supportive care, wound care input appreciated, p r n  lidocaine swish and swallow  · Outpatient referral rheumatology outpatient         Bradycardia  Assessment & Plan  · With h/o tachycardia   · Hemodynamic stable  · Decreased Toprol to 50 mg daily instead of 100 daily   · Much improved         Macrocytic anemia  Assessment & Plan  · With acute blood loss anemia suspected to be due to GI loss    The patient previously denies any bright red blood per rectum or dark stools on admission however now stating that she has been having "dark stools" over the past 1-2 weeks  The patient reports that she had blood transfusions for 6 months in 2000 (gastric bypass 1998) due to anemia  EGD and colonoscopy was done over 20 years ago  · Hemoglobin 8 6, unclear baseline  Was 11 6 in 2020  · Folate 2 1- started a supplement; vitamin B12- normal  · Iron panel results appreciated  · GI input appreciated  · Started on PPI IV b i d   · Occult blood +  · Planned for EGD and colonoscopy by GI today  · Monitor H&H closely and transfuse to keep hemoglobin greater than 7  · 4/1- 2 units of packed red blood cells and Benadryl due to itching and hives  · Monitor CBC closely      Acute cystitis without hematuria  Assessment & Plan  · Urinalysis positive for leukocyte esterase, leukocytes, and bacteria  · Treated with ceftriaxone in ER-- completed 3 day course  · Urine culture- E coli          Gastroenteritis  Assessment & Plan  · Presents with generalized weakness and left upper quadrant abdominal pain in the setting of recent viral illness, history of gastric bypass in 1998  · She has not been able to keep down any oral intake for 5-6 days  · COVID negative  · Mild transaminitis-- improved   · CT abdomen pelvis: Findings as above suspicious for gastritis/duodenitis, i e  peptic ulcer disease, in the proper clinical setting  Hepatic steatosis re-demonstrated  · GI input appreciated- suspected infectious in etiology   · Stool cultures- negative; C difficile-negative   · 4/4 diarrhea much improved and nearly resolved     Hypomagnesemia  Assessment & Plan  · Continue to replete and monitor          Hypokalemia  Assessment & Plan  · Continue to replete and monitor          Hyponatremia  Assessment & Plan  · Sodium 120 --> 133-135  · Suspected hypovolemia hyponatremia with concurrent use of thiazide diuresis with GI losses    · Nephrology input appreciated-continue with IV fluid for volume expansion  · TSH 5 879        · Monitor sodium level closely     Essential hypertension  Assessment & Plan  · BP reviewed  · Continue metoprolol decreased to 50 mg daily instead 100 mg daily   · Hold ACE-inhibitor for now due to BALJINDER   · Monitor blood pressure             VTE Prophylaxis:  Heparin    Patient Centered Rounds: I have performed bedside rounds with nursing staff today  Discussions with Specialists or Other Care Team Provider: GI   Education and Discussions with Family / Patient: patient and  at bedside     Current Length of Stay: 7 day(s)    Current Patient Status: Inpatient   Certification Statement: The patient will continue to require additional inpatient hospital stay due to gastroenteritis     Discharge Plan: pending hospital course     Code Status: Level 1 - Full Code    Subjective:   Feeling better  For EGD and colonoscopy today  Objective:     Vitals:   Temp (24hrs), Av °F (36 7 °C), Min:97 6 °F (36 4 °C), Max:98 4 °F (36 9 °C)    Temp:  [97 6 °F (36 4 °C)-98 4 °F (36 9 °C)] 98 1 °F (36 7 °C)  HR:  [56-82] 66  Resp:  [14-20] 19  BP: (123-163)/(60-91) 137/60  SpO2:  [90 %-100 %] 99 %  Body mass index is 31 64 kg/m²  Input and Output Summary (last 24 hours): Intake/Output Summary (Last 24 hours) at 2022 1424  Last data filed at 2022 1345  Gross per 24 hour   Intake 763 75 ml   Output 400 ml   Net 363 75 ml       Physical Exam:   Physical Exam  Vitals and nursing note reviewed  Constitutional:       General: She is not in acute distress  Appearance: Normal appearance  HENT:      Head: Normocephalic and atraumatic  Right Ear: External ear normal       Left Ear: External ear normal       Nose: Nose normal  No rhinorrhea  Mouth/Throat:      Mouth: Mucous membranes are moist       Pharynx: Oropharynx is clear  Eyes:      General:         Right eye: No discharge  Left eye: No discharge  Pupils: Pupils are equal, round, and reactive to light     Cardiovascular:      Rate and Rhythm: Normal rate and regular rhythm  Pulses: Normal pulses  Heart sounds: Normal heart sounds  No murmur heard  Pulmonary:      Effort: Pulmonary effort is normal  No respiratory distress  Breath sounds: Normal breath sounds  Abdominal:      General: Bowel sounds are normal  There is no distension  Palpations: Abdomen is soft  There is no mass  Tenderness: There is no abdominal tenderness  Musculoskeletal:         General: Swelling (hands and feet-- much improving) present  No tenderness  Normal range of motion  Cervical back: Normal range of motion and neck supple  No muscular tenderness  Skin:     General: Skin is warm and dry  Capillary Refill: Capillary refill takes less than 2 seconds  Coloration: Skin is pale  Findings: No erythema or rash  Neurological:      General: No focal deficit present  Mental Status: She is alert and oriented to person, place, and time  Mental status is at baseline  Psychiatric:         Mood and Affect: Mood normal          Behavior: Behavior normal          Thought Content: Thought content normal          Additional Data:     Labs:    Results from last 7 days   Lab Units 04/04/22  0625 04/01/22  0536 03/31/22  1308   WBC Thousand/uL 5 58   < > 6 12   HEMOGLOBIN g/dL 8 9*   < > 7 1*   HEMATOCRIT % 27 5*   < > 21 9*   PLATELETS Thousands/uL 211   < > 229   NEUTROS PCT %  --   --  48   LYMPHS PCT %  --   --  33   MONOS PCT %  --   --  15*   EOS PCT %  --   --  4    < > = values in this interval not displayed  Results from last 7 days   Lab Units 04/04/22  0625 04/02/22  0504 04/01/22  0535   SODIUM mmol/L 137   < > 135   POTASSIUM mmol/L 3 4*   < > 4 2   CHLORIDE mmol/L 108   < > 104   CO2 mmol/L 20*   < > 23   BUN mg/dL 6   < > 10   CREATININE mg/dL 1 17   < > 1 40*   CALCIUM mg/dL 8 2*   < > 8 2*   ALK PHOS U/L  --   --  91   ALT U/L  --   --  16   AST U/L  --   --  22    < > = values in this interval not displayed  * I Have Reviewed All Lab Data Listed Above  * Additional Pertinent Lab Tests Reviewed: Param 66 Admission  Reviewed    Imaging:  Imaging Reports Reviewed Today Include: n/a     Recent Cultures (last 7 days):     Results from last 7 days   Lab Units 03/29/22  0609   C DIFF TOXIN B BY PCR  Negative       Last 24 Hours Medication List:   Current Facility-Administered Medications   Medication Dose Route Frequency Provider Last Rate    acetaminophen  650 mg Oral Q6H PRN Brinda Rendon PA-C      busPIRone  10 mg Oral BID LETTY Cheung      diphenhydrAMINE  25 mg Oral Q6H PRN Brinda Rendon PA-C      fluticasone  2 spray Each Nare Daily LETTY Cheung      folic acid  1 mg Oral Daily LETTY Cheung      guaiFENesin  200 mg Oral Q4H PRN Suad Alatorre PA-C      Lidocaine Viscous HCl  15 mL Swish & Spit 4x Daily PRN LETTY Cheung      loperamide  2 mg Oral TID PRN LETTY Cheung      metoprolol succinate  50 mg Oral Daily LETTY Cheung      ondansetron  4 mg Intravenous Q6H PRN Brinda Rendon PA-C      ondansetron  4 mg Intravenous Once PRN Tye Bledsoe MD      pantoprazole  40 mg Intravenous Q12H [facility] LETTY Cheung      saccharomyces boulardii  250 mg Oral BID LETTY Cheung      sodium bicarbonate  650 mg Oral BID after meals Annabelle Randall MD      sodium chloride  75 mL/hr Intravenous Continuous Annabelle Randall MD 75 mL/hr (04/04/22 1320)        Today, Patient Was Seen By: LETTY Cheung    ** Please Note: Dictation voice to text software may have been used in the creation of this document   **

## 2022-04-04 NOTE — NURSING NOTE
Pt offered food again and agrees to a ham sandwich with suzie pt presently in bed in no acute distress eating,  at the pts bedside

## 2022-04-04 NOTE — NURSING NOTE
Pt upset about food brought up from the kitchen and states that she is not eating another thing brought from this kitchen, support offered, offered to get pt food from the cafe and she refused, pt again co "electricity shooting down her arm while bending over", vitals taken and charted, denies any cp and or sob,pt refusing to wear ty monitor, manuela np made aware and was in to see and eval the pt, tylenol given per pt request, xrays ordered

## 2022-04-04 NOTE — PLAN OF CARE
Problem: PAIN - ADULT  Goal: Verbalizes/displays adequate comfort level or baseline comfort level  Description: Interventions:  - Encourage patient to monitor pain and request assistance  - Assess pain using appropriate pain scale  - Administer analgesics based on type and severity of pain and evaluate response  - Implement non-pharmacological measures as appropriate and evaluate response  - Consider cultural and social influences on pain and pain management  - Notify physician/advanced practitioner if interventions unsuccessful or patient reports new pain  Outcome: Progressing     Problem: INFECTION - ADULT  Goal: Absence or prevention of progression during hospitalization  Description: INTERVENTIONS:  - Assess and monitor for signs and symptoms of infection  - Monitor lab/diagnostic results  - Monitor all insertion sites, i e  indwelling lines, tubes, and drains  - Monitor endotracheal if appropriate and nasal secretions for changes in amount and color  - [place] appropriate cooling/warming therapies per order  - Administer medications as ordered  - Instruct and encourage patient and family to use good hand hygiene technique  - Identify and instruct in appropriate isolation precautions for identified infection/condition  Outcome: Progressing  Goal: Absence of fever/infection during neutropenic period  Description: INTERVENTIONS:  - Monitor WBC    Outcome: Progressing     Problem: DISCHARGE PLANNING  Goal: Discharge to home or other facility with appropriate resources  Description: INTERVENTIONS:  - Identify barriers to discharge w/patient and caregiver  - Arrange for needed discharge resources and transportation as appropriate  - Identify discharge learning needs (meds, wound care, etc )  - Arrange for interpretive services to assist at discharge as needed  - Refer to Case Management Department for coordinating discharge planning if the patient needs post-hospital services based on physician/advanced practitioner order or complex needs related to functional status, cognitive ability, or social support system  Outcome: Progressing     Problem: Knowledge Deficit  Goal: Patient/family/caregiver demonstrates understanding of disease process, treatment plan, medications, and discharge instructions  Description: Complete learning assessment and assess knowledge base    Interventions:  - Provide teaching at level of understanding  - Provide teaching via preferred learning methods  Outcome: Progressing     Problem: Potential for Falls  Goal: Patient will remain free of falls  Description: INTERVENTIONS:  - Educate patient/family on patient safety including physical limitations  - Instruct patient to call for assistance with activity   - Consult OT/PT to assist with strengthening/mobility   - Keep Call bell within reach  - Keep bed low and locked with side rails adjusted as appropriate  - Keep care items and personal belongings within reach  - Initiate and maintain comfort rounds  - Make Fall Risk Sign visible to staff  - Offer Toileting every 2 Hours, in advance of need  - Initiate/Maintain bed alarm  - Obtain necessary fall risk management equipment: bed   - Apply yellow socks and bracelet for high fall risk patients  - Consider moving patient to room near nurses station  Outcome: Progressing     Problem: HEMATOLOGIC - ADULT  Goal: Maintains hematologic stability  Description: INTERVENTIONS  - Assess for signs and symptoms of bleeding or hemorrhage  - Monitor labs  - Administer supportive blood products/factors as ordered and appropriate  Outcome: Progressing

## 2022-04-05 ENCOUNTER — TRANSITIONAL CARE MANAGEMENT (OUTPATIENT)
Dept: FAMILY MEDICINE CLINIC | Facility: CLINIC | Age: 62
End: 2022-04-05

## 2022-04-05 VITALS
HEIGHT: 60 IN | TEMPERATURE: 98.3 F | DIASTOLIC BLOOD PRESSURE: 72 MMHG | RESPIRATION RATE: 18 BRPM | WEIGHT: 162 LBS | HEART RATE: 86 BPM | SYSTOLIC BLOOD PRESSURE: 139 MMHG | BODY MASS INDEX: 31.8 KG/M2 | OXYGEN SATURATION: 99 %

## 2022-04-05 PROBLEM — E87.1 HYPONATREMIA: Status: RESOLVED | Noted: 2022-03-28 | Resolved: 2022-04-05

## 2022-04-05 PROBLEM — E83.42 HYPOMAGNESEMIA: Status: RESOLVED | Noted: 2022-03-28 | Resolved: 2022-04-05

## 2022-04-05 PROBLEM — E87.6 HYPOKALEMIA: Status: RESOLVED | Noted: 2022-03-28 | Resolved: 2022-04-05

## 2022-04-05 PROBLEM — M79.602 LEFT ARM PAIN: Status: ACTIVE | Noted: 2022-04-05

## 2022-04-05 LAB
ANION GAP SERPL CALCULATED.3IONS-SCNC: 12 MMOL/L (ref 4–13)
BUN SERPL-MCNC: 5 MG/DL (ref 5–25)
CALCIUM SERPL-MCNC: 8.7 MG/DL (ref 8.4–10.2)
CHLORIDE SERPL-SCNC: 110 MMOL/L (ref 96–108)
CO2 SERPL-SCNC: 19 MMOL/L (ref 21–32)
CREAT SERPL-MCNC: 1.14 MG/DL (ref 0.6–1.3)
GFR SERPL CREATININE-BSD FRML MDRD: 52 ML/MIN/1.73SQ M
GLUCOSE SERPL-MCNC: 60 MG/DL (ref 65–140)
HCT VFR BLD AUTO: 29.9 % (ref 34.8–46.1)
HGB BLD-MCNC: 9.6 G/DL (ref 11.5–15.4)
MCH RBC QN AUTO: 35 PG (ref 26.8–34.3)
MCHC RBC AUTO-ENTMCNC: 32.1 G/DL (ref 31.4–37.4)
MCV RBC AUTO: 109 FL (ref 82–98)
PLATELET # BLD AUTO: 268 THOUSANDS/UL (ref 149–390)
PMV BLD AUTO: 8.8 FL (ref 8.9–12.7)
POTASSIUM SERPL-SCNC: 3.8 MMOL/L (ref 3.5–5.3)
RBC # BLD AUTO: 2.74 MILLION/UL (ref 3.81–5.12)
SODIUM SERPL-SCNC: 141 MMOL/L (ref 135–147)
WBC # BLD AUTO: 5.54 THOUSAND/UL (ref 4.31–10.16)

## 2022-04-05 PROCEDURE — 99239 HOSP IP/OBS DSCHRG MGMT >30: CPT | Performed by: NURSE PRACTITIONER

## 2022-04-05 PROCEDURE — 99232 SBSQ HOSP IP/OBS MODERATE 35: CPT | Performed by: PHYSICIAN ASSISTANT

## 2022-04-05 PROCEDURE — 80048 BASIC METABOLIC PNL TOTAL CA: CPT | Performed by: NURSE PRACTITIONER

## 2022-04-05 PROCEDURE — C9113 INJ PANTOPRAZOLE SODIUM, VIA: HCPCS | Performed by: NURSE PRACTITIONER

## 2022-04-05 PROCEDURE — 85027 COMPLETE CBC AUTOMATED: CPT | Performed by: NURSE PRACTITIONER

## 2022-04-05 RX ORDER — FLUTICASONE PROPIONATE 50 MCG
2 SPRAY, SUSPENSION (ML) NASAL DAILY
Qty: 16 G | Refills: 0 | Status: SHIPPED | OUTPATIENT
Start: 2022-04-06

## 2022-04-05 RX ORDER — PANTOPRAZOLE SODIUM 40 MG/1
40 TABLET, DELAYED RELEASE ORAL DAILY
Qty: 30 TABLET | Refills: 0 | Status: SHIPPED | OUTPATIENT
Start: 2022-04-05 | End: 2022-06-06

## 2022-04-05 RX ORDER — FOLIC ACID 1 MG/1
1 TABLET ORAL DAILY
Qty: 30 TABLET | Refills: 0 | Status: SHIPPED | OUTPATIENT
Start: 2022-04-06 | End: 2022-05-06

## 2022-04-05 RX ORDER — METOPROLOL SUCCINATE 50 MG/1
50 TABLET, EXTENDED RELEASE ORAL DAILY
Qty: 30 TABLET | Refills: 0 | Status: SHIPPED | OUTPATIENT
Start: 2022-04-06 | End: 2022-04-08 | Stop reason: SDUPTHER

## 2022-04-05 RX ORDER — LIDOCAINE HYDROCHLORIDE 20 MG/ML
15 SOLUTION OROPHARYNGEAL 4 TIMES DAILY PRN
Qty: 100 ML | Refills: 0 | Status: SHIPPED | OUTPATIENT
Start: 2022-04-05 | End: 2022-04-12 | Stop reason: ALTCHOICE

## 2022-04-05 RX ORDER — SACCHAROMYCES BOULARDII 250 MG
250 CAPSULE ORAL 2 TIMES DAILY
Qty: 60 CAPSULE | Refills: 0 | Status: SHIPPED | OUTPATIENT
Start: 2022-04-05 | End: 2022-05-05

## 2022-04-05 RX ORDER — SODIUM BICARBONATE 650 MG/1
650 TABLET ORAL
Qty: 60 TABLET | Refills: 0 | Status: SHIPPED | OUTPATIENT
Start: 2022-04-05 | End: 2022-05-05

## 2022-04-05 RX ADMIN — BUSPIRONE HYDROCHLORIDE 10 MG: 5 TABLET ORAL at 08:18

## 2022-04-05 RX ADMIN — METOPROLOL SUCCINATE 50 MG: 50 TABLET, EXTENDED RELEASE ORAL at 08:16

## 2022-04-05 RX ADMIN — PANTOPRAZOLE SODIUM 40 MG: 40 INJECTION, POWDER, FOR SOLUTION INTRAVENOUS at 08:16

## 2022-04-05 RX ADMIN — Medication 250 MG: at 08:18

## 2022-04-05 RX ADMIN — SODIUM BICARBONATE 650 MG: 650 TABLET ORAL at 08:18

## 2022-04-05 RX ADMIN — FOLIC ACID 1 MG: 1 TABLET ORAL at 08:18

## 2022-04-05 RX ADMIN — FLUTICASONE PROPIONATE 2 SPRAY: 50 SPRAY, METERED NASAL at 08:19

## 2022-04-05 NOTE — ASSESSMENT & PLAN NOTE
· Sodium 120 --> 133-135  · Suspected hypovolemia hyponatremia with concurrent use of thiazide diuresis with GI losses    · Nephrology input appreciated  · TSH 5 879        · Resolved

## 2022-04-05 NOTE — ASSESSMENT & PLAN NOTE
· BP reviewed   · Continue metoprolol decreased to 50 mg daily instead 100 mg daily   · Held ACE-inhibitor for now due to BALJINDER  · Discussed with nephrology at this time recommend to continue holding Diovan on discharge    If indicated medication can be added as an outpatient if no worsening renal function   · Monitor blood pressure

## 2022-04-05 NOTE — NURSING NOTE
Patient called to question the stoppage of her Diovan, spoke with NP and instructed the patient as to why the medication was stopped  Verbalized understanding

## 2022-04-05 NOTE — ASSESSMENT & PLAN NOTE
· Presents with generalized weakness and left upper quadrant abdominal pain in the setting of recent viral illness, history of gastric bypass in 1998  · She was not able to keep down any oral intake for 5-6 days prior to admission   · COVID negative  · Mild transaminitis-- improved   · CT abdomen pelvis: Findings as above suspicious for gastritis/duodenitis, i e  peptic ulcer disease, in the proper clinical setting  Hepatic steatosis re-demonstrated     · GI input appreciated- suspected infectious in etiology   · Stool cultures- negative; C difficile-negative   · Diarrhea resolved on discharge

## 2022-04-05 NOTE — DISCHARGE SUMMARY
[address]  Discharge- Katie Bull 1960, 63 y o  female MRN: [id number]  Unit/Bed#: -01 Encounter: [id number]  Primary Care Provider: LETTY Vaz   Date and time admitted to hospital: 3/28/2022  2:52 AM    * Acute kidney injury [facility])  Assessment & Plan  · Presents with generalized weakness and abdominal pain  · Creatinine 1 73 on admission, recent baseline appears to be around 1 1 mg/dL  Suspect prerenal due to GI losses and possible ATN- BALJINDER resolved  · With non-anion gap metabolic acidosis-- improving  Continue with sodium bicarbonate 650 b i d  on discharge  · F/u BMP in 1 week and patient nephrology follow-up  · Received 1 L normal saline in the ER; received IVF   · Nephrology input appreciated     · Will monitor renal function closely as outpatient       Left arm pain  Assessment & Plan  · Patient is complaining of sharp shooting pain on the left arm while bending  · X-ray of the left shoulder shows calcific tendinitis  · Recommend warm compress, Tylenol for pain  · Outpatient Orthopedics referral    Mucositis  Assessment & Plan  · POA with complains of hands and feet tenderness/cracking, erythema and blistering  Patient reports that this has been going on for the past few days even before she came into the hospital   states that patient has been having "joint pain", shakiness of the hands for almost a month      · Unclear etiology   · 4/1-- complaining about "skin color" changes which has been going on since January especially on her feet/legs   · Will obtain arterial duplex and venous duplex - pending   · Working up for possible autoimmune issues  · ANCA- pending, SUKHDEEP- negative, CRP 10 9, ESR 14  · Supportive care, wound care input appreciated, p r n  lidocaine swish and swallow  · Outpatient referral rheumatology outpatient          Bradycardia  Assessment & Plan  · With h/o tachycardia   · Hemodynamic stable  · Decreased Toprol to 50 mg daily instead of 100 daily    · Much improved         Macrocytic anemia  Assessment & Plan  · With acute blood loss anemia suspected to be due to GI loss  The patient previously denies any bright red blood per rectum or dark stools on admission however now stating that she has been having "dark stools" over the past 1-2 weeks  The patient reports that she had blood transfusions for 6 months in 2000 (gastric bypass 1998) due to anemia  EGD and colonoscopy was done over 20 years ago  · Hemoglobin 8 6, unclear baseline  Was 11 6 in 2020  · Folate 2 1- started a supplement; vitamin B12- normal  · Iron panel results appreciated  · GI input appreciated  · Started on PPI IV b i d --> PO on discharge   · Occult blood +  · 4/4 EGD- gastro jejunal anastomosis appears normal   Gastric pouch appeared normal   No acute finding or active bleeding identified; and colonoscopy-no acute findings except small internal hemorrhoids  Recommend to repeat colonoscopy in 10 years  · Hemoglobin stable  · Follow-up with outpatient GI  · 4/1- 2 units of packed red blood cells and Benadryl due to itching and hives    Acute cystitis without hematuria  Assessment & Plan  · Urinalysis positive for leukocyte esterase, leukocytes, and bacteria  · Treated with ceftriaxone in ER-- completed 3 day course  · Urine culture- E coli           Gastroenteritis  Assessment & Plan  · Presents with generalized weakness and left upper quadrant abdominal pain in the setting of recent viral illness, history of gastric bypass in 1998  · She was not able to keep down any oral intake for 5-6 days prior to admission   · COVID negative  · Mild transaminitis-- improved   · CT abdomen pelvis: Findings as above suspicious for gastritis/duodenitis, i e  peptic ulcer disease, in the proper clinical setting  Hepatic steatosis re-demonstrated     · GI input appreciated- suspected infectious in etiology   · Stool cultures- negative; C difficile-negative   · Diarrhea resolved on discharge     Essential hypertension  Assessment & Plan  · BP reviewed   · Continue metoprolol decreased to 50 mg daily instead 100 mg daily   · Held ACE-inhibitor for now due to BALJINDER  · Discussed with nephrology at this time recommend to continue holding Diovan on discharge  If indicated medication can be added as an outpatient if no worsening renal function   · Monitor blood pressure          Hypomagnesemia-resolved as of 4/5/2022  Assessment & Plan  · repleted     Hypokalemia-resolved as of 4/5/2022  Assessment & Plan  · repleted      Hyponatremia-resolved as of 4/5/2022  Assessment & Plan  · Sodium 120 --> 133-135  · Suspected hypovolemia hyponatremia with concurrent use of thiazide diuresis with GI losses  · Nephrology input appreciated  · TSH 5 879        · Resolved           Discharging Physician / Practitioner: Ajit Stone  PCP: Ajit Mcdonnell  Admission Date:   Admission Orders (From admission, onward)     Ordered        03/28/22 0551  Inpatient Admission  Once                      Discharge Date: 04/05/22    Medical Problems             Resolved Problems  Date Reviewed: 4/5/2022          Resolved    Hyponatremia 4/5/2022     Resolved by  LETTY Stone    Hypokalemia 4/5/2022     Resolved by  Pa Willard, CRNP    Hypomagnesemia 4/5/2022     Resolved by  Pa Willard, [address] [facility] Stay:  · GI  · Nephrology    Procedures Performed:   · EGD and colonoscopy- essentially unremarkable    Significant Findings / Test Results:   · Left shoulder x-ray Small calcific density in the subacromial space, likely calcific tendinitis  · Left elbow x-ray No acute osseous abnormality  · Chest x-ray No acute cardiopulmonary disease     · CT abdomen pelvis Limited secondary to lack of enteric and IV contrast   Findings as above suspicious for gastritis/duodenitis, i e  peptic ulcer disease, in the proper clinical setting   Correlate clinically and consider follow-up GI consultation   No free air  Redemonstrated diffuse hepatic steatosis  Incidental Findings:   · None     Test Results Pending at Discharge (will require follow up): · None     Outpatient Tests Requested:  · Follow-up PCP, GI, Nephrology, Rheumatology, and orthopedics    Complications:     None    Reason for Admission:  Abdominal pain and generalized weakness    Hospital Course:     Chester Correa is a 63 y o  female patient who originally presented to the hospital on 3/28/2022 due to abdominal pain and generalized weakness  The patient found to have gastroenteritis with diarrhea  GI evaluation done  Suspected this is related to viral gastroenteritis  Diarrhea much improved  The patient was noted to have acute on chronic anemia and underwent EGD and colonoscopy  Unremarkable findings without any evidence of active bleeding  At this time GI recommend to follow-up as an outpatient and likely will require capsule study to further evaluate if her hemoglobin continues to drop  At this time the patient received 2 units of packed red blood cells while in the hospital   Hemoglobin stabilized  Nephrology evaluation done for acute kidney injury and hyponatremia  The patient has mild elevated CRP with mucositis and erythema on her hands and feet concerning for possible autoimmune disease  Outpatient Rheumatology referral placed  Additionally the patient complains of some left arm pain worsening when she bends down  X-ray result noted above  Outpatient orthopedics place  On discharge the patient will continue holding Diovan until further evaluated by nephrology outpatient  She will continue with sodium bicarbonate b i d  Please see above list of diagnoses and related plan for additional information  Condition at Discharge: good     Discharge Day Visit / Exam:     Subjective:  Feeling much better  Denies any nausea, vomiting, or diarrhea    Would like to go home   Vitals: Blood Pressure: 139/72 (04/05/22 1003)  Pulse: 86 (04/05/22 1003)  Temperature: 98 3 °F (36 8 °C) (04/05/22 0749)  Temp Source: Temporal (04/04/22 1900)  Respirations: 18 (04/05/22 0749)  Height: 5' (152 4 cm) (03/31/22 1400)  Weight - Scale: 73 5 kg (162 lb) (03/31/22 1400)  SpO2: 99 % (04/05/22 1003)  Exam:   Physical Exam  Vitals and nursing note reviewed  Constitutional:       General: She is not in acute distress  Appearance: Normal appearance  HENT:      Head: Normocephalic and atraumatic  Right Ear: External ear normal       Left Ear: External ear normal       Nose: Nose normal  No rhinorrhea  Mouth/Throat:      Mouth: Mucous membranes are moist       Pharynx: Oropharynx is clear  Eyes:      General:         Right eye: No discharge  Left eye: No discharge  Pupils: Pupils are equal, round, and reactive to light  Cardiovascular:      Rate and Rhythm: Normal rate and regular rhythm  Pulses: Normal pulses  Heart sounds: Normal heart sounds  No murmur heard  Pulmonary:      Effort: Pulmonary effort is normal  No respiratory distress  Breath sounds: Normal breath sounds  Abdominal:      General: Bowel sounds are normal  There is no distension  Palpations: Abdomen is soft  There is no mass  Tenderness: There is no abdominal tenderness  Musculoskeletal:         General: No swelling or tenderness  Normal range of motion  Cervical back: Normal range of motion and neck supple  No muscular tenderness  Skin:     General: Skin is warm and dry  Capillary Refill: Capillary refill takes less than 2 seconds  Findings: No erythema or rash  Neurological:      General: No focal deficit present  Mental Status: She is alert and oriented to person, place, and time  Mental status is at baseline  Psychiatric:         Mood and Affect: Mood normal          Behavior: Behavior normal          Thought Content:  Thought content normal          Judgment: Judgment normal          Discussion with Family:   at bedside    Discharge instructions/Information to patient and family:   See after visit summary for information provided to patient and family  Provisions for Follow-Up Care:  See after visit summary for information related to follow-up care and any pertinent home health orders  Disposition:     Home      Planned Readmission:    No      Discharge Statement:  I spent 38 minutes discharging the patient  This time was spent on the day of discharge  I had direct contact with the patient on the day of discharge  Greater than 50% of the total time was spent examining patient, answering all patient questions, arranging and discussing plan of care with patient as well as directly providing post-discharge instructions  Additional time then spent on discharge activities  Discharge Medications:  See after visit summary for reconciled discharge medications provided to patient and family        ** Please Note: This note has been constructed using a voice recognition system **

## 2022-04-05 NOTE — ASSESSMENT & PLAN NOTE
· With acute blood loss anemia suspected to be due to GI loss  The patient previously denies any bright red blood per rectum or dark stools on admission however now stating that she has been having "dark stools" over the past 1-2 weeks  The patient reports that she had blood transfusions for 6 months in 2000 (gastric bypass 1998) due to anemia  EGD and colonoscopy was done over 20 years ago  · Hemoglobin 8 6, unclear baseline  Was 11 6 in 2020  · Folate 2 1- started a supplement; vitamin B12- normal  · Iron panel results appreciated  · GI input appreciated  · Started on PPI IV b i d --> PO on discharge   · Occult blood +  · 4/4 EGD- gastro jejunal anastomosis appears normal   Gastric pouch appeared normal   No acute finding or active bleeding identified; and colonoscopy-no acute findings except small internal hemorrhoids  Recommend to repeat colonoscopy in 10 years     · Hemoglobin stable  · Follow-up with outpatient GI  · 4/1- 2 units of packed red blood cells and Benadryl due to itching and hives

## 2022-04-05 NOTE — ASSESSMENT & PLAN NOTE
· Presents with generalized weakness and abdominal pain  · Creatinine 1 73 on admission, recent baseline appears to be around 1 1 mg/dL  Suspect prerenal due to GI losses and possible ATN- BALJINDER resolved  · With non-anion gap metabolic acidosis-- improving    Continue with sodium bicarbonate 650 b i d  on discharge  · F/u BMP in 1 week and patient nephrology follow-up  · Received 1 L normal saline in the ER; received IVF   · Nephrology input appreciated     · Will monitor renal function closely as outpatient

## 2022-04-05 NOTE — PLAN OF CARE
Problem: PAIN - ADULT  Goal: Verbalizes/displays adequate comfort level or baseline comfort level  Description: Interventions:  - Encourage patient to monitor pain and request assistance  - Assess pain using appropriate pain scale  - Administer analgesics based on type and severity of pain and evaluate response  - Implement non-pharmacological measures as appropriate and evaluate response  - Consider cultural and social influences on pain and pain management  - Notify physician/advanced practitioner if interventions unsuccessful or patient reports new pain  Outcome: Progressing     Problem: INFECTION - ADULT  Goal: Absence or prevention of progression during hospitalization  Description: INTERVENTIONS:  - Assess and monitor for signs and symptoms of infection  - Monitor lab/diagnostic results  - Monitor all insertion sites, i e  indwelling lines, tubes, and drains  - Monitor endotracheal if appropriate and nasal secretions for changes in amount and color  - Lindsay appropriate cooling/warming therapies per order  - Administer medications as ordered  - Instruct and encourage patient and family to use good hand hygiene technique  - Identify and instruct in appropriate isolation precautions for identified infection/condition  Outcome: Progressing  Goal: Absence of fever/infection during neutropenic period  Description: INTERVENTIONS:  - Monitor WBC    Outcome: Progressing     Problem: DISCHARGE PLANNING  Goal: Discharge to home or other facility with appropriate resources  Description: INTERVENTIONS:  - Identify barriers to discharge w/patient and caregiver  - Arrange for needed discharge resources and transportation as appropriate  - Identify discharge learning needs (meds, wound care, etc )  - Arrange for interpretive services to assist at discharge as needed  - Refer to Case Management Department for coordinating discharge planning if the patient needs post-hospital services based on physician/advanced practitioner order or complex needs related to functional status, cognitive ability, or social support system  Outcome: Progressing     Problem: Knowledge Deficit  Goal: Patient/family/caregiver demonstrates understanding of disease process, treatment plan, medications, and discharge instructions  Description: Complete learning assessment and assess knowledge base    Interventions:  - Provide teaching at level of understanding  - Provide teaching via preferred learning methods  Outcome: Progressing     Problem: Potential for Falls  Goal: Patient will remain free of falls  Description: INTERVENTIONS:  - Educate patient/family on patient safety including physical limitations  - Instruct patient to call for assistance with activity   - Consult OT/PT to assist with strengthening/mobility   - Keep Call bell within reach  - Keep bed low and locked with side rails adjusted as appropriate  - Keep care items and personal belongings within reach  - Initiate and maintain comfort rounds  - Consider moving patient to room near nurses station  Outcome: Progressing     Problem: HEMATOLOGIC - ADULT  Goal: Maintains hematologic stability  Description: INTERVENTIONS  - Assess for signs and symptoms of bleeding or hemorrhage  - Monitor labs  - Administer supportive blood products/factors as ordered and appropriate  Outcome: Progressing

## 2022-04-05 NOTE — PROGRESS NOTES
Progress Note - Nephrology   Jodine Bumpers 63 y o  female MRN: [id number]  Unit/Bed#: -01 Encounter: [id number]    Assessment and Plan    1  Acute kidney injury, superimposed on chronic kidney disease, present admission  · Results to baseline  Hold RAAS inhibitor at discharge given recurrent acute kidney injuries  BMP within 1 week of discharge followed by outpatient nephrology follow-up  2  Chronic kidney disease, stage III baseline creatinine 1 2 mg/dL  3  Hypovolemic hyponatremia  · Occurred in the setting of diarrheal illness  Now at goal  Follow on bicarbonate tablets at this time  Re-evaluate ion outpatient setting  4  Non-anion gap metabolic acidosis  ·   Carbon dioxide 19 millimole per L on 04/05/2022  Patient recently received normal saline instead of balanced IV fluid  Will discharge on sodium bicarbonate supplementation  Consider discontinuation following results of outpatient BMP within 1 week  5  Anemia  ·   Normal colonoscopy, only internal hemorrhoids 04/04/2022  Follow up reason for today's visit:     Acute kidney injury [facility])    Patient Active Problem List   Diagnosis    Class 1 obesity due to excess calories without serious comorbidity with body mass index (BMI) of 33 0 to 33 9 in adult    Essential hypertension    Anxiety and depression    Pap smear for cervical cancer screening    Postmenopausal bleeding    Vaginal atrophy    At risk for loss of bone density    Dyspareunia, female    S/P gastric bypass    History of myocardial infarction    Arthritis of lumbar spine    Acute kidney injury ([address] )    Gastroenteritis    Painful tongue    Acute cystitis without hematuria    Macrocytic anemia    Bradycardia    Mucositis    Left arm pain         Subjective:    Denies physical complaints  Is feeling well and is ready for discharge  A complete 10 point review of systems was performed and is otherwise negative  Spoke with patient and spouse        Objective: Vitals: Blood pressure 139/72, pulse 86, temperature 98 3 °F (36 8 °C), resp  rate 18, height 5' (1 524 m), weight 73 5 kg (162 lb), SpO2 99 %  ,Body mass index is 31 64 kg/m²  Weight (last 2 days) before discharge     None            Intake/Output Summary (Last 24 hours) at 4/5/2022 1416  Last data filed at 4/4/2022 1911  Gross per 24 hour   Intake 460 ml   Output --   Net 460 ml     I/O last 3 completed shifts: In: 1223 8 [P O :360; I V :763 8; IV Piggyback:100]  Out: 400 [Stool:400]         Physical Exam: /72   Pulse 86   Temp 98 3 °F (36 8 °C)   Resp 18   Ht 5' (1 524 m)   Wt 73 5 kg (162 lb)   SpO2 99%   BMI 31 64 kg/m²     General Appearance:    No acute distress  Cooperative  Appears stated age  Obese  Head:    Normocephalic  Atraumatic  Normal jaw occlusion  Eyes:    Lids, conjunctiva normal  No scleral icterus  Ears:    Normal external ears  Nose:   Nares normal  No drainage  Mouth:   Lips, tongue normal  Mucosa normal  Phonation normal    Neck:   Supple  Symmetrical    Back:     Symmetric  No CVA tenderness  Lungs:     Normal respiratory effort  Clear to auscultation bilaterally  Chest wall:    No tenderness or deformity  Heart:    Regular rate and rhythm  Normal S1 and S2  No murmur  No JVD  No edema  Abdomen:     Soft  Non-tender  Bowel sounds active  Genitourinary:   No Holly catheter present  Extremities:   Extremities normal  Atraumatic  No cyanosis  Skin:   Warm and dry  No pallor, jaundice, rash, ecchymoses  Neurologic:   Alert and oriented to person, place, time  No focal deficit  Lab, Imaging and other studies: I have personally reviewed pertinent labs    CBC:   Lab Results   Component Value Date    WBC 5 54 04/05/2022    HGB 9 6 (L) 04/05/2022    HCT 29 9 (L) 04/05/2022     (H) 04/05/2022     04/05/2022    MCH 35 0 (H) 04/05/2022    MCHC 32 1 04/05/2022    MPV 8 8 (L) 04/05/2022     CMP:   Lab Results   Component Value Date K 3 8 04/05/2022     (H) 04/05/2022    CO2 19 (L) 04/05/2022    BUN 5 04/05/2022    CREATININE 1 14 04/05/2022    CALCIUM 8 7 04/05/2022    EGFR 52 04/05/2022         Results from last 7 days   Lab Units 04/05/22  0634 04/04/22  0625 04/03/22  0631 04/02/22  0504 04/01/22  0535   POTASSIUM mmol/L 3 8 3 4* 3 9   < > 4 2   CHLORIDE mmol/L 110* 108 102   < > 104   CO2 mmol/L 19* 20* 22   < > 23   BUN mg/dL 5 6 9   < > 10   CREATININE mg/dL 1 14 1 17 1 62*   < > 1 40*   CALCIUM mg/dL 8 7 8 2* 8 5   < > 8 2*   ALK PHOS U/L  --   --   --   --  91   ALT U/L  --   --   --   --  16   AST U/L  --   --   --   --  22    < > = values in this interval not displayed  Phosphorus: No results found for: PHOS  Magnesium: No results found for: MG  Urinalysis: No results found for: [place], SPECGRAV, PHUR, LEUKOCYTESUR, NITRITE, PROTEINUA, GLUCOSEU, KETONESU, BILIRUBINUR, BLOODU  Ionized Calcium: No results found for: CAION  Coagulation: No results found for: PT, INR, APTT  Troponin: No results found for: TROPONINI  ABG: No results found for: PHART, MOK0NFK, PO2ART, RJV7RZL, D8HLDCPD, BEART, SOURCE  Radiology review:     IMAGING  Procedure: EGD    Result Date: 4/4/2022  Narrative: [place] [address] [phone] DATE OF SERVICE: 4/04/22 PHYSICIAN(S): Attending: Jeff Garibay MD Fellow: No Staff Documented INDICATION: Melena, S/P gastric bypass, Macrocytic anemia POST-OP DIAGNOSIS: See the impression below  PREPROCEDURE: Informed consent was obtained for the procedure, including sedation  Risks of perforation, hemorrhage, adverse drug reaction and aspiration were discussed  The patient was placed in the left lateral decubitus position  Patient was explained about the risks and benefits of the procedure  Risks including but not limited to bleeding, infection, and perforation were explained in detail   Also explained about less than 100% sensitivity with the exam and other alternatives  DETAILS OF PROCEDURE: Patient was taken to the procedure room where a time out was performed to confirm correct patient and correct procedure  The patient underwent monitored anesthesia care, which was administered by an anesthesia professional  The patient's blood pressure, heart rate, level of consciousness, respirations and oxygen were monitored throughout the procedure  The scope was introduced through the mouth and advanced to the jejunum  The patient experienced no blood loss  The procedure was not difficult  The patient tolerated the procedure well  There were no apparent complications  ANESTHESIA INFORMATION: ASA: III Anesthesia Type: IV Sedation with Anesthesia MEDICATIONS: sodium chloride 0 9 % infusion 763 75 mL*  *From user-documented volume (Totals for administrations occurring from 1320 to 1346 on 04/04/22) FINDINGS: The jejunum appeared normal  The gastrojejunal anastomosis appeared normal  The gastric pouch appeared normal  The upper third of the esophagus, middle third of the esophagus and lower third of the esophagus appeared normal  Z-line is 35 cm from the incisors  SPECIMENS: * No specimens in log *     Impression: The jejunum appeared normal  The gastrojejunal anastomosis appeared normal  The gastric pouch appeared normal  The upper third of the esophagus, middle third of the esophagus and lower third of the esophagus appeared normal  Z-line is 35 cm from the incisors  RECOMMENDATION: There is no recommended follow-up for this procedure  Proceed to colonoscopy   Maddy Chandler MD     Procedure: Colonoscopy    Result Date: 4/4/2022  Narrative: Marisol Salazar [address], [place] [address] [phone] DATE OF SERVICE: 4/04/22 PHYSICIAN(S): Attending: Maddy Chandler MD Fellow: No Staff Documented INDICATION: Melena, Macrocytic anemia POST-OP DIAGNOSIS: See the impression below  HISTORY: Prior colonoscopy: More than 10 years ago  BOWEL PREPARATION:  PREPROCEDURE: Informed consent was obtained for the procedure, including sedation  Risks including but not limited to bleeding, infection, perforation, adverse drug reaction and aspiration were explained in detail  Also explained about less than 100% sensitivity with the exam and other alternatives  The patient was placed in the left lateral decubitus position  DETAILS OF PROCEDURE: Patient was taken to the procedure room where a time out was performed to confirm correct patient and correct procedure  The patient underwent monitored anesthesia care, which was administered by an anesthesia professional  The patient's blood pressure, heart rate, level of consciousness, oxygen, respirations and ETCO2 were monitored throughout the procedure  A digital rectal exam was performed  The scope was introduced through the anus and advanced to the cecum  Retroflexion was performed in the rectum  The quality of bowel preparation was evaluated using the [facility] Bowel Preparation Scale with scores of: right colon = 2, transverse colon = 2, left colon = 2  The total BBPS score was 6  Bowel prep was adequate  The patient experienced no blood loss  The procedure was not difficult  The patient tolerated the procedure well  There were no apparent complications  ANESTHESIA INFORMATION: ASA: III Anesthesia Type: IV Sedation with Anesthesia MEDICATIONS: sodium chloride 0 9 % infusion 763 75 mL*  *From user-documented volume (Totals for administrations occurring from 1320 to 1347 on 04/04/22) FINDINGS: All observed locations appeared normal, including the cecum, ascending colon, transverse colon, descending colon and rectosigmoid   Small, internal hemorrhoids EVENTS: Procedure Events Event Event Time ENDO SCOPE OUT TIME 4/4/2022  1:29 PM ENDO CECUM REACHED 4/4/2022  1:38 PM ENDO SCOPE OUT TIME 4/4/2022  1:44 PM SPECIMENS: * No specimens in log * EQUIPMENT: Colonoscope -     Impression: All observed locations appeared normal, including the cecum, ascending colon, transverse colon, descending colon and rectosigmoid  Small, internal hemorrhoids RECOMMENDATION: Repeat screening colonoscopy in 10 years   Nakia Larson MD     Procedure: XR shoulder 2+ vw left    Result Date: 4/5/2022  Narrative: LEFT SHOULDER INDICATION:   left arm pain  COMPARISON:  4/15/2019 VIEWS:  XR SHOULDER 2+ VW LEFT FINDINGS: There is no acute fracture or dislocation  No significant degenerative changes  No lytic or blastic osseous lesion  There is a small calcification in the subacromial space, not present previously  Impression: Small calcific density in the subacromial space, likely calcific tendinitis  Workstation performed: [id number]     Procedure: XR elbow 3+ vw left    Result Date: 4/5/2022  Narrative: LEFT ELBOW INDICATION:   left arm pain  COMPARISON:  None VIEWS:  XR ELBOW 3+ VW LEFT FINDINGS: There is no acute fracture or dislocation  There is no joint effusion  No significant degenerative changes  No lytic or blastic osseous lesion  Soft tissues are unremarkable  Impression: No acute osseous abnormality  Workstation performed: [id number]     Procedure: VAS lower limb venous duplex study, complete bilateral    Result Date: 4/4/2022  Narrative:  [facility] REPORT CLINICAL: Indications: Patient presents with bilateral lower extremity pain and swelling x several days  Operative History: No cardiovascular surgeries Risk Factors The patient has history of HTN  CONCLUSION:  Impression: RIGHT LOWER LIMB: No evidence of acute or chronic deep vein thrombosis  No evidence of superficial thrombophlebitis noted  Doppler evaluation shows a normal response to augmentation maneuvers  Popliteal, posterior tibial and anterior tibial arterial Doppler waveforms are triphasic  LEFT LOWER LIMB: No evidence of acute or chronic deep vein thrombosis  No evidence of superficial thrombophlebitis noted   Doppler evaluation shows a normal response to augmentation maneuvers  Popliteal, posterior tibial and anterior tibial arterial Doppler waveforms are triphasic  SIGNATURE: Electronically Signed by: Purnima Waldron MD, RPVI on 2022-04-04 09:43:28 PM      No current facility-administered medications for this encounter       Medications Discontinued During This Encounter   Medication Reason    magnesium sulfate 4 g/100 mL IVPB (premix) 4 g     sodium chloride 0 9 % infusion     sodium chloride 0 9 % bolus 500 mL     sodium chloride 0 9 % infusion     busPIRone (BUSPAR) tablet 10 mg     losartan (COZAAR) tablet 100 mg     sodium chloride 0 9 % infusion     potassium chloride (K-DUR,KLOR-CON) CR tablet 40 mEq     busPIRone (BUSPAR) tablet 10 mg     cefTRIAXone (ROCEPHIN) IVPB (premix in dextrose) 1,000 mg 50 mL     pantoprazole (PROTONIX) injection 40 mg     Lidocaine Viscous HCl (XYLOCAINE) 2 % mucosal solution 15 mL     metoprolol succinate (TOPROL-XL) 24 hr tablet 100 mg     busPIRone (BUSPAR) tablet 10 mg     pantoprazole (PROTONIX) EC tablet 40 mg     sodium chloride 0 9 % with KCl 20 mEq/L infusion (premix)     heparin (porcine) subcutaneous injection 5,000 Units     multi-electrolyte (PLASMALYTE-A/ISOLYTE-S PH 7 4) IV solution     sodium chloride 0 9 % infusion     ondansetron (ZOFRAN) injection 4 mg     metoprolol succinate (TOPROL-XL) 100 mg 24 hr tablet Stop Taking at Discharge    valsartan-hydrochlorothiazide (DIOVAN-HCT) 320-25 MG per tablet Stop Taking at Discharge    ibuprofen (MOTRIN) 600 mg tablet Stop Taking at Discharge    sodium bicarbonate tablet 650 mg Patient Discharge    pantoprazole (PROTONIX) injection 40 mg Patient Discharge    busPIRone (BUSPAR) tablet 10 mg Patient Discharge    metoprolol succinate (TOPROL-XL) 24 hr tablet 50 mg Patient Discharge    fluticasone (FLONASE) 50 mcg/act nasal spray 2 spray Patient Discharge    Lidocaine Viscous HCl (XYLOCAINE) 2 % mucosal solution 15 mL Patient Discharge    saccharomyces boulardii (FLORASTOR) capsule 250 mg Patient Discharge    loperamide (IMODIUM) capsule 2 mg Patient Discharge    folic acid (FOLVITE) tablet 1 mg Patient Discharge    guaiFENesin (ROBITUSSIN) oral solution 200 mg Patient Discharge    ondansetron (ZOFRAN) injection 4 mg Patient Discharge    acetaminophen (TYLENOL) tablet 650 mg Patient Discharge    diphenhydrAMINE (BENADRYL) tablet 25 mg Patient Discharge       Kevyn Carpenter PA-C    Portions of the record may have been created with voice recognition software  Occasional wrong word or "sound a like" substitutions may have occurred due to the inherent limitations of voice recognition software  Read the chart carefully and recognize, using context, where substitutions have occurred

## 2022-04-05 NOTE — DISCHARGE INSTR - AVS FIRST PAGE
Dear Ashwin Pisano,     It was our pleasure to care for you here at [facility]  It is our hope that we were always able to exceed the expected standards for your care during your stay  You were hospitalized due to gastroenteritis  You were cared for on the 2nd floor by LETTY Robbins with the [facility] who covers for your primary care physician (PCP), LETTY Hoover, while you were hospitalized  If you have any questions or concerns related to this hospitalization, you may contact us at [phone]  For follow up as well as any medication refills, we recommend that you follow up with your primary care physician  A registered nurse will reach out to you by phone within a few days after your discharge to answer any additional questions that you may have after going home  However, at this time we provide for you here, the most important instructions / recommendations at discharge:     Notable Medication Adjustments -   Toprol decreased to 50 mg daily  Continue to hold Diovan for now  Sodium bicarbonate 650 twice daily  Pantoprazole 40 mg daily- acid reducer  Folic acid 1 mg daily  Florastor- probiotics  Testing Required after Discharge -   BMP in one week   Important follow up information -   Follow-up with PCP, Nephrology, rheumatology, orthopedics, GI   Other Instructions -   Please refer to discharge instructions   Please review this entire after visit summary as additional general instructions including medication list, appointments, activity, diet, any pertinent wound care, and other additional recommendations from your care team that may be provided for you        Sincerely,     LETTY Robbins

## 2022-04-05 NOTE — ASSESSMENT & PLAN NOTE
· Patient is complaining of sharp shooting pain on the left arm while bending  · X-ray of the left shoulder shows calcific tendinitis  · Recommend warm compress, Tylenol for pain  · Outpatient Orthopedics referral

## 2022-04-05 NOTE — PROGRESS NOTES
Progress Note- Katie Wagner 63 y o  female MRN: [id number]    Unit/Bed#: -01 Encounter: [id number]      Assessment and Plan:    Barbara Gonzalez is a 62 y/o female who is s/p gastric bypass with anxiety/depression, HTN who presents to the ED on 3/28 with poo PO intake x 2-3 weeks and abdominal pain, n/v/d      1  Microcytic anemia   2  N/V/D  3  Poor PO intake   Pt's symptoms of n/v/d presumed to be infectious in origin with normal C diff and pt noting that she is no longer having n/v/d; pt also denies any further abdominal pain  Pt also had elevated LFTs that have since normalized, again suggesting acute viral infection  Pt underwent EGD and colonoscopy for microcytic anemia with Hgb ranging from 7-8 since 2/2022, as well as for complaints of "black/tarry BMs " EGD was completely WNL and colonoscopy noted small, internal hemorrhoids  Hgb today stable at 9 6 and pt has not had any further episodes of overt GI bleeding  Pt is to be discharged home today   -pt should be discharged on PPI daily   -monitor CBC as an OP  -monitor stool output and color   -if anemia persists, would recommend OP capsule study   -if poor PO intake persists, would recommend repeat EGD as an OP with biopsies to rule out underlying H pylori or celiac disease   -will send a message to OP [facility] to schedule pt for follow-up     ______________________________________________________________________    Subjective:     No overt GI bleeding  Pt no longer complains of pain or n/v  Pt notes that she feels "shaky" today but believes this is because she did not sleep well last night  Pt says she is eating today but her appetite is still not "completeloy" back       Medication Administration - last 24 hours from 04/04/2022 1112 to 04/05/2022 1112       Date/Time Order Dose Route Action Action by     04/04/2022 1716 acetaminophen (TYLENOL) tablet 650 mg 650 mg Oral Given Toni Branham RN     94/03/5398 7579 folic acid (FOLVITE) tablet 1 mg 1 mg Oral Given Lemon Cogan, RN     93/45/5319 8705 folic acid (FOLVITE) tablet 1 mg 1 mg Oral Given Migel Luciano, RN     04/05/2022 0818 saccharomyces boulardii (FLORASTOR) capsule 250 mg 250 mg Oral Given Lemon Cogan, RN     04/04/2022 1714 saccharomyces boulardii (FLORASTOR) capsule 250 mg 250 mg Oral Given Migel Luciano, RN     04/05/2022 0819 fluticasone (FLONASE) 50 mcg/act nasal spray 2 spray 2 spray Each Nare Given Lemon Cogan, RN     04/05/2022 0034 metoprolol succinate (TOPROL-XL) 24 hr tablet 50 mg 50 mg Oral Given Lemon Cogan, RN     04/04/2022 1714 metoprolol succinate (TOPROL-XL) 24 hr tablet 50 mg 50 mg Oral Given Migel Luciaon, RN     04/05/2022 0818 busPIRone (BUSPAR) tablet 10 mg 10 mg Oral Given Lemon Cogan, RN     04/04/2022 2145 busPIRone (BUSPAR) tablet 10 mg 10 mg Oral Given Lorna Wilson, RN     04/05/2022 0816 pantoprazole (PROTONIX) injection 40 mg 40 mg Intravenous Given Lemon Cogan, RN     04/04/2022 2145 pantoprazole (PROTONIX) injection 40 mg 40 mg Intravenous Given Lorna Wilson, RN     04/05/2022 0818 sodium bicarbonate tablet 650 mg 650 mg Oral Given Lemon Cogan, RN     04/04/2022 1714 sodium bicarbonate tablet 650 mg 650 mg Oral Given Migel Lucinao, RN     04/04/2022 1345 sodium chloride 0 9 % infusion   Intravenous Anesthesia Volume Adjustment Alton Glatter, CRNA     04/04/2022 1329 sodium chloride 0 9 % infusion   Intravenous Restarted Alton Glatter, CRNA     04/04/2022 1328 sodium chloride 0 9 % infusion   Intravenous Paused Alton Glatter, CRNA     04/04/2022 1320 sodium chloride 0 9 % infusion   Intravenous Continue from [address] [place] [address], CRNA     04/04/2022 1315 sodium chloride 0 9 % infusion   Intravenous Restarted Alton Glatter, CRNA     04/04/2022 1314 sodium chloride 0 9 % infusion   Intravenous Paused Alton Glatter, CRNA     04/04/2022 1911 potassium chloride 20 mEq IVPB (premix) 0 mEq Intravenous Austyn YI RENUKA Pacheco          Objective:     Vitals: Blood pressure 139/72, pulse 86, temperature 98 3 °F (36 8 °C), resp  rate 18, height 5' (1 524 m), weight 73 5 kg (162 lb), SpO2 99 %  ,Body mass index is 31 64 kg/m²  Intake/Output Summary (Last 24 hours) at 4/5/2022 1112  Last data filed at 4/4/2022 1911  Gross per 24 hour   Intake 610 ml   Output --   Net 610 ml       Physical Exam:   General Appearance: Awake and alert, in no acute distress  Abdomen: Soft, non-tender, non-distended; bowel sounds normal; no masses or no organomegaly    Invasive Devices  Report    Peripheral Intravenous Line            Peripheral IV 04/03/22 Left Forearm 2 days                Lab Results:  No results displayed because visit has over 200 results  Imaging Studies: I have personally reviewed pertinent imaging studies

## 2022-04-05 NOTE — DISCHARGE INSTRUCTIONS
Acute Kidney Injury   WHAT YOU NEED TO KNOW:   Acute kidney injury (BALJINDER) is also called acute kidney failure, or acute renal failure  BALJINDER happens when your kidneys suddenly stop working correctly  Normally, the kidneys remove fluid, chemicals, and waste from your blood  These wastes are turned into urine by your kidneys  BALJINDER usually happens over hours or days  When you have BALJINDER, your kidneys do not remove the waste, chemicals, or extra fluid from your body  A normal amount of urine is not produced  BALJINDER is usually temporary, it can take days to months to recover  BALJINDER can also become a chronic kidney condition  DISCHARGE INSTRUCTIONS:   Call 911 if:   · You have sudden chest pain or trouble breathing  Seek care immediately if:   · Your symptoms get worse  Contact your healthcare provider if:   · Your symptoms return  · Your blood sugar or blood pressure level is not within the range your healthcare provider recommends  · You have questions or concerns about your condition or care  Nutrition:  Your healthcare provider may tell you to eat food low in sodium (salt), potassium, phosphorus, or protein  A dietitian can help you plan your meals  Drink liquids as directed: Your healthcare provider may recommend that you drink a certain amount of liquids  This will help your kidneys work better and decrease your risk for dehydration  Ask how much liquid to drink each day and which liquids are best for you  Prevent acute kidney injury:   · Manage other health conditions  such as diabetes, high blood pressure, or heart disease  These conditions increase your risk for acute kidney injury  Take your medicines for these conditions as directed  Also, monitor your blood sugar and blood pressure levels as directed  Contact your healthcare provider if your levels are not in the range he or she says it should be  · Talk to your healthcare provider before you take over-the-counter-medicine  NSAIDs, stomach medicine, or laxatives may harm your kidneys and increase your risk for acute kidney injury  If it is okay to take the medicine, follow the directions on the package  Do not take more than directed  · Tell healthcare providers you have had acute kidney injury  before you get contrast liquid for an x-ray or CT scan  Your healthcare provider may give you medicine to prevent kidney problems caused by the liquid  Follow up with your healthcare provider as directed: You will need to return for more tests to make sure your kidneys are working properly  You may also be referred to a kidney specialist  Write down your questions so you remember to ask them during your visits  © Copyright [employer] 2022 Information is for End User's use only and may not be sold, redistributed or otherwise used for commercial purposes  All illustrations and images included in CareNotes® are the copyrighted property of A D A M , Inc  or Pancho Akins   The above information is an  only  It is not intended as medical advice for individual conditions or treatments  Talk to your doctor, nurse or pharmacist before following any medical regimen to see if it is safe and effective for you  Gastroenteritis   WHAT YOU NEED TO KNOW:   Gastroenteritis, or stomach flu, is an infection of the stomach and intestines  DISCHARGE INSTRUCTIONS:   Call 911 for any of the following:   · You have trouble breathing or a very fast pulse  Seek care immediately if:   · You see blood in your diarrhea  · You cannot stop vomiting  · You have not urinated for 12 hours  · You feel like you are going to faint  Contact your healthcare provider if:   · You have a fever  · You continue to vomit or have diarrhea, even after treatment  · You see worms in your diarrhea  · Your mouth or eyes are dry  You are not urinating as much or as often      · You have questions or concerns about your condition or care  Medicines:   · Medicines  may be given to stop vomiting or diarrhea, decrease abdominal cramps, or treat an infection  · Take your medicine as directed  Contact your healthcare provider if you think your medicine is not helping or if you have side effects  Tell him or her if you are allergic to any medicine  Keep a list of the medicines, vitamins, and herbs you take  Include the amounts, and when and why you take them  Bring the list or the pill bottles to follow-up visits  Carry your medicine list with you in case of an emergency  Manage your symptoms:   · Drink liquids as directed  Ask your healthcare provider how much liquid to drink each day, and which liquids are best for you  You may also need to drink an oral rehydration solution (ORS)  An ORS has the right amounts of sugar, salt, and minerals in water to replace body fluids  · Eat bland foods  When you feel hungry, begin eating soft, bland foods  Examples are bananas, clear soup, potatoes, and applesauce  Do not have dairy products, alcohol, sugary drinks, or drinks with caffeine until you feel better  · Rest as much as possible  Slowly start to do more each day when you begin to feel better  Prevent the spread of gastroenteritis:  Gastroenteritis can spread easily  Keep yourself, your family, and your surroundings clean to help prevent the spread of gastroenteritis:  · Wash your hands often  Use soap and water  Wash your hands after you use the bathroom, change a child's diapers, or sneeze  Wash your hands before you prepare or eat food  · Clean surfaces and do laundry often  Wash your clothes and towels separately from the rest of the laundry  Clean surfaces in your home with antibacterial  or bleach  · Clean food thoroughly and cook safely  Wash raw vegetables before you cook  Cook meat, fish, and eggs fully  Do not use the same dishes for raw meat as you do for other foods   Refrigerate any leftover food immediately  · Be aware when you camp or travel  Drink only clean water  Do not drink from rivers or lakes unless you purify or boil the water first  When you travel, drink bottled water and do not add ice  Do not eat fruit that has not been peeled  Do not eat raw fish or meat that is not fully cooked  Follow up with your doctor as directed:  Write down your questions so you remember to ask them during your visits  © Copyright [employer] 2022 Information is for End User's use only and may not be sold, redistributed or otherwise used for commercial purposes  All illustrations and images included in CareNotes® are the copyrighted property of A [employer] A M [employer] Inc  or [facility] Maribel Akins   The above information is an  only  It is not intended as medical advice for individual conditions or treatments  Talk to your doctor, nurse or pharmacist before following any medical regimen to see if it is safe and effective for you

## 2022-04-05 NOTE — PLAN OF CARE
Problem: PAIN - ADULT  Goal: Verbalizes/displays adequate comfort level or baseline comfort level  Description: Interventions:  - Encourage patient to monitor pain and request assistance  - Assess pain using appropriate pain scale  - Administer analgesics based on type and severity of pain and evaluate response  - Implement non-pharmacological measures as appropriate and evaluate response  - Consider cultural and social influences on pain and pain management  - Notify physician/advanced practitioner if interventions unsuccessful or patient reports new pain  Outcome: Adequate for Discharge     Problem: INFECTION - ADULT  Goal: Absence or prevention of progression during hospitalization  Description: INTERVENTIONS:  - Assess and monitor for signs and symptoms of infection  - Monitor lab/diagnostic results  - Monitor all insertion sites, i e  indwelling lines, tubes, and drains  - Monitor endotracheal if appropriate and nasal secretions for changes in amount and color  - [place] appropriate cooling/warming therapies per order  - Administer medications as ordered  - Instruct and encourage patient and family to use good hand hygiene technique  - Identify and instruct in appropriate isolation precautions for identified infection/condition  Outcome: Adequate for Discharge  Goal: Absence of fever/infection during neutropenic period  Description: INTERVENTIONS:  - Monitor WBC    Outcome: Adequate for Discharge     Problem: DISCHARGE PLANNING  Goal: Discharge to home or other facility with appropriate resources  Description: INTERVENTIONS:  - Identify barriers to discharge w/patient and caregiver  - Arrange for needed discharge resources and transportation as appropriate  - Identify discharge learning needs (meds, wound care, etc )  - Arrange for interpretive services to assist at discharge as needed  - Refer to Case Management Department for coordinating discharge planning if the patient needs post-hospital services based on physician/advanced practitioner order or complex needs related to functional status, cognitive ability, or social support system  Outcome: Adequate for Discharge     Problem: Knowledge Deficit  Goal: Patient/family/caregiver demonstrates understanding of disease process, treatment plan, medications, and discharge instructions  Description: Complete learning assessment and assess knowledge base    Interventions:  - Provide teaching at level of understanding  - Provide teaching via preferred learning methods  Outcome: Adequate for Discharge     Problem: Potential for Falls  Goal: Patient will remain free of falls  Description: INTERVENTIONS:  - Educate patient/family on patient safety including physical limitations  - Instruct patient to call for assistance with activity   - Consult OT/PT to assist with strengthening/mobility   - Keep Call bell within reach  - Keep bed low and locked with side rails adjusted as appropriate  - Keep care items and personal belongings within reach  - Initiate and maintain comfort rounds  - Make Fall Risk Sign visible to staff  - Apply yellow socks and bracelet for high fall risk patients  - Consider moving patient to room near nurses station  Outcome: Adequate for Discharge     Problem: HEMATOLOGIC - ADULT  Goal: Maintains hematologic stability  Description: INTERVENTIONS  - Assess for signs and symptoms of bleeding or hemorrhage  - Monitor labs  - Administer supportive blood products/factors as ordered and appropriate  Outcome: Adequate for Discharge

## 2022-04-08 ENCOUNTER — OFFICE VISIT (OUTPATIENT)
Dept: FAMILY MEDICINE CLINIC | Facility: CLINIC | Age: 62
End: 2022-04-08

## 2022-04-08 VITALS
OXYGEN SATURATION: 100 % | TEMPERATURE: 99.5 F | BODY MASS INDEX: 33.16 KG/M2 | HEIGHT: 60 IN | SYSTOLIC BLOOD PRESSURE: 126 MMHG | WEIGHT: 168.9 LBS | DIASTOLIC BLOOD PRESSURE: 78 MMHG | HEART RATE: 101 BPM

## 2022-04-08 DIAGNOSIS — F32.9 MAJOR DEPRESSIVE DISORDER WITH CURRENT ACTIVE EPISODE, UNSPECIFIED DEPRESSION EPISODE SEVERITY, UNSPECIFIED WHETHER RECURRENT: ICD-10-CM

## 2022-04-08 DIAGNOSIS — M79.602 LEFT ARM PAIN: ICD-10-CM

## 2022-04-08 DIAGNOSIS — E66.09 CLASS 1 OBESITY DUE TO EXCESS CALORIES WITHOUT SERIOUS COMORBIDITY WITH BODY MASS INDEX (BMI) OF 33.0 TO 33.9 IN ADULT: ICD-10-CM

## 2022-04-08 DIAGNOSIS — D53.9 MACROCYTIC ANEMIA: ICD-10-CM

## 2022-04-08 DIAGNOSIS — Z76.89 ENCOUNTER FOR SUPPORT AND COORDINATION OF TRANSITION OF CARE: Primary | ICD-10-CM

## 2022-04-08 DIAGNOSIS — I10 ESSENTIAL HYPERTENSION: ICD-10-CM

## 2022-04-08 DIAGNOSIS — N17.9 ACUTE KIDNEY INJURY (HCC): ICD-10-CM

## 2022-04-08 DIAGNOSIS — Z98.84 S/P GASTRIC BYPASS: ICD-10-CM

## 2022-04-08 DIAGNOSIS — K52.9 GASTROENTERITIS: ICD-10-CM

## 2022-04-08 PROBLEM — Z12.4 PAP SMEAR FOR CERVICAL CANCER SCREENING: Status: RESOLVED | Noted: 2021-09-29 | Resolved: 2022-04-08

## 2022-04-08 PROBLEM — K14.6 PAINFUL TONGUE: Status: RESOLVED | Noted: 2022-03-28 | Resolved: 2022-04-08

## 2022-04-08 PROBLEM — R00.1 BRADYCARDIA: Status: RESOLVED | Noted: 2022-03-31 | Resolved: 2022-04-08

## 2022-04-08 PROBLEM — N95.0 POSTMENOPAUSAL BLEEDING: Status: RESOLVED | Noted: 2021-09-29 | Resolved: 2022-04-08

## 2022-04-08 PROCEDURE — 99496 TRANSJ CARE MGMT HIGH F2F 7D: CPT | Performed by: NURSE PRACTITIONER

## 2022-04-08 RX ORDER — METOPROLOL SUCCINATE 50 MG/1
50 TABLET, EXTENDED RELEASE ORAL DAILY
Qty: 30 TABLET | Refills: 1 | Status: SHIPPED | OUTPATIENT
Start: 2022-04-08 | End: 2022-05-08

## 2022-04-08 RX ORDER — DULOXETIN HYDROCHLORIDE 20 MG/1
20 CAPSULE, DELAYED RELEASE ORAL DAILY
Qty: 30 CAPSULE | Refills: 1 | Status: SHIPPED | OUTPATIENT
Start: 2022-04-08

## 2022-04-08 NOTE — PROGRESS NOTES
Transition of Care  Follow-up After Hospitalization    Masoud Wild 63 y o  female   Date:  4/8/2022    TCM Call (since 3/8/2022)     Date and time call was made  4/5/2022 12:01 PM    Hospital care reviewed  Records reviewed        Patient was hospitialized at  [address]        Date of Admission  03/28/22    Date of discharge  04/05/22    Diagnosis  Acute kidney injury     Disposition  Home      TCM Call (since 3/8/2022)     Should patient be enrolled in anticoag monitoring? No    Scheduled for follow up? Yes    Did you obtain your prescribed medications  Yes    Do you need help managing your prescriptions or medications  No    Is transportation to your appointment needed  No    I have advised the patient to call PCP with any new or worsening symptoms  Lurlene Kanner Andrukaitis RMA     Are you recieving any outpatient services  No    Are you recieving home care services  No    Are you using any community resources  No    Current waiver services  No    Have you fallen in the last 12 months  No    Interperter language line needed  No    Counseling  Patient          Hospital records were reviewed  Medications upon discharge reviewed/updated  Medication Changes: hold Diovan-HCT, start bicarb BID,   Imaging: XR L shoulder and elbow, VAS BLLE, CXR, CT ABD/Pelvis  Consults: Nephrology, Orthopedics OP, Gastroenterology  Follow up visits with other specialists: Nephrology, Orthopedics, Gastroenterology      Assessment and Plan:    Diana Raymundo was seen today for transition of care management      Diagnoses and all orders for this visit:    Encounter for support and coordination of transition of care    Left arm pain    S/P gastric bypass    Macrocytic anemia  -     CBC and differential; Future    Class 1 obesity due to excess calories without serious comorbidity with body mass index (BMI) of 33 0 to 33 9 in adult    Acute kidney injury ([facility] [address] )    Essential hypertension  -     metoprolol succinate (TOPROL-XL) 50 mg 24 hr tablet; Take 1 tablet (50 mg total) by mouth daily    Gastroenteritis    Major depressive disorder with current active episode, unspecified depression episode severity, unspecified whether recurrent  -     DULoxetine (CYMBALTA) 20 mg capsule; Take 1 capsule (20 mg total) by mouth daily            Rowena Castellanos present for TCM visit s/p hospitalization for viral gastroenteritis with diarrhea  Diarrhea has resolved  During hospitalization had colonoscopy 2/2 acute on chronic anemia  Colonoscopy unremarkable for active bleed  Given 2 UPRBC andH/H stabilized but will continue to monitor OP and if H/H continues to decrease, Pt may be candidate for capsule study  Pt was also seen by nephrology 2/2 BALJINDER w/ hyponatremia  Pt will continue Bicarb as directed and hold Diovan-HCT until further evaluated by Nephrology  Other notable referral include Ortho 2/2 LUE pain when she bends down and rheumatology 2/2 elevated CRP with mucositis and erythema of hands and feet  Hypertension  This is a chronic problem  There are no associated agents to hypertension  Risk factors for coronary artery disease include obesity, post-menopausal state and dyslipidemia  There are no compliance problems  Hypertensive end-organ damage includes CAD/MI  Arm Pain   The pain is present in the left elbow and left shoulder  Depression  This is a chronic problem  Associated symptoms include arthralgias  Treatments tried: duloxetine in past after   - rosalind effective  Wants to restart  F/U in 6 weeks  ROS: Review of Systems   Constitutional: Negative  HENT: Negative  Eyes: Negative  Respiratory: Negative  Cardiovascular: Negative  Gastrointestinal: Negative  Endocrine: Negative  Genitourinary: Negative  Musculoskeletal: Positive for arthralgias  Skin: Negative  Allergic/Immunologic: Negative  Neurological: Negative  Hematological: Negative  Psychiatric/Behavioral: Positive for depression  Past Medical History:   Diagnosis Date    Acute cystitis without hematuria     Acute kidney injury ([facility] ) 2022    hospital admission    Anxiety     Bradycardia     Depression     Gastroenteritis     Heart attack ([facility] ) 2006    History of blood transfusion     History of depression     Hypertension     Macrocytic anemia 2022    hospital admission    Past heart attack 2006       Past Surgical History:   Procedure Laterality Date    BREAST CYST EXCISION Right 1999  benign    BREAST SURGERY Right 1999    calcifications      SECTION  1979, 1985    CHOLECYSTECTOMY      GASTRIC BYPASS      OOPHORECTOMY Right        Social History     Socioeconomic History    Marital status: /Civil Union     Spouse name: None    Number of children: None    Years of education: None    Highest education level: None   Occupational History     Employer: [employer]   Tobacco Use    Smoking status: Never Smoker    Smokeless tobacco: Never Used   Vaping Use    Vaping Use: Never used   Substance and Sexual Activity    Alcohol use: Yes     Alcohol/week: 3 0 standard drinks     Types: 3 Shots of liquor per week    Drug use: Never    Sexual activity: Yes     Partners: Male   Other Topics Concern    None   Social History Narrative    None     Social Determinants of Health     Financial Resource Strain: Not on file   Food Insecurity: No Food Insecurity    Worried About Running Out of Food in the Last Year: Never true    Mark of Food in the Last Year: Never true   Transportation Needs: No Transportation Needs    Lack of Transportation (Medical): No    Lack of Transportation (Non-Medical):  No   Physical Activity: Not on file   Stress: Not on file   Social Connections: Not on file   Intimate Partner Violence: Not on file   Housing Stability: Low Risk     Unable to Pay for Housing in the Last Year: No    Number of Places Lived in the Last Year: 1    Unstable Housing in the Last Year: No       Family History   Problem Relation Age of Onset    Diabetes Mother     Heart attack Father     Diabetes Sister     Substance Abuse Brother     Diabetes Brother     No Known Problems Maternal Grandmother     Breast cancer Paternal Grandmother     No Known Problems Sister     No Known Problems Sister     No Known Problems Maternal Aunt     No Known Problems Maternal Aunt     No Known Problems Maternal Aunt     No Known Problems Paternal Aunt     No Known Problems Paternal Aunt     No Known Problems Paternal Aunt        Allergies   Allergen Reactions    Oxycodone-Acetaminophen Hives     per t-system      Propoxyphene Hives    Shellfish Allergy - Food Allergy          Current Outpatient Medications:     busPIRone (BUSPAR) 10 mg tablet, Take 1 tablet (10 mg total) by mouth 2 (two) times a day (Patient taking differently: Take 10 mg by mouth in the morning  ), Disp: 180 tablet, Rfl: 1    diphenhydrAMINE (Benadryl Allergy) 25 mg capsule, Take 25 mg by mouth every 6 (six) hours as needed for itching   (Patient not taking: Reported on 4/8/2022 ), Disp: , Rfl:     DULoxetine (CYMBALTA) 20 mg capsule, Take 1 capsule (20 mg total) by mouth daily, Disp: 30 capsule, Rfl: 1    estradiol (ESTRACE) 0 1 mg/g vaginal cream, Insert 1 g into the vagina 3 (three) times a week Apply a pea sized amount to external vaginal opening and urethra area twice weekly, Disp: 42 5 g, Rfl: 3    fluticasone (FLONASE) 50 mcg/act nasal spray, 2 sprays into each nostril daily (Patient not taking: Reported on 4/8/2022 ), Disp: 16 g, Rfl: 0    folic acid (FOLVITE) 1 mg tablet, Take 1 tablet (1 mg total) by mouth daily (Patient not taking: Reported on 4/8/2022 ), Disp: 30 tablet, Rfl: 0    Lidocaine Viscous HCl (XYLOCAINE) 2 % mucosal solution, Swish and spit 15 mL 4 (four) times a day as needed for mouth pain or discomfort or mucositis (Patient not taking: Reported on 4/8/2022 ), Disp: 100 mL, Rfl: 0    metoprolol succinate (TOPROL-XL) 50 mg 24 hr tablet, Take 1 tablet (50 mg total) by mouth daily, Disp: 30 tablet, Rfl: 1    pantoprazole (PROTONIX) 40 mg tablet, Take 1 tablet (40 mg total) by mouth daily (Patient not taking: Reported on 4/8/2022 ), Disp: 30 tablet, Rfl: 0    polyethylene glycol (MiraLax) 17 GM/SCOOP powder, Take 238 g by mouth once for 1 dose Take 238 g my mouth  Take first half at 5pm, Take second half at 6am, Disp: 238 g, Rfl: 0    saccharomyces boulardii (FLORASTOR) 250 mg capsule, Take 1 capsule (250 mg total) by mouth 2 (two) times a day (Patient not taking: Reported on 4/8/2022 ), Disp: 60 capsule, Rfl: 0    sodium bicarbonate 650 mg tablet, Take 1 tablet (650 mg total) by mouth 2 (two) times daily after meals (Patient not taking: Reported on 4/8/2022 ), Disp: 60 tablet, Rfl: 0      Physical Exam:  /78 (BP Location: Left arm, Patient Position: Sitting, Cuff Size: Large)   Pulse 101   Temp 99 5 °F (37 5 °C) (Tympanic)   Ht 5' (1 524 m)   Wt 76 6 kg (168 lb 14 4 oz)   SpO2 100%   BMI 32 99 kg/m²     Physical Exam  Vitals and nursing note reviewed  Constitutional:       Appearance: Normal appearance  She is well-developed  Interventions: Face mask in place  HENT:      Head: Normocephalic and atraumatic  Right Ear: Tympanic membrane, ear canal and external ear normal       Left Ear: Tympanic membrane, ear canal and external ear normal       Nose: Nose normal       Mouth/Throat:      Mouth: Mucous membranes are moist       Pharynx: Uvula midline  Eyes:      General: Lids are normal       Conjunctiva/sclera: Conjunctivae normal       Pupils: Pupils are equal, round, and reactive to light  Neck:      Thyroid: No thyroid mass or thyromegaly  Vascular: No JVD  Trachea: Trachea and phonation normal    Cardiovascular:      Rate and Rhythm: Normal rate and regular rhythm  Pulses: Normal pulses        Heart sounds: Normal heart sounds, S1 normal and S2 normal  No murmur heard  No friction rub  No gallop  Pulmonary:      Effort: Pulmonary effort is normal       Breath sounds: Normal breath sounds  Abdominal:      General: Bowel sounds are normal       Palpations: Abdomen is soft  Tenderness: There is no abdominal tenderness  Genitourinary:     Comments: Deferred   Musculoskeletal:         General: Normal range of motion  Cervical back: Full passive range of motion without pain, normal range of motion and neck supple  Right lower leg: No edema  Left lower leg: No edema  Lymphadenopathy:      Head:      Right side of head: No submental, submandibular, tonsillar, preauricular, posterior auricular or occipital adenopathy  Left side of head: No submental, submandibular, tonsillar, preauricular, posterior auricular or occipital adenopathy  Cervical: No cervical adenopathy  Skin:     General: Skin is warm and dry  Capillary Refill: Capillary refill takes less than 2 seconds  Neurological:      General: No focal deficit present  Mental Status: She is alert and oriented to person, place, and time  Cranial Nerves: Cranial nerves are intact  No cranial nerve deficit  Sensory: Sensation is intact  Motor: Motor function is intact  Coordination: Coordination is intact  Gait: Gait is intact  Deep Tendon Reflexes: Reflexes are normal and symmetric  Psychiatric:         Attention and Perception: Attention and perception normal          Mood and Affect: Mood and affect normal          Speech: Speech normal          Behavior: Behavior normal  Behavior is cooperative  Thought Content:  Thought content normal          Cognition and Memory: Cognition normal          Judgment: Judgment normal              Labs:  Lab Results   Component Value Date    WBC 5 54 04/05/2022    HGB 9 6 (L) 04/05/2022    HCT 29 9 (L) 04/05/2022     (H) 04/05/2022     04/05/2022     Lab Results   Component Value Date K 3 8 04/05/2022     (H) 04/05/2022    CO2 19 (L) 04/05/2022    BUN 5 04/05/2022    CREATININE 1 14 04/05/2022    GLUF 114 (H) 12/23/2020    CALCIUM 8 7 04/05/2022    CORRECTEDCA 9 2 04/01/2022    AST 22 04/01/2022    ALT 16 04/01/2022    ALKPHOS 91 04/01/2022    EGFR 52 04/05/2022

## 2022-04-11 ENCOUNTER — TELEPHONE (OUTPATIENT)
Dept: NEPHROLOGY | Facility: CLINIC | Age: 62
End: 2022-04-11

## 2022-04-11 NOTE — TELEPHONE ENCOUNTER
Appointment Confirmation   Person confirmed appointment with  If not patient, name of the person LMOVM   Date and time of appointment 4/12/22 130pm    Patient acknowledged and will be at appointment? no    Did you advise the patient that they will need a urine sample if they are a new patient?  N/A    Did you advise the patient to bring their current medications for verification? (including any OTC) Yes    Additional Information

## 2022-04-12 ENCOUNTER — OFFICE VISIT (OUTPATIENT)
Dept: NEPHROLOGY | Facility: CLINIC | Age: 62
End: 2022-04-12

## 2022-04-12 VITALS
SYSTOLIC BLOOD PRESSURE: 136 MMHG | WEIGHT: 163 LBS | HEART RATE: 95 BPM | BODY MASS INDEX: 32 KG/M2 | DIASTOLIC BLOOD PRESSURE: 84 MMHG | HEIGHT: 60 IN | OXYGEN SATURATION: 99 %

## 2022-04-12 DIAGNOSIS — E83.42 HYPOMAGNESEMIA: ICD-10-CM

## 2022-04-12 DIAGNOSIS — B37.0 THRUSH, ORAL: ICD-10-CM

## 2022-04-12 DIAGNOSIS — N17.9 ACUTE KIDNEY INJURY (HCC): Primary | ICD-10-CM

## 2022-04-12 DIAGNOSIS — K13.79 SORE MOUTH: ICD-10-CM

## 2022-04-12 PROCEDURE — 99215 OFFICE O/P EST HI 40 MIN: CPT | Performed by: PHYSICIAN ASSISTANT

## 2022-04-12 NOTE — ASSESSMENT & PLAN NOTE
Reports mouth is sore on right side causing her to not want to swallow solid food  Will treat thrush, which can cause pain but also recommend dentist evaluation

## 2022-04-12 NOTE — PROGRESS NOTES
Assessment & Plan:    1  Acute kidney injury [facility])  Assessment & Plan:    Resolved as of discharge, but I have concerns as patient has not been eating well and continues with some diarrhea  Will repeat labs  Orders:  -     nystatin (MYCOSTATIN) 500,000 units/5 mL suspension; Apply 5 mL (500,000 Units total) to the mouth or throat 4 (four) times a day for 14 days  -     Comprehensive metabolic panel; Future; Expected date: 04/13/2022  -     Magnesium; Future; Expected date: 04/13/2022  -     Phosphorus; Future; Expected date: 04/13/2022  -     CBC and differential; Future; Expected date: 04/13/2022  -     Urinalysis with microscopic; Future; Expected date: 04/13/2022  -     Sodium, urine, random; Future; Expected date: 04/13/2022  -     Creatinine, urine, random; Future; Expected date: 04/13/2022    2  Thrush, oral  Assessment & Plan:  Nystatin swish and swallow    Orders:  -     nystatin (MYCOSTATIN) 500,000 units/5 mL suspension; Apply 5 mL (500,000 Units total) to the mouth or throat 4 (four) times a day for 14 days    3  Hypomagnesemia  Assessment & Plan:  Re-evaluate magnesium    Orders:  -     Magnesium; Future; Expected date: 04/13/2022    4  Sore mouth  Assessment & Plan:  Reports mouth is sore on right side causing her to not want to swallow solid food  Recommend dentist evaluation  The benefits, risks and alternatives to the treatment plan were discussed at this visit  Patient was advised of common adverse effects of any medical therapies prescribed  All questions were answered and discussed with the patient and any accompanying family members or caretakers  Subjective:      Patient ID: Jerald Zuluaga is a 63 y o  female seen in the [facility]  HPI     Patient was seen at [facility] from 03/28/2022 through 04/05/2022 for acute kidney injury with multiple electrolyte derangements  Today, patient presents for follow-up of hospitalization   She complains of sore mouth and trouble swallowing due to pain which has impacted her ability to eat  She has been avoiding solid foods due to pain  She reports no improvement with lidocaine mouthwash  She has not seen a dentist in quite some time  Blood pressure is 136/84 HR 95  She reports adherence with metoprolol succinate  She does not log home blood pressures  She was discharged on 04/05/2021 with a creatinine of 1 1 mg/dL with estimated GFR 52 mL/min and serum sodium 141 millimole per L with potassium 3 8 millimole per L  The following portions of the patient's history were reviewed and updated as appropriate: allergies, current medications, past family history, past medical history, past social history, past surgical history, and problem list     Review of Systems   Constitutional: Negative for activity change, appetite change, chills, fatigue, fever and unexpected weight change  Not eating much due to sore mouth/ trouble swallowing   HENT: Positive for trouble swallowing (reports this was happening in the hospital)  "mouth wash"   Respiratory: Negative for apnea, cough and shortness of breath  Cardiovascular: Negative for chest pain, palpitations and leg swelling  Gastrointestinal: Positive for diarrhea  Negative for abdominal pain, blood in stool, constipation, nausea and vomiting  Genitourinary: Negative for decreased urine volume, difficulty urinating, dysuria, flank pain, frequency, hematuria and urgency  Musculoskeletal: Negative for arthralgias, back pain, joint swelling and myalgias  Skin: Negative for color change and rash  Neurological: Negative for dizziness, seizures, syncope, weakness, light-headedness, numbness and headaches  Hematological: Negative for adenopathy  Does not bruise/bleed easily  Psychiatric/Behavioral: Negative for agitation, behavioral problems, confusion, decreased concentration, dysphoric mood and hallucinations   The patient is not nervous/anxious and is not hyperactive  All other systems reviewed and are negative  Objective:      /84   Pulse 95   Ht 5' (1 524 m)   Wt 73 9 kg (163 lb)   SpO2 99%   BMI 31 83 kg/m²          Physical Exam  Vitals and nursing note reviewed  Exam conducted with a chaperone present  Constitutional:       General: She is not in acute distress  Appearance: Normal appearance  She is normal weight  She is not ill-appearing or toxic-appearing  HENT:      Head: Normocephalic and atraumatic  Nose: Nose normal  No congestion or rhinorrhea  Mouth/Throat:      Mouth: Mucous membranes are moist       Pharynx: Oropharynx is clear  Comments: Thick white curdy deposits  Eyes:      General: No scleral icterus  Right eye: No discharge  Left eye: No discharge  Extraocular Movements: Extraocular movements intact  Conjunctiva/sclera: Conjunctivae normal       Pupils: Pupils are equal, round, and reactive to light  Cardiovascular:      Rate and Rhythm: Normal rate and regular rhythm  Pulses: Normal pulses  Heart sounds: Normal heart sounds  No murmur heard  Pulmonary:      Effort: Pulmonary effort is normal  No respiratory distress  Breath sounds: Normal breath sounds  No wheezing  Abdominal:      General: Abdomen is flat  Bowel sounds are normal  There is no distension  Palpations: Abdomen is soft  There is no mass  Tenderness: There is no abdominal tenderness  Musculoskeletal:         General: No swelling or deformity  Normal range of motion  Cervical back: Normal range of motion and neck supple  No rigidity or tenderness  Skin:     General: Skin is warm and dry  Coloration: Skin is not jaundiced or pale  Findings: No bruising  Neurological:      General: No focal deficit present  Mental Status: She is alert and oriented to person, place, and time  Mental status is at baseline     Psychiatric:         Mood and Affect: Mood normal          Behavior: Behavior normal          Thought Content:  Thought content normal          Judgment: Judgment normal              Lab Results   Component Value Date    SODIUM 141 04/05/2022    K 3 8 04/05/2022     (H) 04/05/2022    CO2 19 (L) 04/05/2022    AGAP 12 04/05/2022    BUN 5 04/05/2022    CREATININE 1 14 04/05/2022    GLUC 60 (L) 04/05/2022    GLUF 114 (H) 12/23/2020    CALCIUM 8 7 04/05/2022    AST 22 04/01/2022    ALT 16 04/01/2022    ALKPHOS 91 04/01/2022    TP 4 9 (L) 04/01/2022    TBILI 0 47 04/01/2022    EGFR 52 04/05/2022      Lab Results   Component Value Date    CREATININE 1 14 04/05/2022    CREATININE 1 17 04/04/2022    CREATININE 1 62 (H) 04/03/2022    CREATININE 1 39 (H) 04/02/2022    CREATININE 1 40 (H) 04/01/2022    CREATININE 1 32 (H) 03/31/2022    CREATININE 1 37 (H) 03/30/2022    CREATININE 1 39 (H) 03/29/2022    CREATININE 1 52 (H) 03/29/2022    CREATININE 1 47 (H) 03/29/2022    CREATININE 1 51 (H) 03/28/2022    CREATININE 1 56 (H) 03/28/2022    CREATININE 1 73 (H) 03/28/2022    CREATININE 1 22 02/02/2022    CREATININE 0 84 12/23/2020      Lab Results   Component Value Date    COLORU Yellow 03/28/2022    CLARITYU Clear 03/28/2022    SPECGRAV 1 010 03/28/2022    PHUR 6 0 03/28/2022    LEUKOCYTESUR 2+ (A) 03/28/2022    NITRITE Negative 03/28/2022    PROTEIN UA Negative 03/28/2022    GLUCOSEU Negative 03/28/2022    KETONESU Negative 03/28/2022    UROBILINOGEN 0 2 03/28/2022    BILIRUBINUR Negative 03/28/2022    BLOODU Negative 03/28/2022    RBCUA None Seen 03/28/2022    WBCUA 20-30 (A) 03/28/2022    EPIS Occasional 03/28/2022    BACTERIA Innumerable (A) 03/28/2022      No results found for: LABPROT  No results found for: [facility]  Lab Results   Component Value Date    WBC 5 54 04/05/2022    HGB 9 6 (L) 04/05/2022    HCT 29 9 (L) 04/05/2022     (H) 04/05/2022     04/05/2022      Lab Results   Component Value Date    HGB 9 6 (L) 04/05/2022    HGB 8 9 (L) 04/04/2022    HGB 7 9 (L) 04/03/2022    HGB 8 9 (L) 04/02/2022    HGB 6 0 (LL) 04/01/2022      Lab Results   Component Value Date    IRON 233 (H) 03/28/2022    TIBC 272 03/28/2022    FERRITIN 402 (H) 03/28/2022      No results found for: PTHCALCIUM, [id number], PHOSPHORUS   Lab Results   Component Value Date    CHOLESTEROL 204 (H) 06/09/2020    HDL 68 06/09/2020    LDLCALC 98 06/09/2020    TRIG 190 (H) 06/09/2020      Lab Results   Component Value Date    URICACID 5 0 05/16/2016      Lab Results   Component Value Date    HGBA1C 6 0 06/19/2020      Lab Results   Component Value Date    FREET4 1 23 03/28/2022      Lab Results   Component Value Date    SUKHDEEP Negative 03/31/2022      No results found for: PROT, UPEP, IMMUNOFIX, KAPPALAMBDA, KAPPALIGHT     Portions of the record may have been created with voice recognition software  Occasional wrong word or "sound a like" substitutions may have occurred due to the inherent limitations of voice recognition software  Read the chart carefully and recognize, using context, where substitutions have occurred  If you have any questions, please contact the dictating provider

## 2022-04-12 NOTE — ASSESSMENT & PLAN NOTE
Resolved as of discharge, but I have concerns as patient has not been eating well and continues with some diarrhea  Will repeat labs

## 2022-05-18 ENCOUNTER — APPOINTMENT (EMERGENCY)
Dept: CT IMAGING | Facility: HOSPITAL | Age: 62
End: 2022-05-18

## 2022-05-18 ENCOUNTER — HOSPITAL ENCOUNTER (EMERGENCY)
Facility: HOSPITAL | Age: 62
Discharge: HOME | End: 2022-05-18
Attending: EMERGENCY MEDICINE
Payer: COMMERCIAL

## 2022-05-18 VITALS
HEART RATE: 73 BPM | DIASTOLIC BLOOD PRESSURE: 95 MMHG | OXYGEN SATURATION: 100 % | RESPIRATION RATE: 16 BRPM | TEMPERATURE: 98.5 F | SYSTOLIC BLOOD PRESSURE: 199 MMHG

## 2022-05-18 DIAGNOSIS — B37.0 THRUSH: Primary | ICD-10-CM

## 2022-05-18 DIAGNOSIS — E87.6 HYPOKALEMIA: ICD-10-CM

## 2022-05-18 DIAGNOSIS — N39.0 UTI (URINARY TRACT INFECTION): ICD-10-CM

## 2022-05-18 DIAGNOSIS — B37.0 THRUSH, ORAL: ICD-10-CM

## 2022-05-18 LAB
2HR DELTA HS TROPONIN: 0 NG/L
ALBUMIN SERPL BCP-MCNC: 2.8 G/DL (ref 3.5–5)
ALP SERPL-CCNC: 98 U/L (ref 46–116)
ALT SERPL W P-5'-P-CCNC: 24 U/L (ref 12–78)
ANION GAP SERPL CALCULATED.3IONS-SCNC: 16 MMOL/L (ref 4–13)
AST SERPL W P-5'-P-CCNC: 36 U/L (ref 5–45)
ATRIAL RATE: 77 BPM
BACTERIA UR QL AUTO: ABNORMAL /HPF
BASOPHILS # BLD AUTO: 0.01 THOUSANDS/ΜL (ref 0–0.1)
BASOPHILS NFR BLD AUTO: 0 % (ref 0–1)
BILIRUB SERPL-MCNC: 0.72 MG/DL (ref 0.2–1)
BILIRUB UR QL STRIP: ABNORMAL
BUN SERPL-MCNC: 12 MG/DL (ref 5–25)
CALCIUM ALBUM COR SERPL-MCNC: 9.4 MG/DL (ref 8.3–10.1)
CALCIUM SERPL-MCNC: 8.4 MG/DL (ref 8.3–10.1)
CARDIAC TROPONIN I PNL SERPL HS: 6 NG/L
CARDIAC TROPONIN I PNL SERPL HS: 6 NG/L
CHLORIDE SERPL-SCNC: 96 MMOL/L (ref 100–108)
CLARITY UR: CLEAR
CO2 SERPL-SCNC: 23 MMOL/L (ref 21–32)
COLOR UR: ABNORMAL
CREAT SERPL-MCNC: 0.72 MG/DL (ref 0.6–1.3)
EOSINOPHIL # BLD AUTO: 0.01 THOUSAND/ΜL (ref 0–0.61)
EOSINOPHIL NFR BLD AUTO: 0 % (ref 0–6)
ERYTHROCYTE [DISTWIDTH] IN BLOOD BY AUTOMATED COUNT: 18 % (ref 11.6–15.1)
GFR SERPL CREATININE-BSD FRML MDRD: 91 ML/MIN/1.73SQ M
GLUCOSE SERPL-MCNC: 102 MG/DL (ref 65–140)
GLUCOSE SERPL-MCNC: 103 MG/DL (ref 65–140)
GLUCOSE UR STRIP-MCNC: NEGATIVE MG/DL
HCT VFR BLD AUTO: 29.8 % (ref 34.8–46.1)
HGB BLD-MCNC: 10.2 G/DL (ref 11.5–15.4)
HGB UR QL STRIP.AUTO: ABNORMAL
IMM GRANULOCYTES # BLD AUTO: 0.02 THOUSAND/UL (ref 0–0.2)
IMM GRANULOCYTES NFR BLD AUTO: 0 % (ref 0–2)
KETONES UR STRIP-MCNC: ABNORMAL MG/DL
LEUKOCYTE ESTERASE UR QL STRIP: ABNORMAL
LYMPHOCYTES # BLD AUTO: 1.19 THOUSANDS/ΜL (ref 0.6–4.47)
LYMPHOCYTES NFR BLD AUTO: 15 % (ref 14–44)
MAGNESIUM SERPL-MCNC: 1.5 MG/DL (ref 1.6–2.6)
MCH RBC QN AUTO: 35.2 PG (ref 26.8–34.3)
MCHC RBC AUTO-ENTMCNC: 34.2 G/DL (ref 31.4–37.4)
MCV RBC AUTO: 103 FL (ref 82–98)
MONOCYTES # BLD AUTO: 1.56 THOUSAND/ΜL (ref 0.17–1.22)
MONOCYTES NFR BLD AUTO: 19 % (ref 4–12)
MUCOUS THREADS UR QL AUTO: ABNORMAL
NEUTROPHILS # BLD AUTO: 5.26 THOUSANDS/ΜL (ref 1.85–7.62)
NEUTS SEG NFR BLD AUTO: 66 % (ref 43–75)
NITRITE UR QL STRIP: NEGATIVE
NON-SQ EPI CELLS URNS QL MICRO: ABNORMAL /HPF
NRBC BLD AUTO-RTO: 0 /100 WBCS
OTHER STN SPEC: ABNORMAL
P AXIS: 53 DEGREES
PH UR STRIP.AUTO: 6 [PH]
PLATELET # BLD AUTO: 294 THOUSANDS/UL (ref 149–390)
PMV BLD AUTO: 9.5 FL (ref 8.9–12.7)
POTASSIUM SERPL-SCNC: 3 MMOL/L (ref 3.5–5.3)
PR INTERVAL: 140 MS
PROT SERPL-MCNC: 6.3 G/DL (ref 6.4–8.2)
PROT UR STRIP-MCNC: ABNORMAL MG/DL
QRS AXIS: 3 DEGREES
QRSD INTERVAL: 142 MS
QT INTERVAL: 470 MS
QTC INTERVAL: 531 MS
RBC # BLD AUTO: 2.9 MILLION/UL (ref 3.81–5.12)
RBC #/AREA URNS AUTO: ABNORMAL /HPF
SODIUM SERPL-SCNC: 135 MMOL/L (ref 136–145)
SP GR UR STRIP.AUTO: >=1.03 (ref 1–1.03)
T WAVE AXIS: 107 DEGREES
UROBILINOGEN UR QL STRIP.AUTO: 1 E.U./DL
VENTRICULAR RATE: 77 BPM
WBC # BLD AUTO: 8.05 THOUSAND/UL (ref 4.31–10.16)
WBC #/AREA URNS AUTO: ABNORMAL /HPF

## 2022-05-18 PROCEDURE — 87086 URINE CULTURE/COLONY COUNT: CPT | Performed by: EMERGENCY MEDICINE

## 2022-05-18 PROCEDURE — 83735 ASSAY OF MAGNESIUM: CPT | Performed by: EMERGENCY MEDICINE

## 2022-05-18 PROCEDURE — 84484 ASSAY OF TROPONIN QUANT: CPT | Performed by: EMERGENCY MEDICINE

## 2022-05-18 PROCEDURE — G1004 CDSM NDSC: HCPCS

## 2022-05-18 PROCEDURE — 96365 THER/PROPH/DIAG IV INF INIT: CPT

## 2022-05-18 PROCEDURE — 80053 COMPREHEN METABOLIC PANEL: CPT | Performed by: EMERGENCY MEDICINE

## 2022-05-18 PROCEDURE — 99285 EMERGENCY DEPT VISIT HI MDM: CPT | Performed by: EMERGENCY MEDICINE

## 2022-05-18 PROCEDURE — 82948 REAGENT STRIP/BLOOD GLUCOSE: CPT

## 2022-05-18 PROCEDURE — 96366 THER/PROPH/DIAG IV INF ADDON: CPT

## 2022-05-18 PROCEDURE — 85025 COMPLETE CBC W/AUTO DIFF WBC: CPT | Performed by: EMERGENCY MEDICINE

## 2022-05-18 PROCEDURE — 36415 COLL VENOUS BLD VENIPUNCTURE: CPT | Performed by: EMERGENCY MEDICINE

## 2022-05-18 PROCEDURE — 99284 EMERGENCY DEPT VISIT MOD MDM: CPT

## 2022-05-18 PROCEDURE — 81001 URINALYSIS AUTO W/SCOPE: CPT | Performed by: EMERGENCY MEDICINE

## 2022-05-18 PROCEDURE — 93005 ELECTROCARDIOGRAM TRACING: CPT

## 2022-05-18 PROCEDURE — 70490 CT SOFT TISSUE NECK W/O DYE: CPT

## 2022-05-18 PROCEDURE — 93010 ELECTROCARDIOGRAM REPORT: CPT | Performed by: INTERNAL MEDICINE

## 2022-05-18 PROCEDURE — 96361 HYDRATE IV INFUSION ADD-ON: CPT

## 2022-05-18 RX ORDER — POTASSIUM CHLORIDE 750 MG/1
10 TABLET, EXTENDED RELEASE ORAL 2 TIMES DAILY
Qty: 20 TABLET | Refills: 0 | Status: SHIPPED | OUTPATIENT
Start: 2022-05-18 | End: 2022-05-25

## 2022-05-18 RX ORDER — POTASSIUM CHLORIDE 29.8 MG/ML
40 INJECTION INTRAVENOUS ONCE
Status: DISCONTINUED | OUTPATIENT
Start: 2022-05-18 | End: 2022-05-18 | Stop reason: SDUPTHER

## 2022-05-18 RX ORDER — POTASSIUM CHLORIDE 14.9 MG/ML
20 INJECTION INTRAVENOUS
Status: DISCONTINUED | OUTPATIENT
Start: 2022-05-18 | End: 2022-05-18 | Stop reason: HOSPADM

## 2022-05-18 RX ORDER — POTASSIUM CHLORIDE 20 MEQ/1
40 TABLET, EXTENDED RELEASE ORAL ONCE
Status: COMPLETED | OUTPATIENT
Start: 2022-05-18 | End: 2022-05-18

## 2022-05-18 RX ORDER — CEPHALEXIN 250 MG/1
500 CAPSULE ORAL ONCE
Status: COMPLETED | OUTPATIENT
Start: 2022-05-18 | End: 2022-05-18

## 2022-05-18 RX ORDER — FLUCONAZOLE 100 MG/1
100 TABLET ORAL DAILY
Qty: 7 TABLET | Refills: 0 | OUTPATIENT
Start: 2022-05-18 | End: 2022-05-19

## 2022-05-18 RX ORDER — MAGNESIUM SULFATE HEPTAHYDRATE 40 MG/ML
2 INJECTION, SOLUTION INTRAVENOUS ONCE
Status: COMPLETED | OUTPATIENT
Start: 2022-05-18 | End: 2022-05-18

## 2022-05-18 RX ORDER — CEPHALEXIN 500 MG/1
500 CAPSULE ORAL EVERY 12 HOURS SCHEDULED
Qty: 14 CAPSULE | Refills: 0 | Status: SHIPPED | OUTPATIENT
Start: 2022-05-18 | End: 2022-05-25

## 2022-05-18 RX ORDER — FLUCONAZOLE 100 MG/1
200 TABLET ORAL ONCE
Status: COMPLETED | OUTPATIENT
Start: 2022-05-18 | End: 2022-05-18

## 2022-05-18 RX ORDER — LIDOCAINE HYDROCHLORIDE 20 MG/ML
15 SOLUTION OROPHARYNGEAL ONCE
Status: COMPLETED | OUTPATIENT
Start: 2022-05-18 | End: 2022-05-18

## 2022-05-18 RX ADMIN — CEPHALEXIN 500 MG: 250 CAPSULE ORAL at 14:33

## 2022-05-18 RX ADMIN — POTASSIUM CHLORIDE 40 MEQ: 1500 TABLET, EXTENDED RELEASE ORAL at 12:48

## 2022-05-18 RX ADMIN — LIDOCAINE HYDROCHLORIDE 15 ML: 20 SOLUTION ORAL; TOPICAL at 11:35

## 2022-05-18 RX ADMIN — MAGNESIUM SULFATE HEPTAHYDRATE 2 G: 40 INJECTION, SOLUTION INTRAVENOUS at 12:55

## 2022-05-18 RX ADMIN — SODIUM CHLORIDE 1000 ML: 0.9 INJECTION, SOLUTION INTRAVENOUS at 11:33

## 2022-05-18 RX ADMIN — FLUCONAZOLE 200 MG: 100 TABLET ORAL at 12:48

## 2022-05-18 NOTE — ED PROVIDER NOTES
History  Chief Complaint   Patient presents with    Sore Throat - Complicated     Pt reports thrush for the past month  Hasn't been able to tolerate PO for two weeks  Currently being managed by GI  C/o fatigue and weakness  Patient is a 72-year-old female past medical history of hypertension, anxiety depression, gastric bypass, CAD, MI presenting with throat pain  Patient states that she has been undergoing treatment for thrush for the past month following discharge from the hospital    States that she has been doing swish and swallow medications but is not sure with the name of it is, with no improvement  She notes scratching pain which is constant worse with swallowing and nonradiating to the throat for the past month which is worse with swallowing  She states that she has not been able to eat or drink for the past 2 weeks and notes nausea but denies vomiting/diarrhea/constipation  Denies fevers, chest pain, shortness of breath, rashes, vision changes  Denies dysphagia  She notes increased fatigue in the same timeframe  Prior to Admission Medications   Prescriptions Last Dose Informant Patient Reported? Taking?    DULoxetine (CYMBALTA) 20 mg capsule   No No   Sig: Take 1 capsule (20 mg total) by mouth daily   busPIRone (BUSPAR) 10 mg tablet   No No   Sig: Take 1 tablet (10 mg total) by mouth 2 (two) times a day   Patient taking differently: Take 10 mg by mouth in the morning     diphenhydrAMINE (Benadryl Allergy) 25 mg capsule  Self Yes No   Sig: Take 25 mg by mouth every 6 (six) hours as needed for itching     estradiol (ESTRACE) 0 1 mg/g vaginal cream   No No   Sig: Insert 1 g into the vagina 3 (three) times a week Apply a pea sized amount to external vaginal opening and urethra area twice weekly   fluticasone (FLONASE) 50 mcg/act nasal spray   No No   Si sprays into each nostril daily   folic acid (FOLVITE) 1 mg tablet   No No   Sig: Take 1 tablet (1 mg total) by mouth daily   metoprolol succinate (TOPROL-XL) 50 mg 24 hr tablet   No No   Sig: Take 1 tablet (50 mg total) by mouth daily   pantoprazole (PROTONIX) 40 mg tablet   No No   Sig: Take 1 tablet (40 mg total) by mouth daily   polyethylene glycol (MiraLax) 17 GM/SCOOP powder   No No   Sig: Take 238 g by mouth once for 1 dose Take 238 g my mouth  Take first half at 5pm, Take second half at 6am      Facility-Administered Medications: None       Past Medical History:   Diagnosis Date    Acute cystitis without hematuria     Acute kidney injury ([facility] ) 2022    hospital admission    Anxiety     Bradycardia     Depression     Gastroenteritis     Heart attack ([facility] )     History of blood transfusion     History of depression     Hypertension     Macrocytic anemia 2022    hospital admission    Past heart attack        Past Surgical History:   Procedure Laterality Date    BREAST CYST EXCISION Right 1999  benign    BREAST SURGERY Right     calcifications      SECTION  ,     CHOLECYSTECTOMY      GASTRIC BYPASS      OOPHORECTOMY Right        Family History   Problem Relation Age of Onset    Diabetes Mother     Heart attack Father     Diabetes Sister     Substance Abuse Brother     Diabetes Brother     No Known Problems Maternal Grandmother     Breast cancer Paternal Grandmother     No Known Problems Sister     No Known Problems Sister     No Known Problems Maternal Aunt     No Known Problems Maternal Aunt     No Known Problems Maternal Aunt     No Known Problems Paternal Aunt     No Known Problems Paternal Aunt     No Known Problems Paternal Aunt      I have reviewed and agree with the history as documented      E-Cigarette/Vaping    E-Cigarette Use Never User      E-Cigarette/Vaping Substances    Nicotine No     THC No     CBD No     Flavoring No     Other No     Unknown No      Social History     Tobacco Use    Smoking status: Never Smoker    Smokeless tobacco: Never Used Vaping Use    Vaping Use: Never used   Substance Use Topics    Alcohol use: Yes     Alcohol/week: 3 0 standard drinks     Types: 3 Shots of liquor per week    Drug use: Never       Review of Systems   All other systems reviewed and are negative  Physical Exam  Physical Exam  Vitals reviewed  Constitutional:       General: She is not in acute distress  Appearance: Normal appearance  She is not ill-appearing  HENT:      Mouth/Throat:      Mouth: Mucous membranes are moist       Pharynx: Oropharyngeal exudate present  Comments: White patches the posterior oropharynx  Eyes:      Conjunctiva/sclera: Conjunctivae normal    Cardiovascular:      Rate and Rhythm: Normal rate and regular rhythm  Heart sounds: Normal heart sounds  Pulmonary:      Effort: Pulmonary effort is normal       Breath sounds: Normal breath sounds  Abdominal:      General: Abdomen is flat  Palpations: Abdomen is soft  Tenderness: There is no abdominal tenderness  Musculoskeletal:         General: No swelling  Normal range of motion  Cervical back: Neck supple  Skin:     General: Skin is warm and dry  Neurological:      General: No focal deficit present  Mental Status: She is alert     Psychiatric:         Mood and Affect: Mood normal          Vital Signs  ED Triage Vitals [05/18/22 1021]   Temperature Pulse Respirations Blood Pressure SpO2   98 5 °F (36 9 °C) 85 16 (!) 184/95 100 %      Temp Source Heart Rate Source Patient Position - Orthostatic VS BP Location FiO2 (%)   Oral Monitor Sitting Left arm --      Pain Score       10 - Worst Possible Pain           Vitals:    05/18/22 1021 05/18/22 1248   BP: (!) 184/95 (!) 199/95   Pulse: 85 73   Patient Position - Orthostatic VS: Sitting          Visual Acuity      ED Medications  Medications   sodium chloride 0 9 % bolus 1,000 mL (0 mL Intravenous Stopped 5/18/22 1435)   Lidocaine Viscous HCl (XYLOCAINE) 2 % mucosal solution 15 mL (15 mL Swish & Swallow Given 5/18/22 1135)   magnesium sulfate 2 g/50 mL IVPB (premix) 2 g (0 g Intravenous Stopped 5/18/22 1435)   potassium chloride (K-DUR,KLOR-CON) CR tablet 40 mEq (40 mEq Oral Given 5/18/22 1248)   fluconazole (DIFLUCAN) tablet 200 mg (200 mg Oral Given 5/18/22 1248)   cephalexin (KEFLEX) capsule 500 mg (500 mg Oral Given 5/18/22 1433)       Diagnostic Studies  Results Reviewed     Procedure Component Value Units Date/Time    Urine culture [[id number]] Collected: 05/18/22 1303    Lab Status: Final result Specimen: Urine, Clean Catch Updated: 05/19/22 2158     Urine Culture >100,000 cfu/ml     Urine Microscopic [[id number]]  (Abnormal) Collected: 05/18/22 1303    Lab Status: Final result Specimen: Urine, Clean Catch Updated: 05/18/22 1347     RBC, UA 1-2 /hpf      WBC, UA 20-30 /hpf      Epithelial Cells Occasional /hpf      Bacteria, UA Occasional /hpf      OTHER OBSERVATIONS WBCs Clumped     MUCUS THREADS Occasional    HS Troponin I 2hr [[id number]]  (Normal) Collected: 05/18/22 1307    Lab Status: Final result Specimen: Blood from Arm, Right Updated: 05/18/22 1344     hs TnI 2hr 6 ng/L      Delta 2hr hsTnI 0 ng/L     UA w Reflex to Microscopic w Reflex to Culture [[id number]]  (Abnormal) Collected: 05/18/22 1303    Lab Status: Final result Specimen: Urine, Clean Catch Updated: 05/18/22 1326     Color, UA Sophia     Clarity, UA Clear     Specific Gravity, UA >=1 030     pH, UA 6 0     Leukocytes, UA Moderate     Nitrite, UA Negative     Protein, UA 30 (1+) mg/dl      Glucose, UA Negative mg/dl      Ketones, UA 40 (2+) mg/dl      Urobilinogen, UA 1 0 E U /dl      Bilirubin, UA Large     Blood, UA Trace-Intact    HS Troponin 0hr (reflex protocol) [[id number]]  (Normal) Collected: 05/18/22 1133    Lab Status: Final result Specimen: Blood from Arm, Right Updated: 05/18/22 1207     hs TnI 0hr 6 ng/L     Comprehensive metabolic panel [[id number]]  (Abnormal) Collected: 05/18/22 1133    Lab Status: Final result Specimen: Blood from Arm, Right Updated: 05/18/22 1204     Sodium 135 mmol/L      Potassium 3 0 mmol/L      Chloride 96 mmol/L      CO2 23 mmol/L      ANION GAP 16 mmol/L      BUN 12 mg/dL      Creatinine 0 72 mg/dL      Glucose 102 mg/dL      Calcium 8 4 mg/dL      Corrected Calcium 9 4 mg/dL      AST 36 U/L      ALT 24 U/L      Alkaline Phosphatase 98 U/L      Total Protein 6 3 g/dL      Albumin 2 8 g/dL      Total Bilirubin 0 72 mg/dL      eGFR 91 ml/min/1 73sq m     Narrative:      National Kidney Disease Foundation guidelines for Chronic Kidney Disease (CKD):     Stage 1 with normal or high GFR (GFR > 90 mL/min/1 73 square meters)    Stage 2 Mild CKD (GFR = 60-89 mL/min/1 73 square meters)    Stage 3A Moderate CKD (GFR = 45-59 mL/min/1 73 square meters)    Stage 3B Moderate CKD (GFR = 30-44 mL/min/1 73 square meters)    Stage 4 Severe CKD (GFR = 15-29 mL/min/1 73 square meters)    Stage 5 End Stage CKD (GFR <15 mL/min/1 73 square meters)  Note: GFR calculation is accurate only with a steady state creatinine    Magnesium [[id number]]  (Abnormal) Collected: 05/18/22 1133    Lab Status: Final result Specimen: Blood from Arm, Right Updated: 05/18/22 1204     Magnesium 1 5 mg/dL     CBC and differential [[id number]]  (Abnormal) Collected: 05/18/22 1133    Lab Status: Final result Specimen: Blood from Arm, Right Updated: 05/18/22 1140     WBC 8 05 Thousand/uL      RBC 2 90 Million/uL      Hemoglobin 10 2 g/dL      Hematocrit 29 8 %       fL      MCH 35 2 pg      MCHC 34 2 g/dL      RDW 18 0 %      MPV 9 5 fL      Platelets 731 Thousands/uL      nRBC 0 /100 WBCs      Neutrophils Relative 66 %      Immat GRANS % 0 %      Lymphocytes Relative 15 %      Monocytes Relative 19 %      Eosinophils Relative 0 %      Basophils Relative 0 %      Neutrophils Absolute 5 26 Thousands/µL      Immature Grans Absolute 0 02 Thousand/uL      Lymphocytes Absolute 1 19 Thousands/µL      Monocytes Absolute 1 56 Thousand/µL Eosinophils Absolute 0 01 Thousand/µL      Basophils Absolute 0 01 Thousands/µL     Fingerstick Glucose (POCT) [[id number]]  (Normal) Collected: 05/18/22 1128    Lab Status: Final result Updated: 05/18/22 1129     POC Glucose 103 mg/dl                  CT soft tissue neck wo contrast   Final Result by Darrius Palomo DO (05/18 1144)      Normal CT of the neck  No pathologic adenopathy or soft tissue mass  Workstation performed: [id number]                    Procedures  ECG 12 Lead Documentation Only    Date/Time: 5/18/2022 11:36 AM  Performed by: Michael Nassar DO  Authorized by: Michael Nassar DO     ECG reviewed by me, the ED Provider: yes    Patient location:  ED  Previous ECG:     Previous ECG:  Compared to current    Comparison ECG info:  Previous inferior T-wave inversions now upright  Interpretation:     Interpretation: non-specific    Rate:     ECG rate assessment: normal    Rhythm:     Rhythm: sinus rhythm    Ectopy:     Ectopy: aberrant    QRS:     QRS axis:  Left    QRS intervals: Wide  Conduction:     Conduction: abnormal      Abnormal conduction: complete LBBB    ST segments:     ST segments:  Normal  T waves:     T waves: inverted      Inverted:  AVL             ED Course  ED Course as of 05/20/22 1552   Wed May 18, 2022   1350 Delta troponin unremarkable, patient notes dysuria has white cells in urine, will treat with Keflex for UTI in addition to fluconazole for thrush  1415 Patient currently hypertensive but asymptomatic, have advised PCP follow-up and discussed return precautions which she states she understands  SBIRT 22yo+    Flowsheet Row Most Recent Value   SBIRT (25 yo +)    In order to provide better care to our patients, we are screening all of our patients for alcohol and drug use  Would it be okay to ask you these screening questions?  Unable to answer at this time Filed at: 05/18/2022 1037                    [facility]  Number of Diagnoses or Management Options  Hypokalemia  Thrush, oral  Thrush  UTI (urinary tract infection)  Diagnosis management comments: Patient is a 12year-old female past medical history of hypertension, CAD, mi, anxiety depression, gastric bypass presenting with likely Candida esophagitis  Patient is well-appearing bedside with stable vitals and in no acute distress  She does appear to have thrush in the posterior oropharynx but no other significant physical exam findings  Will obtain CT to assess for sequelae of esophagitis, give nystatin swish and swallow here, pain control, obtain labs to assess for BALJINDER given decreased p o  Intake, and reassess  Disposition  Final diagnoses: Thrush   UTI (urinary tract infection)   Hypokalemia   Thrush, oral     Time reflects when diagnosis was documented in both MDM as applicable and the Disposition within this note     Time User Action Codes Description Comment    5/18/2022  2:05 PM Mini Fernández Add [B37 0] Thrush     5/18/2022  2:05 PM Mikolosky, Beryle Pitcairn Add [N39 0] UTI (urinary tract infection)     5/18/2022  2:06 PM Mini Fernández Add [E87 6] Hypokalemia     5/18/2022  2:20 PM Ana Newton Add [B37 0] Thrush, oral       ED Disposition     ED Disposition   Discharge    Condition   Stable    Date/Time   Wed May 18, 2022  2:05 PM    Naveen Martinez 35 discharge to home/self care                 Follow-up Information     Follow up With Specialties Details [facility] [address] [facility]  Internal Medicine, Nurse Practitioner In 1 week  [address]   [phone]            Discharge Medication List as of 5/18/2022  2:21 PM      START taking these medications    Details   cephalexin (KEFLEX) 500 mg capsule Take 1 capsule (500 mg total) by mouth every 12 (twelve) hours for 7 days, Starting Wed 5/18/2022, Until Wed 5/25/2022, Normal      potassium chloride (K-DUR,KLOR-CON) 10 mEq tablet Take 1 tablet (10 mEq total) by mouth in the morning and 1 tablet (10 mEq total) in the evening  Do all this for 7 days  , Starting Wed 5/18/2022, Until Wed 5/25/2022, Normal      fluconazole (DIFLUCAN) 100 mg tablet Take 1 tablet (100 mg total) by mouth in the morning for 7 days  , Starting Wed 5/18/2022, Until Wed 5/25/2022, Normal         CONTINUE these medications which have NOT CHANGED    Details   busPIRone (BUSPAR) 10 mg tablet Take 1 tablet (10 mg total) by mouth 2 (two) times a day, Starting Fri 11/19/2021, Normal      diphenhydrAMINE (Benadryl Allergy) 25 mg capsule Take 25 mg by mouth every 6 (six) hours as needed for itching  , Historical Med      DULoxetine (CYMBALTA) 20 mg capsule Take 1 capsule (20 mg total) by mouth daily, Starting Fri 4/8/2022, Normal      estradiol (ESTRACE) 0 1 mg/g vaginal cream Insert 1 g into the vagina 3 (three) times a week Apply a pea sized amount to external vaginal opening and urethra area twice weekly, Starting Fri 10/1/2021, Until Sun 10/31/2021, Normal      fluticasone (FLONASE) 50 mcg/act nasal spray 2 sprays into each nostril daily, Starting Wed 9/9/7677, Normal      folic acid (FOLVITE) 1 mg tablet Take 1 tablet (1 mg total) by mouth daily, Starting Wed 4/6/2022, Until Fri 5/6/2022, Normal      metoprolol succinate (TOPROL-XL) 50 mg 24 hr tablet Take 1 tablet (50 mg total) by mouth daily, Starting Fri 4/8/2022, Until Sun 5/8/2022, Normal      pantoprazole (PROTONIX) 40 mg tablet Take 1 tablet (40 mg total) by mouth daily, Starting Tue 4/5/2022, Until Thu 5/5/2022, Normal      polyethylene glycol (MiraLax) 17 GM/SCOOP powder Take 238 g by mouth once for 1 dose Take 238 g my mouth  Take first half at 5pm, Take second half at [place], Starting Sun 4/3/2022, Normal             No discharge procedures on file      PDMP Review     None          ED Provider  Electronically Signed by           Silvia Lerner DO  05/20/22 9558

## 2022-05-19 ENCOUNTER — HOSPITAL ENCOUNTER (EMERGENCY)
Facility: HOSPITAL | Age: 62
Discharge: HOME | End: 2022-05-19
Attending: EMERGENCY MEDICINE | Admitting: EMERGENCY MEDICINE
Payer: COMMERCIAL

## 2022-05-19 VITALS
TEMPERATURE: 99.4 F | DIASTOLIC BLOOD PRESSURE: 112 MMHG | OXYGEN SATURATION: 100 % | SYSTOLIC BLOOD PRESSURE: 184 MMHG | RESPIRATION RATE: 18 BRPM | HEART RATE: 101 BPM

## 2022-05-19 DIAGNOSIS — B37.0 THRUSH, ORAL: Primary | ICD-10-CM

## 2022-05-19 LAB — BACTERIA UR CULT: NORMAL

## 2022-05-19 PROCEDURE — 99283 EMERGENCY DEPT VISIT LOW MDM: CPT

## 2022-05-19 PROCEDURE — 99284 EMERGENCY DEPT VISIT MOD MDM: CPT | Performed by: EMERGENCY MEDICINE

## 2022-05-19 RX ORDER — FLUCONAZOLE 40 MG/ML
200 POWDER, FOR SUSPENSION ORAL DAILY
Qty: 70 ML | Refills: 0 | Status: SHIPPED | OUTPATIENT
Start: 2022-05-19 | End: 2022-06-02

## 2022-05-20 NOTE — DISCHARGE INSTRUCTIONS
Drink fluids through a straw gently over the next day  I have change the Diflucan dose to a liquid and you should take 1 tsp a day for 2 weeks  For increased discomfort use Magic mouthwash 2 tsp every 6 hours as needed for pain  Swish and swallow  Do not take Diflucan tablets, use Diflucan liquid instead  Return to the ER with any new, concerning, worsening issues  I have contacted the GI doctor who states that they should be able to get you in in 2 weeks  I have contacted the scheduling people to try to get you in earlier

## 2022-05-20 NOTE — ED PROVIDER NOTES
History  Chief Complaint   Patient presents with    Sore Throat     Pt reports she feels like there are two lumps to the back of her throat x2 weeks that have started to become painful today     82-year-old female presents emergency room complaining of difficulty swallowing  Patient notes that she was seen and diagnosed about a month ago and was placed on nystatin  Patient notes that she got better over the the interim  Patient notes her symptoms came back and today was having difficulty eating holding any water down  Patient denies any fever chills or history of being immunocompromised  Patient is here because of increasing pain with swallowing and difficulty  Patient has an appointment with GI but has not seen them yet, and notes that she is supposed to see them in 1 month  Prior to Admission Medications   Prescriptions Last Dose Informant Patient Reported? Taking? DULoxetine (CYMBALTA) 20 mg capsule   No No   Sig: Take 1 capsule (20 mg total) by mouth daily   busPIRone (BUSPAR) 10 mg tablet   No No   Sig: Take 1 tablet (10 mg total) by mouth 2 (two) times a day   Patient taking differently: Take 10 mg by mouth in the morning     cephalexin (KEFLEX) 500 mg capsule   No No   Sig: Take 1 capsule (500 mg total) by mouth every 12 (twelve) hours for 7 days   diphenhydrAMINE (Benadryl Allergy) 25 mg capsule  Self Yes No   Sig: Take 25 mg by mouth every 6 (six) hours as needed for itching     estradiol (ESTRACE) 0 1 mg/g vaginal cream   No No   Sig: Insert 1 g into the vagina 3 (three) times a week Apply a pea sized amount to external vaginal opening and urethra area twice weekly   fluconazole (DIFLUCAN) 100 mg tablet   No No   Sig: Take 1 tablet (100 mg total) by mouth in the morning for 7 days     fluticasone (FLONASE) 50 mcg/act nasal spray   No No   Si sprays into each nostril daily   folic acid (FOLVITE) 1 mg tablet   No No   Sig: Take 1 tablet (1 mg total) by mouth daily   metoprolol succinate (TOPROL-XL) 50 mg 24 hr tablet   No No   Sig: Take 1 tablet (50 mg total) by mouth daily   pantoprazole (PROTONIX) 40 mg tablet   No No   Sig: Take 1 tablet (40 mg total) by mouth daily   polyethylene glycol (MiraLax) 17 GM/SCOOP powder   No No   Sig: Take 238 g by mouth once for 1 dose Take 238 g my mouth  Take first half at 5pm, Take second half at 6am   potassium chloride (K-DUR,KLOR-CON) 10 mEq tablet   No No   Sig: Take 1 tablet (10 mEq total) by mouth in the morning and 1 tablet (10 mEq total) in the evening  Do all this for 7 days  Facility-Administered Medications: None       Past Medical History:   Diagnosis Date    Acute cystitis without hematuria     [facility] kidney injury ([facility] ) 2022    hospital admission    Anxiety     Bradycardia     Depression     Gastroenteritis     Heart attack ([facility] )     History of blood transfusion     History of depression     Hypertension     Macrocytic anemia 2022    hospital admission    Past heart attack        Past Surgical History:   Procedure Laterality Date    BREAST CYST EXCISION Right 1999  benign    BREAST SURGERY Right     calcifications      SECTION  ,     CHOLECYSTECTOMY      GASTRIC BYPASS      OOPHORECTOMY Right        Family History   Problem Relation Age of Onset    Diabetes Mother     Heart attack Father     Diabetes Sister     Substance Abuse Brother     Diabetes Brother     No Known Problems Maternal Grandmother     Breast cancer Paternal Grandmother     No Known Problems Sister     No Known Problems Sister     No Known Problems Maternal Aunt     No Known Problems Maternal Aunt     No Known Problems Maternal Aunt     No Known Problems Paternal Aunt     No Known Problems Paternal Aunt     No Known Problems Paternal Aunt      I have reviewed and agree with the history as documented      E-Cigarette/Vaping    E-Cigarette Use Never User      E-Cigarette/Vaping Substances    Nicotine No  THC No     CBD No     Flavoring No     Other No     Unknown No      Social History     Tobacco Use    Smoking status: Never Smoker    Smokeless tobacco: Never Used   Vaping Use    Vaping Use: Never used   Substance Use Topics    Alcohol use: Yes     Alcohol/week: 3 0 standard drinks     Types: 3 Shots of liquor per week    Drug use: Never       Review of Systems   Constitutional: Negative for chills and fever  HENT: Positive for sore throat  Negative for ear pain  Respiratory: Negative for cough and shortness of breath  Cardiovascular: Negative for chest pain and palpitations  Gastrointestinal: Negative for abdominal pain  Musculoskeletal: Negative for arthralgias and back pain  Skin: Negative for color change and rash  Neurological: Negative for seizures and syncope  All other systems reviewed and are negative  Physical Exam  Physical Exam  Vitals and nursing note reviewed  Constitutional:       General: She is not in acute distress  Appearance: Normal appearance  She is well-developed  HENT:      Head: Normocephalic and atraumatic  Right Ear: External ear normal       Left Ear: External ear normal       Nose: Nose normal       Mouth/Throat:      Mouth: Mucous membranes are moist  Oral lesions present  Pharynx: Oropharyngeal exudate present  Comments: Patient has evidence of patchy erythematous and pale regions in the oropharynx with some mild edema  No definitive abscess formation is noted  The mucosa looks raw and is most likely due to a fungal mucosa and possibly fungal esophagitis due to the patient's history of chief complaint  Eyes:      Conjunctiva/sclera: Conjunctivae normal    Cardiovascular:      Rate and Rhythm: Regular rhythm  Tachycardia present  Pulses: Normal pulses  Heart sounds: Normal heart sounds  No murmur heard  Comments: Heart rate 100  Pulmonary:      Effort: Pulmonary effort is normal  No respiratory distress  Breath sounds: Normal breath sounds  Abdominal:      General: Bowel sounds are normal       Palpations: Abdomen is soft  Musculoskeletal:         General: No swelling or deformity  Cervical back: Neck supple  Skin:     General: Skin is warm and dry  Capillary Refill: Capillary refill takes less than 2 seconds  Neurological:      General: No focal deficit present  Mental Status: She is alert and oriented to person, place, and time  Mental status is at baseline  Vital Signs  ED Triage Vitals [05/19/22 2101]   Temperature Pulse Respirations Blood Pressure SpO2   99 4 °F (37 4 °C) 101 18 (!) 184/112 100 %      Temp Source Heart Rate Source Patient Position - Orthostatic VS BP Location FiO2 (%)   Tympanic -- -- Right arm --      Pain Score       9           Vitals:    05/19/22 2101   BP: (!) 184/112   Pulse: 101         Visual Acuity      ED Medications  Medications - No data to display    Diagnostic Studies  Results Reviewed     None                 No orders to display              Procedures  Procedures         ED Course  ED Course as of 05/19/22 2235   Thu May 19, 2022   2140 Patient was able to drink water at the bedside with some discomfort  2158 Upon further evaluation, of the medical record, the patient was in [facility]'s ER yesterday the patient did not tell me any of this information  Patient notes that she was worked up and placed on multiple medications, none of which she has taken yet  2158 Discussed case with GI who notes: "Nystatin won't treat candida esophagitis so would try fluconazole 200 mg daily for 14 days  We should be able to get her in to clinic somewhere within the network within 1-2 weeks"   2235 Blood pressure is elevated may be due to pain  The patient was urged to take a another dose of her antihypertensive medicine  Patient notes that they just recently decreased her meds    Patient should check her blood pressure after taking medicine for pain to see if a dose adjustment is needed  MDM    Disposition  Final diagnoses: Jose Old, oral     Time reflects when diagnosis was documented in both MDM as applicable and the Disposition within this note     Time User Action Codes Description Comment    5/19/2022 10:05 PM Miguel Ángel Figueroa Add [B37 0] Jose Ford, oral       ED Disposition     ED Disposition   Discharge    Condition   Stable    Date/Time   Thu May 19, 2022 10:02 PM    Naveen Martinez 35 discharge to home/self care  Follow-up Information    None         Discharge Medication List as of 5/19/2022 10:07 PM      START taking these medications    Details   al mag oxide-diphenhydramine-lidocaine viscous (MAGIC MOUTHWASH) 1:1:1 suspension Swish and swallow 10 mL every 6 (six) hours as needed for mouth pain or discomfort for up to 5 days, Starting u 5/19/2022, Until Tue 5/24/2022 at 2359, Normal      fluconazole (DIFLUCAN) 40 mg/mL suspension Take 5 mL (200 mg total) by mouth in the morning for 14 days  , Starting u 5/19/2022, Until u 6/2/2022, Normal         CONTINUE these medications which have NOT CHANGED    Details   busPIRone (BUSPAR) 10 mg tablet Take 1 tablet (10 mg total) by mouth 2 (two) times a day, Starting Fri 11/19/2021, Normal      cephalexin (KEFLEX) 500 mg capsule Take 1 capsule (500 mg total) by mouth every 12 (twelve) hours for 7 days, Starting Wed 5/18/2022, Until Wed 5/25/2022, Normal      diphenhydrAMINE (Benadryl Allergy) 25 mg capsule Take 25 mg by mouth every 6 (six) hours as needed for itching  , Historical Med      DULoxetine (CYMBALTA) 20 mg capsule Take 1 capsule (20 mg total) by mouth daily, Starting Fri 4/8/2022, Normal      estradiol (ESTRACE) 0 1 mg/g vaginal cream Insert 1 g into the vagina 3 (three) times a week Apply a pea sized amount to external vaginal opening and urethra area twice weekly, Starting Fri 10/1/2021, Until Sun 10/31/2021, Normal      fluticasone (FLONASE) 50 mcg/act nasal spray 2 sprays into each nostril daily, Starting Wed 8/4/2262, Normal      folic acid (FOLVITE) 1 mg tablet Take 1 tablet (1 mg total) by mouth daily, Starting Wed 4/6/2022, Until Fri 5/6/2022, Normal      metoprolol succinate (TOPROL-XL) 50 mg 24 hr tablet Take 1 tablet (50 mg total) by mouth daily, Starting Fri 4/8/2022, Until Sun 5/8/2022, Normal      pantoprazole (PROTONIX) 40 mg tablet Take 1 tablet (40 mg total) by mouth daily, Starting Tue 4/5/2022, Until Thu 5/5/2022, Normal      polyethylene glycol (MiraLax) 17 GM/SCOOP powder Take 238 g by mouth once for 1 dose Take 238 g my mouth  Take first half at 5pm, Take second half at [place], Starting Sun 4/3/2022, Normal      potassium chloride (K-DUR,KLOR-CON) 10 mEq tablet Take 1 tablet (10 mEq total) by mouth in the morning and 1 tablet (10 mEq total) in the evening  Do all this for 7 days  , Starting Wed 5/18/2022, Until Wed 5/25/2022, Normal         STOP taking these medications       fluconazole (DIFLUCAN) 100 mg tablet Comments:   Reason for Stopping:                   PDMP Review     None          ED Provider  Electronically Signed by           Josafat Batista DO  05/19/22 6254

## 2022-05-23 ENCOUNTER — HOSPITAL ENCOUNTER (EMERGENCY)
Facility: HOSPITAL | Age: 62
Discharge: HOME | End: 2022-05-23
Attending: EMERGENCY MEDICINE | Admitting: EMERGENCY MEDICINE

## 2022-05-23 ENCOUNTER — APPOINTMENT (EMERGENCY)
Dept: CT IMAGING | Facility: HOSPITAL | Age: 62
End: 2022-05-23

## 2022-05-23 ENCOUNTER — APPOINTMENT (EMERGENCY)
Dept: RADIOLOGY | Facility: HOSPITAL | Age: 62
End: 2022-05-23

## 2022-05-23 VITALS
SYSTOLIC BLOOD PRESSURE: 190 MMHG | RESPIRATION RATE: 15 BRPM | DIASTOLIC BLOOD PRESSURE: 90 MMHG | HEART RATE: 51 BPM | TEMPERATURE: 99.7 F | OXYGEN SATURATION: 99 %

## 2022-05-23 DIAGNOSIS — S90.31XA CONTUSION OF RIGHT FOOT: ICD-10-CM

## 2022-05-23 DIAGNOSIS — S16.1XXA STRAIN OF NECK MUSCLE, INITIAL ENCOUNTER: ICD-10-CM
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
## 2022-05-23 DIAGNOSIS — S09.90XA MINOR HEAD INJURY: ICD-10-CM

## 2022-05-23 DIAGNOSIS — S80.01XA CONTUSION OF RIGHT KNEE: ICD-10-CM

## 2022-05-23 PROCEDURE — 73590 X-RAY EXAM OF LOWER LEG: CPT

## 2022-05-23 PROCEDURE — G1004 CDSM NDSC: HCPCS

## 2022-05-23 PROCEDURE — 73610 X-RAY EXAM OF ANKLE: CPT

## 2022-05-23 PROCEDURE — 70450 CT HEAD/BRAIN W/O DYE: CPT

## 2022-05-23 PROCEDURE — 73630 X-RAY EXAM OF FOOT: CPT

## 2022-05-23 PROCEDURE — 99284 EMERGENCY DEPT VISIT MOD MDM: CPT | Performed by: EMERGENCY MEDICINE

## 2022-05-23 PROCEDURE — 99284 EMERGENCY DEPT VISIT MOD MDM: CPT

## 2022-05-23 PROCEDURE — 72125 CT NECK SPINE W/O DYE: CPT

## 2022-05-23 PROCEDURE — 73564 X-RAY EXAM KNEE 4 OR MORE: CPT

## 2022-05-23 RX ORDER — IBUPROFEN 400 MG/1
400 TABLET ORAL ONCE
Status: COMPLETED | OUTPATIENT
Start: 2022-05-23 | End: 2022-05-23

## 2022-05-23 RX ORDER — ACETAMINOPHEN 325 MG/1
650 TABLET ORAL ONCE
Status: COMPLETED | OUTPATIENT
Start: 2022-05-23 | End: 2022-05-23

## 2022-05-23 RX ADMIN — IBUPROFEN 400 MG: 400 TABLET, FILM COATED ORAL at 18:41

## 2022-05-23 RX ADMIN — ACETAMINOPHEN 650 MG: 325 TABLET, FILM COATED ORAL at 16:48
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
## 2022-05-31 ENCOUNTER — HOSPITAL ENCOUNTER (INPATIENT)
Facility: HOSPITAL | Age: 62
LOS: 2 days | Discharge: HOME | DRG: 082 | End: 2022-06-02
Attending: FAMILY MEDICINE | Admitting: STUDENT IN AN ORGANIZED HEALTH CARE EDUCATION/TRAINING PROGRAM
Payer: COMMERCIAL

## 2022-05-31 ENCOUNTER — APPOINTMENT (EMERGENCY)
Dept: CT IMAGING | Facility: HOSPITAL | Age: 62
DRG: 082 | End: 2022-05-31
Payer: COMMERCIAL

## 2022-05-31 ENCOUNTER — APPOINTMENT (EMERGENCY)
Dept: RADIOLOGY | Facility: HOSPITAL | Age: 62
DRG: 082 | End: 2022-05-31
Payer: COMMERCIAL

## 2022-05-31 DIAGNOSIS — R41.82 ALTERED MENTAL STATUS: Primary | ICD-10-CM

## 2022-05-31 DIAGNOSIS — R44.3 HALLUCINATIONS: ICD-10-CM

## 2022-05-31 DIAGNOSIS — E83.42 HYPOMAGNESEMIA: ICD-10-CM

## 2022-05-31 DIAGNOSIS — I10 PRIMARY HYPERTENSION: ICD-10-CM

## 2022-05-31 DIAGNOSIS — E87.6 HYPOKALEMIA: ICD-10-CM

## 2022-05-31 PROBLEM — E87.8 ELECTROLYTE ABNORMALITY: Status: ACTIVE | Noted: 2022-05-31

## 2022-05-31 PROBLEM — R63.8 DECREASED ORAL INTAKE: Status: ACTIVE | Noted: 2022-05-31

## 2022-05-31 LAB
2HR DELTA HS TROPONIN: 2 NG/L
4HR DELTA HS TROPONIN: 1 NG/L
ALBUMIN SERPL BCP-MCNC: 2.9 G/DL (ref 3.5–5)
ALP SERPL-CCNC: 110 U/L (ref 34–104)
ALT SERPL W P-5'-P-CCNC: 16 U/L (ref 7–52)
AMMONIA PLAS-SCNC: 25 UMOL/L (ref 18–72)
AMPHETAMINES SERPL QL SCN: NEGATIVE
ANION GAP SERPL CALCULATED.3IONS-SCNC: 16 MMOL/L (ref 4–13)
ANION GAP SERPL CALCULATED.3IONS-SCNC: 17 MMOL/L (ref 4–13)
APAP SERPL-MCNC: <10 UG/ML (ref 10–20)
APTT PPP: 40 SECONDS (ref 23–37)
AST SERPL W P-5'-P-CCNC: 21 U/L (ref 13–39)
ATRIAL RATE: 107 BPM
BACTERIA UR QL AUTO: ABNORMAL /HPF
BARBITURATES UR QL: NEGATIVE
BASOPHILS # BLD AUTO: 0.01 THOUSANDS/ΜL (ref 0–0.1)
BASOPHILS NFR BLD AUTO: 0 % (ref 0–1)
BENZODIAZ UR QL: NEGATIVE
BILIRUB SERPL-MCNC: 0.88 MG/DL (ref 0.2–1)
BILIRUB UR QL STRIP: ABNORMAL
BUN SERPL-MCNC: 13 MG/DL (ref 5–25)
BUN SERPL-MCNC: 13 MG/DL (ref 5–25)
CALCIUM ALBUM COR SERPL-MCNC: 9.5 MG/DL (ref 8.3–10.1)
CALCIUM SERPL-MCNC: 8.6 MG/DL (ref 8.4–10.2)
CALCIUM SERPL-MCNC: 8.7 MG/DL (ref 8.4–10.2)
CARDIAC TROPONIN I PNL SERPL HS: 15 NG/L
CARDIAC TROPONIN I PNL SERPL HS: 16 NG/L
CARDIAC TROPONIN I PNL SERPL HS: 17 NG/L
CHLORIDE SERPL-SCNC: 96 MMOL/L (ref 96–108)
CHLORIDE SERPL-SCNC: 98 MMOL/L (ref 96–108)
CLARITY UR: ABNORMAL
CO2 SERPL-SCNC: 21 MMOL/L (ref 21–32)
CO2 SERPL-SCNC: 23 MMOL/L (ref 21–32)
COCAINE UR QL: NEGATIVE
COLOR UR: YELLOW
CREAT SERPL-MCNC: 0.74 MG/DL (ref 0.6–1.3)
CREAT SERPL-MCNC: 0.75 MG/DL (ref 0.6–1.3)
EOSINOPHIL # BLD AUTO: 0 THOUSAND/ΜL (ref 0–0.61)
EOSINOPHIL NFR BLD AUTO: 0 % (ref 0–6)
ERYTHROCYTE [DISTWIDTH] IN BLOOD BY AUTOMATED COUNT: 16.7 % (ref 11.6–15.1)
ETHANOL SERPL-MCNC: <10 MG/DL
FLUAV RNA RESP QL NAA+PROBE: NEGATIVE
FLUBV RNA RESP QL NAA+PROBE: NEGATIVE
GFR SERPL CREATININE-BSD FRML MDRD: 86 ML/MIN/1.73SQ M
GFR SERPL CREATININE-BSD FRML MDRD: 88 ML/MIN/1.73SQ M
GLUCOSE SERPL-MCNC: 105 MG/DL (ref 65–140)
GLUCOSE SERPL-MCNC: 117 MG/DL (ref 65–140)
GLUCOSE UR STRIP-MCNC: NEGATIVE MG/DL
HCT VFR BLD AUTO: 26.7 % (ref 34.8–46.1)
HGB BLD-MCNC: 8.7 G/DL (ref 11.5–15.4)
HGB UR QL STRIP.AUTO: ABNORMAL
IMM GRANULOCYTES # BLD AUTO: 0.04 THOUSAND/UL (ref 0–0.2)
IMM GRANULOCYTES NFR BLD AUTO: 0 % (ref 0–2)
INR PPP: 1.06 (ref 0.84–1.19)
KETONES UR STRIP-MCNC: ABNORMAL MG/DL
LEUKOCYTE ESTERASE UR QL STRIP: ABNORMAL
LYMPHOCYTES # BLD AUTO: 0.93 THOUSANDS/ΜL (ref 0.6–4.47)
LYMPHOCYTES NFR BLD AUTO: 9 % (ref 14–44)
MAGNESIUM SERPL-MCNC: 1.6 MG/DL (ref 1.9–2.7)
MAGNESIUM SERPL-MCNC: 2.2 MG/DL (ref 1.9–2.7)
MCH RBC QN AUTO: 34.9 PG (ref 26.8–34.3)
MCHC RBC AUTO-ENTMCNC: 32.6 G/DL (ref 31.4–37.4)
MCV RBC AUTO: 107 FL (ref 82–98)
METHADONE UR QL: NEGATIVE
MONOCYTES # BLD AUTO: 1.06 THOUSAND/ΜL (ref 0.17–1.22)
MONOCYTES NFR BLD AUTO: 10 % (ref 4–12)
NEUTROPHILS # BLD AUTO: 8.74 THOUSANDS/ΜL (ref 1.85–7.62)
NEUTS SEG NFR BLD AUTO: 81 % (ref 43–75)
NITRITE UR QL STRIP: NEGATIVE
NON-SQ EPI CELLS URNS QL MICRO: ABNORMAL /HPF
NRBC BLD AUTO-RTO: 0 /100 WBCS
OPIATES UR QL SCN: NEGATIVE
OXYCODONE+OXYMORPHONE UR QL SCN: NEGATIVE
PCP UR QL: NEGATIVE
PH UR STRIP.AUTO: 6 [PH]
PHOSPHATE SERPL-MCNC: 3.2 MG/DL (ref 2.3–4.1)
PLATELET # BLD AUTO: 319 THOUSANDS/UL (ref 149–390)
PMV BLD AUTO: 9.2 FL (ref 8.9–12.7)
POTASSIUM SERPL-SCNC: 2.9 MMOL/L (ref 3.5–5.3)
POTASSIUM SERPL-SCNC: 2.9 MMOL/L (ref 3.5–5.3)
PR INTERVAL: 192 MS
PROCALCITONIN SERPL-MCNC: 0.24 NG/ML
PROT SERPL-MCNC: 5.5 G/DL (ref 6.4–8.4)
PROT UR STRIP-MCNC: ABNORMAL MG/DL
PROTHROMBIN TIME: 13.7 SECONDS (ref 11.6–14.5)
QRS AXIS: -32 DEGREES
QRSD INTERVAL: 148 MS
QT INTERVAL: 394 MS
QTC INTERVAL: 525 MS
RBC # BLD AUTO: 2.49 MILLION/UL (ref 3.81–5.12)
RBC #/AREA URNS AUTO: ABNORMAL /HPF
RSV RNA RESP QL NAA+PROBE: NEGATIVE
SALICYLATES SERPL-MCNC: <5 MG/DL (ref 3–20)
SARS-COV-2 RNA RESP QL NAA+PROBE: NEGATIVE
SODIUM SERPL-SCNC: 135 MMOL/L (ref 135–147)
SODIUM SERPL-SCNC: 136 MMOL/L (ref 135–147)
SP GR UR STRIP.AUTO: 1.02 (ref 1–1.03)
T WAVE AXIS: 111 DEGREES
THC UR QL: NEGATIVE
TSH SERPL DL<=0.05 MIU/L-ACNC: 2.14 UIU/ML (ref 0.45–4.5)
TSH SERPL DL<=0.05 MIU/L-ACNC: 2.38 UIU/ML (ref 0.45–4.5)
UROBILINOGEN UR QL STRIP.AUTO: 0.2 E.U./DL
VENTRICULAR RATE: 107 BPM
WBC # BLD AUTO: 10.78 THOUSAND/UL (ref 4.31–10.16)
WBC #/AREA URNS AUTO: ABNORMAL /HPF

## 2022-05-31 PROCEDURE — 99285 EMERGENCY DEPT VISIT HI MDM: CPT | Performed by: PHYSICIAN ASSISTANT

## 2022-05-31 PROCEDURE — 93005 ELECTROCARDIOGRAM TRACING: CPT

## 2022-05-31 PROCEDURE — 85610 PROTHROMBIN TIME: CPT | Performed by: PHYSICIAN ASSISTANT

## 2022-05-31 PROCEDURE — 70450 CT HEAD/BRAIN W/O DYE: CPT

## 2022-05-31 PROCEDURE — 82746 ASSAY OF FOLIC ACID SERUM: CPT | Performed by: STUDENT IN AN ORGANIZED HEALTH CARE EDUCATION/TRAINING PROGRAM

## 2022-05-31 PROCEDURE — 84100 ASSAY OF PHOSPHORUS: CPT | Performed by: PHYSICIAN ASSISTANT

## 2022-05-31 PROCEDURE — 99222 1ST HOSP IP/OBS MODERATE 55: CPT | Performed by: STUDENT IN AN ORGANIZED HEALTH CARE EDUCATION/TRAINING PROGRAM

## 2022-05-31 PROCEDURE — 87086 URINE CULTURE/COLONY COUNT: CPT | Performed by: PHYSICIAN ASSISTANT

## 2022-05-31 PROCEDURE — 83735 ASSAY OF MAGNESIUM: CPT | Performed by: STUDENT IN AN ORGANIZED HEALTH CARE EDUCATION/TRAINING PROGRAM

## 2022-05-31 PROCEDURE — 87389 HIV-1 AG W/HIV-1&-2 AB AG IA: CPT | Performed by: PHYSICIAN ASSISTANT

## 2022-05-31 PROCEDURE — 0241U HB NFCT DS VIR RESP RNA 4 TRGT: CPT | Performed by: PHYSICIAN ASSISTANT

## 2022-05-31 PROCEDURE — 84484 ASSAY OF TROPONIN QUANT: CPT | Performed by: PHYSICIAN ASSISTANT

## 2022-05-31 PROCEDURE — 71045 X-RAY EXAM CHEST 1 VIEW: CPT

## 2022-05-31 PROCEDURE — 83735 ASSAY OF MAGNESIUM: CPT | Performed by: PHYSICIAN ASSISTANT

## 2022-05-31 PROCEDURE — 96374 THER/PROPH/DIAG INJ IV PUSH: CPT

## 2022-05-31 PROCEDURE — 96361 HYDRATE IV INFUSION ADD-ON: CPT

## 2022-05-31 PROCEDURE — 80048 BASIC METABOLIC PNL TOTAL CA: CPT | Performed by: STUDENT IN AN ORGANIZED HEALTH CARE EDUCATION/TRAINING PROGRAM

## 2022-05-31 PROCEDURE — 84443 ASSAY THYROID STIM HORMONE: CPT | Performed by: PHYSICIAN ASSISTANT

## 2022-05-31 PROCEDURE — 99285 EMERGENCY DEPT VISIT HI MDM: CPT

## 2022-05-31 PROCEDURE — 85025 COMPLETE CBC W/AUTO DIFF WBC: CPT | Performed by: PHYSICIAN ASSISTANT

## 2022-05-31 PROCEDURE — 84443 ASSAY THYROID STIM HORMONE: CPT | Performed by: STUDENT IN AN ORGANIZED HEALTH CARE EDUCATION/TRAINING PROGRAM

## 2022-05-31 PROCEDURE — 80053 COMPREHEN METABOLIC PANEL: CPT | Performed by: PHYSICIAN ASSISTANT

## 2022-05-31 PROCEDURE — 80179 DRUG ASSAY SALICYLATE: CPT | Performed by: PHYSICIAN ASSISTANT

## 2022-05-31 PROCEDURE — 85730 THROMBOPLASTIN TIME PARTIAL: CPT | Performed by: PHYSICIAN ASSISTANT

## 2022-05-31 PROCEDURE — 81001 URINALYSIS AUTO W/SCOPE: CPT | Performed by: PHYSICIAN ASSISTANT

## 2022-05-31 PROCEDURE — 82607 VITAMIN B-12: CPT | Performed by: STUDENT IN AN ORGANIZED HEALTH CARE EDUCATION/TRAINING PROGRAM

## 2022-05-31 PROCEDURE — 82140 ASSAY OF AMMONIA: CPT | Performed by: PHYSICIAN ASSISTANT

## 2022-05-31 PROCEDURE — 93010 ELECTROCARDIOGRAM REPORT: CPT | Performed by: INTERNAL MEDICINE

## 2022-05-31 PROCEDURE — 80307 DRUG TEST PRSMV CHEM ANLYZR: CPT | Performed by: PHYSICIAN ASSISTANT

## 2022-05-31 PROCEDURE — 36415 COLL VENOUS BLD VENIPUNCTURE: CPT | Performed by: PHYSICIAN ASSISTANT

## 2022-05-31 PROCEDURE — 84145 PROCALCITONIN (PCT): CPT | Performed by: STUDENT IN AN ORGANIZED HEALTH CARE EDUCATION/TRAINING PROGRAM

## 2022-05-31 PROCEDURE — 82077 ASSAY SPEC XCP UR&BREATH IA: CPT | Performed by: PHYSICIAN ASSISTANT

## 2022-05-31 PROCEDURE — 80143 DRUG ASSAY ACETAMINOPHEN: CPT | Performed by: PHYSICIAN ASSISTANT

## 2022-05-31 RX ORDER — POTASSIUM CHLORIDE 20 MEQ/1
40 TABLET, EXTENDED RELEASE ORAL ONCE
Status: COMPLETED | OUTPATIENT
Start: 2022-05-31 | End: 2022-05-31

## 2022-05-31 RX ORDER — CEFTRIAXONE 1 G/50ML
1000 INJECTION, SOLUTION INTRAVENOUS EVERY 24 HOURS
Status: DISCONTINUED | OUTPATIENT
Start: 2022-05-31 | End: 2022-06-02 | Stop reason: HOSPADM

## 2022-05-31 RX ORDER — FLUCONAZOLE 100 MG/1
200 TABLET ORAL DAILY
Status: COMPLETED | OUTPATIENT
Start: 2022-06-01 | End: 2022-06-02

## 2022-05-31 RX ORDER — METOPROLOL SUCCINATE 50 MG/1
50 TABLET, EXTENDED RELEASE ORAL DAILY
Status: DISCONTINUED | OUTPATIENT
Start: 2022-06-01 | End: 2022-06-02 | Stop reason: HOSPADM

## 2022-05-31 RX ORDER — MAGNESIUM SULFATE HEPTAHYDRATE 40 MG/ML
2 INJECTION, SOLUTION INTRAVENOUS ONCE
Status: COMPLETED | OUTPATIENT
Start: 2022-05-31 | End: 2022-05-31

## 2022-05-31 RX ORDER — HEPARIN SODIUM 5000 [USP'U]/ML
5000 INJECTION, SOLUTION INTRAVENOUS; SUBCUTANEOUS EVERY 8 HOURS SCHEDULED
Status: DISCONTINUED | OUTPATIENT
Start: 2022-05-31 | End: 2022-06-02 | Stop reason: HOSPADM

## 2022-05-31 RX ORDER — FOLIC ACID 1 MG/1
1 TABLET ORAL DAILY
Status: DISCONTINUED | OUTPATIENT
Start: 2022-06-01 | End: 2022-06-02 | Stop reason: HOSPADM

## 2022-05-31 RX ORDER — BUSPIRONE HYDROCHLORIDE 5 MG/1
10 TABLET ORAL DAILY
Status: DISCONTINUED | OUTPATIENT
Start: 2022-05-31 | End: 2022-06-02 | Stop reason: HOSPADM

## 2022-05-31 RX ORDER — DULOXETIN HYDROCHLORIDE 20 MG/1
20 CAPSULE, DELAYED RELEASE ORAL DAILY
Status: DISCONTINUED | OUTPATIENT
Start: 2022-06-01 | End: 2022-06-02 | Stop reason: HOSPADM

## 2022-05-31 RX ADMIN — CEFTRIAXONE 1000 MG: 1 INJECTION, SOLUTION INTRAVENOUS at 17:08

## 2022-05-31 RX ADMIN — HEPARIN SODIUM 5000 UNITS: 5000 INJECTION INTRAVENOUS; SUBCUTANEOUS at 22:04

## 2022-05-31 RX ADMIN — MAGNESIUM SULFATE HEPTAHYDRATE 2 G: 40 INJECTION, SOLUTION INTRAVENOUS at 14:43

## 2022-05-31 RX ADMIN — BUSPIRONE HYDROCHLORIDE 10 MG: 5 TABLET ORAL at 19:28

## 2022-05-31 RX ADMIN — SODIUM CHLORIDE 500 ML: 0.9 INJECTION, SOLUTION INTRAVENOUS at 12:23

## 2022-05-31 RX ADMIN — POTASSIUM CHLORIDE 40 MEQ: 1500 TABLET, EXTENDED RELEASE ORAL at 14:44

## 2022-05-31 NOTE — ASSESSMENT & PLAN NOTE
Patient presenting with hypokalemia and hypomagnesemia, likely secondary to poor oral intake    -monitor and replete as necessary

## 2022-05-31 NOTE — PLAN OF CARE
Problem: Nutrition/Hydration-ADULT  Goal: Nutrient/Hydration intake appropriate for improving, restoring or maintaining nutritional needs  Description: Monitor and assess patient's nutrition/hydration status for malnutrition  Collaborate with interdisciplinary team and initiate plan and interventions as ordered  Monitor patient's weight and dietary intake as ordered or per policy  Utilize nutrition screening tool and intervene as necessary  Determine patient's food preferences and provide high-protein, high-caloric foods as appropriate       INTERVENTIONS:  - Monitor oral intake, urinary output, labs, and treatment plans  - Assess nutrition and hydration status and recommend course of action  - Evaluate amount of meals eaten  - Assist patient with eating if necessary   - Allow adequate time for meals  - Recommend/ encourage appropriate diets, oral nutritional supplements, and vitamin/mineral supplements  - Order, calculate, and assess calorie counts as needed  - Recommend, monitor, and adjust tube feedings and TPN/PPN based on assessed needs  - Assess need for intravenous fluids  - Provide specific nutrition/hydration education as appropriate  - Include patient/family/caregiver in decisions related to nutrition  Outcome: Progressing     Problem: Potential for Falls  Goal: Patient will remain free of falls  Description: INTERVENTIONS:  - Educate patient/family on patient safety including physical limitations  - Instruct patient to call for assistance with activity   - Consult OT/PT to assist with strengthening/mobility   - Keep Call bell within reach  - Keep bed low and locked with side rails adjusted as appropriate  - Keep care items and personal belongings within reach  - Initiate and maintain comfort rounds  - Make Fall Risk Sign visible to staff  - Offer Toileting every 2 Hours, in advance of need  - Initiate/Maintain bed alarm  - Obtain necessary fall risk management equipment: bed alarm  - Apply yellow socks and bracelet for high fall risk patients  - Consider moving patient to room near nurses station  Outcome: Progressing     Problem: PAIN - ADULT  Goal: Verbalizes/displays adequate comfort level or baseline comfort level  Description: Interventions:  - Encourage patient to monitor pain and request assistance  - Assess pain using appropriate pain scale  - Administer analgesics based on type and severity of pain and evaluate response  - Implement non-pharmacological measures as appropriate and evaluate response  - Consider cultural and social influences on pain and pain management  - Notify physician/advanced practitioner if interventions unsuccessful or patient reports new pain  Outcome: Progressing     Problem: INFECTION - ADULT  Goal: Absence or prevention of progression during hospitalization  Description: INTERVENTIONS:  - Assess and monitor for signs and symptoms of infection  - Monitor lab/diagnostic results  - Monitor all insertion sites, i e  indwelling lines, tubes, and drains  - Monitor endotracheal if appropriate and nasal secretions for changes in amount and color  - [place] appropriate cooling/warming therapies per order  - Administer medications as ordered  - Instruct and encourage patient and family to use good hand hygiene technique  - Identify and instruct in appropriate isolation precautions for identified infection/condition  Outcome: Progressing  Goal: Absence of fever/infection during neutropenic period  Description: INTERVENTIONS:  - Monitor WBC    Outcome: Progressing     Problem: SAFETY ADULT  Goal: Patient will remain free of falls  Description: INTERVENTIONS:  - Educate patient/family on patient safety including physical limitations  - Instruct patient to call for assistance with activity   - Consult OT/PT to assist with strengthening/mobility   - Keep Call bell within reach  - Keep bed low and locked with side rails adjusted as appropriate  - Keep care items and personal belongings within reach  - Initiate and maintain comfort rounds  - Make Fall Risk Sign visible to staff  - Offer Toileting every 2 Hours, in advance of need  - Initiate/Maintain bed alarm  - Obtain necessary fall risk management equipment: bed alarm  - Apply yellow socks and bracelet for high fall risk patients  - Consider moving patient to room near nurses station  Outcome: Progressing  Goal: Maintain or return to baseline ADL function  Description: INTERVENTIONS:  -  Assess patient's ability to carry out ADLs; assess patient's baseline for ADL function and identify physical deficits which impact ability to perform ADLs (bathing, care of mouth/teeth, toileting, grooming, dressing, etc )  - Assess/evaluate cause of self-care deficits   - Assess range of motion  - Assess patient's mobility; develop plan if impaired  - Assess patient's need for assistive devices and provide as appropriate  - Encourage maximum independence but intervene and supervise when necessary  - Involve family in performance of ADLs  - Assess for home care needs following discharge   - Consider OT consult to assist with ADL evaluation and planning for discharge  - Provide patient education as appropriate  Outcome: Progressing  Goal: Maintains/Returns to pre admission functional level  Description: INTERVENTIONS:  - Perform BMAT or MOVE assessment daily    - Set and communicate daily mobility goal to care team and patient/family/caregiver  - Collaborate with rehabilitation services on mobility goals if consulted  - Perform Range of Motion 3 times a day  - Reposition patient every 2 hours    - Dangle patient 3 times a day  - Stand patient 3 times a day  - Ambulate patient 3 times a day  - Out of bed to chair 3 times a day   - Out of bed for meals 3 times a day  - Out of bed for toileting  - Record patient progress and toleration of activity level   Outcome: Progressing     Problem: DISCHARGE PLANNING  Goal: Discharge to home or other facility with appropriate resources  Description: INTERVENTIONS:  - Identify barriers to discharge w/patient and caregiver  - Arrange for needed discharge resources and transportation as appropriate  - Identify discharge learning needs (meds, wound care, etc )  - Arrange for interpretive services to assist at discharge as needed  - Refer to Case Management Department for coordinating discharge planning if the patient needs post-hospital services based on physician/advanced practitioner order or complex needs related to functional status, cognitive ability, or social support system  Outcome: Progressing     Problem: Knowledge Deficit  Goal: Patient/family/caregiver demonstrates understanding of disease process, treatment plan, medications, and discharge instructions  Description: Complete learning assessment and assess knowledge base    Interventions:  - Provide teaching at level of understanding  - Provide teaching via preferred learning methods  Outcome: Progressing

## 2022-05-31 NOTE — H&P
[address] [place] [address] 1960, 63 y o  female MRN: [id number]  Unit/Bed#: ED 28 Encounter: [id number]  Primary Care Provider: LETTY Londono   Date and time admitted to hospital: 5/31/2022 11:18 AM    * Hallucinations  Assessment & Plan  Patient was brought to ED evaluation of hallucinations  At baseline mental status in ED  CT head negative for any acute intracranial processes  Was recently treated with Keflex for UTI after ED visit on 5/18  Mild leukocytosis noted on admission    -check UA  -will treat empirically with Rocephin   -continue home BuSpar and Cymbalta  -psychiatry consult  -procalcitonin  -check TSH, folate, B12    Decreased oral intake  Assessment & Plan  Secondary to recent thrush infection likely contributing to electrolyte abnormalities  -speech evaluation in a m   -continue treatment for thrush  -family is concerned there may be a psychiatric component as well    Electrolyte abnormality  Assessment & Plan  Patient presenting with hypokalemia and hypomagnesemia, likely secondary to poor oral intake    -monitor and replete as necessary    Thrush, oral  Assessment & Plan  Improved  -continue remainder of patient Diflucan course    Anxiety and depression  Assessment & Plan  -continue home BuSpar and Cymbalta    Essential hypertension  Assessment & Plan  -continue home metoprolol    VTE Pharmacologic Prophylaxis: VTE Score: 3 Moderate Risk (Score 3-4) - Pharmacological DVT Prophylaxis Ordered: heparin  Code Status:  Level 1 full code  Discussion with family:  Discussed with patient's     Anticipated Length of Stay: Patient will be admitted on an inpatient basis with an anticipated length of stay of greater than 2 midnights secondary to Hallucinations requiring psychiatric evaluation      Total Time for Visit, including Counseling / Coordination of Care: 30 minutes Greater than 50% of this total time spent on direct patient counseling and coordination of care  Chief Complaint:  Hallucination    History of Present Illness:  Hillary Guy is a 63 y o  female with a PMH of anxiety, depression, GERD, hypertension who presents with hallucinations  Patient was brought into ED by family for evaluation of hallucinations  Family reports he has been primarily happening at night and have gotten increasingly worse over last several days  Also reports poor oral intake since diagnosed with thrush  In discussing with patient she adamantly denies any of the symptoms  States she feels fine  Family's concerned potential underlying psychiatric condition  Review of Systems:  Review of Systems   Constitutional: Negative  HENT: Negative  Respiratory: Negative  Cardiovascular: Negative  Gastrointestinal: Negative  Genitourinary: Negative  Musculoskeletal: Negative  Skin: Negative  Neurological: Negative  Hematological: Negative  Psychiatric/Behavioral: Positive for hallucinations  Past Medical and Surgical History:   Past Medical History:   Diagnosis Date    Acute cystitis without hematuria     Acute kidney injury ([facility] [address] ) 2022    hospital admission    Anxiety     Bradycardia     Depression     Gastroenteritis     [facility]     History of blood transfusion     History of depression     Hypertension     Macrocytic anemia 2022    hospital admission    Past heart attack        Past Surgical History:   Procedure Laterality Date    BREAST CYST EXCISION Right   benign    BREAST SURGERY Right     calcifications      [facility]      GASTRIC BYPASS      OOPHORECTOMY Right        Meds/Allergies:  Prior to Admission medications    Medication Sig Start Date End Date Taking?  Authorizing Provider   busPIRone (BUSPAR) 10 mg tablet Take 1 tablet (10 mg total) by mouth 2 (two) times a day  Patient taking differently: Take 10 mg by mouth in the morning   11/19/21   LETTY Sutton   diphenhydrAMINE (Benadryl Allergy) 25 mg capsule Take 25 mg by mouth every 6 (six) hours as needed for itching      Historical Provider, MD   DULoxetine (CYMBALTA) 20 mg capsule Take 1 capsule (20 mg total) by mouth daily 4/8/22   LETTY Sutton   estradiol (ESTRACE) 0 1 mg/g vaginal cream Insert 1 g into the vagina 3 (three) times a week Apply a pea sized amount to external vaginal opening and urethra area twice weekly 10/1/21 10/31/21  LETTY Montez   fluconazole (DIFLUCAN) 40 mg/mL suspension Take 5 mL (200 mg total) by mouth in the morning for 14 days  5/19/22 6/2/22  Ashland Corn ,    fluticasone Roach Caller) 50 mcg/act nasal spray 2 sprays into each nostril daily 4/6/22   LETTY Stephens   folic acid (FOLVITE) 1 mg tablet Take 1 tablet (1 mg total) by mouth daily 4/6/22 5/6/22  LETTY Stephens   metoprolol succinate (TOPROL-XL) 50 mg 24 hr tablet Take 1 tablet (50 mg total) by mouth daily 4/8/22 5/8/22  LETTY Sutton   pantoprazole (PROTONIX) 40 mg tablet Take 1 tablet (40 mg total) by mouth daily 4/5/22 5/5/22  LETTY Stephens   polyethylene glycol (MiraLax) 17 GM/SCOOP powder Take 238 g by mouth once for 1 dose Take 238 g my mouth  Take first half at 5pm, Take second half at [facility] 4/3/22 4/3/22  Jonas Diggs,    potassium chloride (K-DUR,KLOR-CON) 10 mEq tablet Take 1 tablet (10 mEq total) by mouth in the morning and 1 tablet (10 mEq total) in the evening  Do all this for 7 days  5/18/22 5/25/22  Ana Newton, DO     I have reviewed home medications with patient family member  Allergies:    Allergies   Allergen Reactions    Oxycodone-Acetaminophen Hives     per t-system      Propoxyphene Hives    Shellfish Allergy - Food Allergy        Social History:  Marital Status: /Civil Union   Patient Pre-hospital Living Situation: Home  Patient Pre-hospital Level of Mobility: carlos  Patient Pre-hospital Diet Restrictions: none  Substance Use History:   Social History     Substance and Sexual Activity   Alcohol Use Yes    Alcohol/week: 3 0 standard drinks    Types: 3 Shots of liquor per week     Social History     Tobacco Use   Smoking Status Never Smoker   Smokeless Tobacco Never Used     Social History     Substance and Sexual Activity   Drug Use Never       Family History:  Family History   Problem Relation Age of Onset    Diabetes Mother     Heart attack Father     Diabetes Sister     Substance Abuse Brother     Diabetes Brother     No Known Problems Maternal Grandmother     Breast cancer Paternal Grandmother     No Known Problems Sister     No Known Problems Sister     No Known Problems Maternal Aunt     No Known Problems Maternal Aunt     No Known Problems Maternal Aunt     No Known Problems Paternal Aunt     No Known Problems Paternal Aunt     No Known Problems Paternal Aunt        Physical Exam:     Vitals:   Blood Pressure: 158/75 (05/31/22 1606)  Pulse: 104 (05/31/22 1606)  Temperature: 99 7 °F (37 6 °C) (05/31/22 1114)  Temp Source: Tympanic (05/31/22 1114)  Respirations: 12 (05/31/22 1606)  Height: 5' (152 4 cm) (05/31/22 1114)  Weight - Scale: 66 2 kg (146 lb) (05/31/22 1114)  SpO2: 98 % (05/31/22 1606)    Physical Exam  Cardiovascular:      Rate and Rhythm: Normal rate and regular rhythm  Pulses: Normal pulses  Heart sounds: Normal heart sounds  Pulmonary:      Effort: Pulmonary effort is normal       Breath sounds: Normal breath sounds  Abdominal:      General: Abdomen is flat  Bowel sounds are normal       Palpations: Abdomen is soft  Neurological:      General: No focal deficit present  Mental Status: Mental status is at baseline  Cranial Nerves: No cranial nerve deficit  Sensory: No sensory deficit  Motor: No weakness        Gait: Gait normal    Psychiatric:         Mood and Affect: Mood normal          Thought Content: Thought content normal          Judgment: Judgment normal           Additional Data:     Lab Results:  Results from last 7 days   Lab Units 05/31/22  1159   WBC Thousand/uL 10 78*   HEMOGLOBIN g/dL 8 7*   HEMATOCRIT % 26 7*   PLATELETS Thousands/uL 319   NEUTROS PCT % 81*   LYMPHS PCT % 9*   MONOS PCT % 10   EOS PCT % 0     Results from last 7 days   Lab Units 05/31/22  1159   SODIUM mmol/L 136   POTASSIUM mmol/L 2 9*   CHLORIDE mmol/L 96   CO2 mmol/L 23   BUN mg/dL 13   CREATININE mg/dL 0 74   ANION GAP mmol/L 17*   CALCIUM mg/dL 8 6   ALBUMIN g/dL 2 9*   TOTAL BILIRUBIN mg/dL 0 88   ALK PHOS U/L 110*   ALT U/L 16   AST U/L 21   GLUCOSE RANDOM mg/dL 117     Results from last 7 days   Lab Units 05/31/22  1159   INR  1 06                   Imaging: Reviewed radiology reports from this admission including: chest xray and CT head  XR chest 1 view   Final Result by Yasmine Conley MD (05/31 1530)      No acute cardiopulmonary disease  Workstation performed: [id number]         CT head without contrast   Final Result by Chandrakant Calderón MD (05/31 1235)      No acute intracranial abnormality  Workstation performed: [id number]             EKG and Other Studies Reviewed on Admission:   · EKG: No EKG obtained  ** Please Note: This note has been constructed using a voice recognition system   **

## 2022-05-31 NOTE — ASSESSMENT & PLAN NOTE
Patient was brought to ED evaluation of hallucinations  At baseline mental status in ED  CT head negative for any acute intracranial processes  Was recently treated with Keflex for UTI after ED visit on 5/18  Mild leukocytosis noted on admission    -check UA  -will treat empirically with Rocephin   -continue home BuSpar and Cymbalta  -psychiatry consult  -procalcitonin  -check TSH, folate, B12

## 2022-05-31 NOTE — ASSESSMENT & PLAN NOTE
Secondary to recent thrush infection likely contributing to electrolyte abnormalities  -speech evaluation in a m   -continue treatment for thrush  -family is concerned there may be a psychiatric component as well

## 2022-05-31 NOTE — ED PROVIDER NOTES
History  Chief Complaint   Patient presents with    Altered Mental Status     Altered mental status  Not eating since discharge  Per son she is seeing people that aren't there and saying things that don't make sense  Son believes this is nutrition related  82-year-old female history of recent hospital admission for hyponatremia presents brought in by her son with altered mental status  Patient's son provides majority of the history stating that patient has not been eating has had approximately 30 lb weight loss since she was discharged on April 5, 2022  He states that she still is drinking some Gatorade and Pedialyte but having almost no food intake stating that she just does not want to eat  Patient was recently diagnosed with thrush which is now resolved  Patient is not a diabetic, does not use inhalers and does not take any immunosuppressive medications and does not have any concern for be an immunocompromised or having HIV  She does note that she believes she has a vaginal yeast infection at this time as well  She denies any recent fevers, chills, chest pain, shortness of breath cough pleuritic pain, vomiting, abdominal pain, diarrhea, melena hematochezia, rashes or any other complaints or concerns at this time  She denies SI/HI  Patient's son also notes that he believes patient is having hallucinations  He states that she states that she has seen relatives that live out of state in the house when they are not there  Prior to Admission Medications   Prescriptions Last Dose Informant Patient Reported? Taking?    DULoxetine (CYMBALTA) 20 mg capsule   No No   Sig: Take 1 capsule (20 mg total) by mouth daily   busPIRone (BUSPAR) 10 mg tablet   No No   Sig: Take 1 tablet (10 mg total) by mouth 2 (two) times a day   Patient taking differently: Take 10 mg by mouth in the morning     diphenhydrAMINE (Benadryl Allergy) 25 mg capsule  Self Yes No   Sig: Take 25 mg by mouth every 6 (six) hours as needed for itching     estradiol (ESTRACE) 0 1 mg/g vaginal cream   No No   Sig: Insert 1 g into the vagina 3 (three) times a week Apply a pea sized amount to external vaginal opening and urethra area twice weekly   fluconazole (DIFLUCAN) 40 mg/mL suspension   No No   Sig: Take 5 mL (200 mg total) by mouth in the morning for 14 days  fluticasone (FLONASE) 50 mcg/act nasal spray   No No   Si sprays into each nostril daily   folic acid (FOLVITE) 1 mg tablet   No No   Sig: Take 1 tablet (1 mg total) by mouth daily   metoprolol succinate (TOPROL-XL) 50 mg 24 hr tablet   No No   Sig: Take 1 tablet (50 mg total) by mouth daily   pantoprazole (PROTONIX) 40 mg tablet   No No   Sig: Take 1 tablet (40 mg total) by mouth daily   polyethylene glycol (MiraLax) 17 GM/SCOOP powder   No No   Sig: Take 238 g by mouth once for 1 dose Take 238 g my mouth  Take first half at 5pm, Take second half at 6am   potassium chloride (K-DUR,KLOR-CON) 10 mEq tablet   No No   Sig: Take 1 tablet (10 mEq total) by mouth in the morning and 1 tablet (10 mEq total) in the evening  Do all this for 7 days        Facility-Administered Medications: None       Past Medical History:   Diagnosis Date    Acute cystitis without hematuria     Acute kidney injury ([facility] [address] ) 2022    hospital admission    Anxiety     Bradycardia     Depression     Gastroenteritis     Heart attack ([facility] [address] )     History of blood transfusion     History of depression     Hypertension     Macrocytic anemia 2022    hospital admission    Past heart attack        Past Surgical History:   Procedure Laterality Date    BREAST CYST EXCISION Right   benign    BREAST SURGERY Right     calcifications      SECTION  ,     CHOLECYSTECTOMY      GASTRIC BYPASS      OOPHORECTOMY Right        Family History   Problem Relation Age of Onset    Diabetes Mother     Heart attack Father     Diabetes Sister     Substance Abuse Brother     Diabetes Brother     No Known Problems Maternal Grandmother     Breast cancer Paternal Grandmother     No Known Problems Sister     No Known Problems Sister     No Known Problems Maternal Aunt     No Known Problems Maternal Aunt     No Known Problems Maternal Aunt     No Known Problems Paternal Aunt     No Known Problems Paternal Aunt     No Known Problems Paternal Aunt      I have reviewed and agree with the history as documented  E-Cigarette/Vaping    E-Cigarette Use Never User      E-Cigarette/Vaping Substances    Nicotine No     THC No     CBD No     Flavoring No     Other No     Unknown No      Social History     Tobacco Use    Smoking status: Never Smoker    Smokeless tobacco: Never Used   Vaping Use    Vaping Use: Never used   Substance Use Topics    Alcohol use: Yes     Alcohol/week: 3 0 standard drinks     Types: 3 Shots of liquor per week    Drug use: Never       Review of Systems   Constitutional: Positive for activity change and appetite change  Negative for chills, fatigue and fever  HENT: Negative for ear pain and sore throat  Eyes: Negative for pain  Respiratory: Negative for cough, shortness of breath and wheezing  Cardiovascular: Negative for chest pain, palpitations and leg swelling  Gastrointestinal: Negative for abdominal pain, constipation, diarrhea, nausea and vomiting  Endocrine: Negative for polyuria  Genitourinary: Negative for dysuria and pelvic pain  Musculoskeletal: Negative for arthralgias, myalgias, neck pain and neck stiffness  Skin: Negative for rash  Neurological: Negative for dizziness, syncope, light-headedness and headaches  All other systems reviewed and are negative  Physical Exam  Physical Exam  Constitutional:       General: She is not in acute distress  Appearance: Normal appearance  She is well-developed  HENT:      Head: Normocephalic and atraumatic        Right Ear: External ear normal       Left Ear: External ear normal       Mouth/Throat:      Pharynx: No oropharyngeal exudate  Eyes:      Extraocular Movements: Extraocular movements intact  Cardiovascular:      Rate and Rhythm: Normal rate and regular rhythm  Heart sounds: Normal heart sounds  Pulmonary:      Effort: Pulmonary effort is normal       Breath sounds: Normal breath sounds  Abdominal:      General: Bowel sounds are normal       Palpations: Abdomen is soft  Tenderness: There is no abdominal tenderness  Musculoskeletal:         General: Normal range of motion  Cervical back: Normal range of motion  Skin:     General: Skin is warm  Capillary Refill: Capillary refill takes less than 2 seconds  Neurological:      General: No focal deficit present  Mental Status: She is alert and oriented to person, place, and time  She is confused  Comments: No dysarthria  Cranial nerves II-XII grossly intact  Sensation and motor function throughout all 4 extremities are grossly intact  No ataxia    No vertical or horizontal nystagmus   Psychiatric:         Mood and Affect: Mood normal          Vital Signs  ED Triage Vitals [05/31/22 1114]   Temperature Pulse Respirations Blood Pressure SpO2   99 7 °F (37 6 °C) (!) 120 16 (!) 172/101 100 %      Temp Source Heart Rate Source Patient Position - Orthostatic VS BP Location FiO2 (%)   Tympanic Monitor Sitting Left arm --      Pain Score       8           Vitals:    05/31/22 1114 05/31/22 1530 05/31/22 1606   BP: (!) 172/101  158/75   Pulse: (!) 120 103 104   Patient Position - Orthostatic VS: Sitting           Visual Acuity      ED Medications  Medications   cefTRIAXone (ROCEPHIN) IVPB (premix in dextrose) 1,000 mg 50 mL (0 mg Intravenous Stopped 5/31/22 1746)   sodium chloride 0 9 % bolus 500 mL (0 mL Intravenous Stopped 5/31/22 1649)   potassium chloride (K-DUR,KLOR-CON) CR tablet 40 mEq (40 mEq Oral Given 5/31/22 1444)   magnesium sulfate 2 g/50 mL IVPB (premix) 2 g (0 g Intravenous Stopped 5/31/22 1649)       Diagnostic Studies  Results Reviewed     Procedure Component Value Units Date/Time    TSH, 3rd generation with Free T4 reflex [[id number]]  (Normal) Collected: 05/31/22 1659    Lab Status: Final result Specimen: Blood from Arm, Right Updated: 05/31/22 1743     TSH 3RD GENERATON 2 376 uIU/mL     Narrative:      Patients undergoing fluorescein dye angiography may retain small amounts of fluorescein in the body for 48-72 hours post procedure  Samples containing fluorescein can produce falsely depressed TSH values  If the patient had this procedure,a specimen should be resubmitted post fluorescein clearance        Procalcitonin [[id number]]  (Normal) Collected: 05/31/22 1659    Lab Status: Final result Specimen: Blood from Arm, Right Updated: 05/31/22 1737     Procalcitonin 0 24 ng/ml     Basic metabolic panel [[id number]]  (Abnormal) Collected: 05/31/22 1659    Lab Status: Final result Specimen: Blood from Arm, Right Updated: 05/31/22 1725     Sodium 135 mmol/L      Potassium 2 9 mmol/L      Chloride 98 mmol/L      CO2 21 mmol/L      ANION GAP 16 mmol/L      BUN 13 mg/dL      Creatinine 0 75 mg/dL      Glucose 105 mg/dL      Calcium 8 7 mg/dL      eGFR 86 ml/min/1 73sq m     Narrative:      [place] guidelines for Chronic Kidney Disease (CKD):     Stage 1 with normal or high GFR (GFR > 90 mL/min/1 73 square meters)    Stage 2 Mild CKD (GFR = 60-89 mL/min/1 73 square meters)    Stage 3A Moderate CKD (GFR = 45-59 mL/min/1 73 square meters)    Stage 3B Moderate CKD (GFR = 30-44 mL/min/1 73 square meters)    Stage 4 Severe CKD (GFR = 15-29 mL/min/1 73 square meters)    Stage 5 End Stage CKD (GFR <15 mL/min/1 73 square meters)  Note: GFR calculation is accurate only with a steady state creatinine    Urine Microscopic [[id number]]  (Abnormal) Collected: 05/31/22 1655    Lab Status: Final result Specimen: Urine, Straight Cath Updated: 05/31/22 1716     RBC, UA 2-4 /hpf      WBC, UA 30-50 /hpf      Epithelial Cells Occasional /hpf      Bacteria, UA Occasional /hpf     Urine culture [[id number]] Collected: 05/31/22 1655    Lab Status: In process Specimen: Urine, Straight Cath Updated: 05/31/22 1716    UA w Reflex to Microscopic w Reflex to Culture [[id number]]  (Abnormal) Collected: 05/31/22 1655    Lab Status: Final result Specimen: Urine, Straight Cath Updated: 05/31/22 1708     Color, UA Yellow     Clarity, UA Cloudy     Specific Gravity, UA 1 025     pH, UA 6 0     Leukocytes, UA 2+     Nitrite, UA Negative     Protein, UA Trace mg/dl      Glucose, UA Negative mg/dl      Ketones, UA 15 (1+) mg/dl      Urobilinogen, UA 0 2 E U /dl      Bilirubin, UA 2+     Blood, UA Trace-Intact    Magnesium [[id number]] Collected: 05/31/22 1659    Lab Status: In process Specimen: Blood from Arm, Right Updated: 05/31/22 1705    Folate [[id number]] Collected: 05/31/22 1659    Lab Status: No result Specimen: Blood from Arm, Right     Vitamin B12 [[id number]] Collected: 05/31/22 1659    Lab Status: No result Specimen: Blood from Arm, Right     Rapid drug screen, urine [[id number]] Collected: 05/31/22 1655    Lab Status: No result Specimen: Urine, Catheter     HS Troponin I 4hr [[id number]]  (Normal) Collected: 05/31/22 1603    Lab Status: Final result Specimen: Blood from Arm, Right Updated: 05/31/22 1634     hs TnI 4hr 16 ng/L      Delta 4hr hsTnI 1 ng/L     HIV 1/2 ANTIGEN/ANTIBODY (Clyda AntesThomasina Emilie [[id number]] Collected: 05/31/22 1603    Lab Status:  In process Specimen: Blood from Arm, Right Updated: 05/31/22 1606    COVID/FLU/RSV [[id number]]  (Normal) Collected: 05/31/22 1445    Lab Status: Final result Specimen: Nasopharyngeal Swab Updated: 05/31/22 1531     SARS-CoV-2 Negative     INFLUENZA A PCR Negative     INFLUENZA B PCR Negative     RSV PCR Negative    Narrative:      FOR PEDIATRIC PATIENTS - copy/paste COVID Guidelines URL to browser: [email or web address]    SARS-CoV-2 assay is a Nucleic Acid Amplification assay intended for the  qualitative detection of nucleic acid from SARS-CoV-2 in nasopharyngeal  swabs  Results are for the presumptive identification of SARS-CoV-2 RNA  Positive results are indicative of infection with SARS-CoV-2, the virus  causing COVID-19, but do not rule out bacterial infection or co-infection  with other viruses  Laboratories within the [place] and its  territories are required to report all positive results to the appropriate  public health authorities  Negative results do not preclude SARS-CoV-2  infection and should not be used as the sole basis for treatment or other  patient management decisions  Negative results must be combined with  clinical observations, patient history, and epidemiological information  This test has not been FDA cleared or approved  This test has been authorized by FDA under an Emergency Use Authorization  (EUA)  This test is only authorized for the duration of time the  declaration that circumstances exist justifying the authorization of the  emergency use of an in vitro diagnostic tests for detection of SARS-CoV-2  virus and/or diagnosis of COVID-19 infection under section 564(b)(1) of  the Act, 21 U  [place] C  678EOF-4(P)(5), unless the authorization is terminated  or revoked sooner  The test has been validated but independent review by FDA  and CLIA is pending  Test performed using [employer] GeneXpert: This RT-PCR assay targets N2,  a region unique to SARS-CoV-2  A conserved region in the E-gene was chosen  for pan-Sarbecovirus detection which includes SARS-CoV-2      HS Troponin I 2hr [[id number]]  (Normal) Collected: 05/31/22 1455    Lab Status: Final result Specimen: Blood from Arm, Left Updated: 05/31/22 1524     hs TnI 2hr 17 ng/L      Delta 2hr hsTnI 2 ng/L     Protime-INR [[id number]]  (Normal) Collected: 05/31/22 1159    Lab Status: Final result Specimen: Blood from Arm, Right Updated: 05/31/22 1242     Protime 13 7 seconds      INR 1 06    APTT [[id number]]  (Abnormal) Collected: 05/31/22 1159    Lab Status: Final result Specimen: Blood from Arm, Right Updated: 05/31/22 1242     PTT 40 seconds     TSH [[id number]]  (Normal) Collected: 05/31/22 1159    Lab Status: Final result Specimen: Blood from Arm, Right Updated: 05/31/22 1238     TSH 3RD GENERATON 2 142 uIU/mL     Narrative:      Patients undergoing fluorescein dye angiography may retain small amounts of fluorescein in the body for 48-72 hours post procedure  Samples containing fluorescein can produce falsely depressed TSH values  If the patient had this procedure,a specimen should be resubmitted post fluorescein clearance  HS Troponin 0hr (reflex protocol) [[id number]]  (Normal) Collected: 05/31/22 1159    Lab Status: Final result Specimen: Blood from Arm, Right Updated: 05/31/22 1230     hs TnI 0hr 15 ng/L     Ethanol [[id number]]  (Normal) Collected: 05/31/22 1159    Lab Status: Final result Specimen: Blood from Arm, Right Updated: 05/31/22 1229     Ethanol Lvl <10 mg/dL     Ammonia [[id number]]  (Normal) Collected: 05/31/22 1159    Lab Status: Final result Specimen: Blood from Arm, Right Updated: 05/31/22 1228     Ammonia 25 umol/L     Salicylate level [[id number]]  (Normal) Collected: 05/31/22 1159    Lab Status: Final result Specimen: Blood from Arm, Right Updated: 85/56/00 5018     Salicylate Lvl <5 mg/dL     Acetaminophen level-If concentration is detectable, please discuss with medical  on call   [[id number]]  (Abnormal) Collected: 05/31/22 1159    Lab Status: Final result Specimen: Blood from Arm, Right Updated: 05/31/22 1228     Acetaminophen Level <10 ug/mL     Comprehensive metabolic panel [[id number]]  (Abnormal) Collected: 05/31/22 1159    Lab Status: Final result Specimen: Blood from Arm, Right Updated: 05/31/22 1228     Sodium 136 mmol/L      Potassium 2 9 mmol/L      Chloride 96 mmol/L      CO2 23 mmol/L      ANION GAP 17 mmol/L      BUN 13 mg/dL      Creatinine 0 74 mg/dL      Glucose 117 mg/dL      Calcium 8 6 mg/dL      Corrected Calcium 9 5 mg/dL      AST 21 U/L      ALT 16 U/L      Alkaline Phosphatase 110 U/L      Total Protein 5 5 g/dL      Albumin 2 9 g/dL      Total Bilirubin 0 88 mg/dL      eGFR 88 ml/min/1 73sq m     Narrative:      [employer] guidelines for Chronic Kidney Disease (CKD):     Stage 1 with normal or high GFR (GFR > 90 mL/min/1 73 square meters)    Stage 2 Mild CKD (GFR = 60-89 mL/min/1 73 square meters)    Stage 3A Moderate CKD (GFR = 45-59 mL/min/1 73 square meters)    Stage 3B Moderate CKD (GFR = 30-44 mL/min/1 73 square meters)    Stage 4 Severe CKD (GFR = 15-29 mL/min/1 73 square meters)    Stage 5 End Stage CKD (GFR <15 mL/min/1 73 square meters)  Note: GFR calculation is accurate only with a steady state creatinine    Phosphorus [[id number]]  (Normal) Collected: 05/31/22 1159    Lab Status: Final result Specimen: Blood from Arm, Right Updated: 05/31/22 1228     Phosphorus 3 2 mg/dL     Magnesium [[id number]]  (Abnormal) Collected: 05/31/22 1159    Lab Status: Final result Specimen: Blood from Arm, Right Updated: 05/31/22 1228     Magnesium 1 6 mg/dL     CBC and differential [[id number]]  (Abnormal) Collected: 05/31/22 1159    Lab Status: Final result Specimen: Blood from Arm, Right Updated: 05/31/22 1207     WBC 10 78 Thousand/uL      RBC 2 49 Million/uL      Hemoglobin 8 7 g/dL      Hematocrit 26 7 %       fL      MCH 34 9 pg      MCHC 32 6 g/dL      RDW 16 7 %      MPV 9 2 fL      Platelets 128 Thousands/uL      nRBC 0 /100 WBCs      Neutrophils Relative 81 %      Immat GRANS % 0 %      Lymphocytes Relative 9 %      Monocytes Relative 10 %      Eosinophils Relative 0 %      Basophils Relative 0 %      Neutrophils Absolute 8 74 Thousands/µL      Immature Grans Absolute 0 04 Thousand/uL Lymphocytes Absolute 0 93 Thousands/µL      Monocytes Absolute 1 06 Thousand/µL      Eosinophils Absolute 0 00 Thousand/µL      Basophils Absolute 0 01 Thousands/µL                  XR chest 1 view   Final Result by Len Long MD (05/31 1530)      No acute cardiopulmonary disease  Workstation performed: [id number]         CT head without contrast   Final Result by Kenn Reyna MD (05/31 1235)      No acute intracranial abnormality  Workstation performed: [id number]                    Procedures  ECG 12 Lead Documentation Only    Date/Time: 5/31/2022 5:55 PM  Performed by: Marcelino Julio PA-C  Authorized by: Marcelino Julio PA-C     ECG reviewed by me, the ED Provider: yes    Patient location:  ED  Previous ECG:     Previous ECG:  Compared to current    Similarity:  No change    Comparison to cardiac monitor: Yes    Interpretation:     Interpretation: normal    Rate:     ECG rate assessment: normal    Rhythm:     Rhythm: sinus rhythm    Ectopy:     Ectopy: none    QRS:     QRS axis:  Normal  Conduction:     Conduction: normal    ST segments:     ST segments:  Normal  T waves:     T waves: normal    Comments:      No evidence of acute cardiac ischemia             ED Course                               SBIRT 20yo+    Flowsheet Row Most Recent Value   SBIRT (25 yo +)    In order to provide better care to our patients, we are screening all of our patients for alcohol and drug use  Would it be okay to ask you these screening questions? No Filed at: 05/31/2022 1612                    MDM  Number of Diagnoses or Management Options  Altered mental status  Hypokalemia  Hypomagnesemia  Diagnosis management comments: Patient presented with concerns of altered mental status  Patient not hyponatremic  No obvious infectious source however will admit to medicine with likely plan for observation, geriatric psychiatric consultation  Suspect likely UTI    Follow-up urine culture  Hemoglobin relatively at baseline  Patient denies any recent melena hematochezia  Patient family agreeable to plan  Disposition  Final diagnoses: Altered mental status   Hypokalemia   Hypomagnesemia     Time reflects when diagnosis was documented in both MDM as applicable and the Disposition within this note     Time User Action Codes Description Comment    5/31/2022  2:49 PM Hoa Fletcherist Add [R41 82] Altered mental status     5/31/2022  2:49 PM Hoa Artist Add [E87 6] Hypokalemia     5/31/2022  2:50 PM Hoa Fletcherist Add [E83 42] Hypomagnesemia     5/31/2022  4:50 PM Tom Diggs Add [R44 3] Hallucinations       ED Disposition     ED Disposition   Admit    Condition   Stable    Date/Time   Tue May 31, 2022  2:49 PM    Comment   Case was discussed with Dr Juliana Florentino and the patient's admission status was agreed to be Admission Status: inpatient status to the service of Dr Juliana Florentino  Follow-up Information    None         Patient's Medications   Discharge Prescriptions    No medications on file       No discharge procedures on file      PDMP Review     None          ED Provider  Electronically Signed by           Karly Forte PA-C  05/31/22 9931

## 2022-06-01 PROBLEM — N30.00 ACUTE CYSTITIS WITHOUT HEMATURIA: Status: ACTIVE | Noted: 2022-06-01

## 2022-06-01 LAB
ALBUMIN SERPL BCP-MCNC: 2.8 G/DL (ref 3.5–5)
ALP SERPL-CCNC: 95 U/L (ref 34–104)
ALT SERPL W P-5'-P-CCNC: 12 U/L (ref 7–52)
ANION GAP SERPL CALCULATED.3IONS-SCNC: 15 MMOL/L (ref 4–13)
AST SERPL W P-5'-P-CCNC: 16 U/L (ref 13–39)
BILIRUB SERPL-MCNC: 0.59 MG/DL (ref 0.2–1)
BUN SERPL-MCNC: 14 MG/DL (ref 5–25)
CALCIUM ALBUM COR SERPL-MCNC: 9.7 MG/DL (ref 8.3–10.1)
CALCIUM SERPL-MCNC: 8.7 MG/DL (ref 8.4–10.2)
CHLORIDE SERPL-SCNC: 100 MMOL/L (ref 96–108)
CO2 SERPL-SCNC: 22 MMOL/L (ref 21–32)
CREAT SERPL-MCNC: 0.68 MG/DL (ref 0.6–1.3)
ERYTHROCYTE [DISTWIDTH] IN BLOOD BY AUTOMATED COUNT: 17 % (ref 11.6–15.1)
FOLATE SERPL-MCNC: 6.6 NG/ML (ref 3.1–17.5)
GFR SERPL CREATININE-BSD FRML MDRD: 95 ML/MIN/1.73SQ M
GLUCOSE SERPL-MCNC: 92 MG/DL (ref 65–140)
HCT VFR BLD AUTO: 25.4 % (ref 34.8–46.1)
HGB BLD-MCNC: 8 G/DL (ref 11.5–15.4)
HIV 1+2 AB+HIV1 P24 AG SERPL QL IA: NORMAL
MCH RBC QN AUTO: 34.2 PG (ref 26.8–34.3)
MCHC RBC AUTO-ENTMCNC: 31.5 G/DL (ref 31.4–37.4)
MCV RBC AUTO: 109 FL (ref 82–98)
PLATELET # BLD AUTO: 299 THOUSANDS/UL (ref 149–390)
PMV BLD AUTO: 9.5 FL (ref 8.9–12.7)
POTASSIUM SERPL-SCNC: 2.8 MMOL/L (ref 3.5–5.3)
PROT SERPL-MCNC: 5.6 G/DL (ref 6.4–8.4)
RBC # BLD AUTO: 2.34 MILLION/UL (ref 3.81–5.12)
SODIUM SERPL-SCNC: 137 MMOL/L (ref 135–147)
VIT B12 SERPL-MCNC: 838 PG/ML (ref 100–900)
WBC # BLD AUTO: 7.53 THOUSAND/UL (ref 4.31–10.16)

## 2022-06-01 PROCEDURE — 99253 IP/OBS CNSLTJ NEW/EST LOW 45: CPT | Performed by: PSYCHIATRY & NEUROLOGY

## 2022-06-01 PROCEDURE — 99232 SBSQ HOSP IP/OBS MODERATE 35: CPT | Performed by: HOSPITALIST

## 2022-06-01 PROCEDURE — 80053 COMPREHEN METABOLIC PANEL: CPT | Performed by: STUDENT IN AN ORGANIZED HEALTH CARE EDUCATION/TRAINING PROGRAM

## 2022-06-01 PROCEDURE — 85027 COMPLETE CBC AUTOMATED: CPT | Performed by: STUDENT IN AN ORGANIZED HEALTH CARE EDUCATION/TRAINING PROGRAM

## 2022-06-01 RX ORDER — POTASSIUM CHLORIDE 29.8 MG/ML
40 INJECTION INTRAVENOUS ONCE
Status: DISCONTINUED | OUTPATIENT
Start: 2022-06-01 | End: 2022-06-01

## 2022-06-01 RX ORDER — AMLODIPINE BESYLATE 5 MG/1
5 TABLET ORAL DAILY
Status: DISCONTINUED | OUTPATIENT
Start: 2022-06-01 | End: 2022-06-02 | Stop reason: HOSPADM

## 2022-06-01 RX ORDER — POTASSIUM CHLORIDE 14.9 MG/ML
20 INJECTION INTRAVENOUS ONCE
Status: COMPLETED | OUTPATIENT
Start: 2022-06-01 | End: 2022-06-01

## 2022-06-01 RX ORDER — POTASSIUM CHLORIDE 14.9 MG/ML
20 INJECTION INTRAVENOUS ONCE
Status: COMPLETED | OUTPATIENT
Start: 2022-06-01 | End: 2022-06-02

## 2022-06-01 RX ADMIN — HEPARIN SODIUM 5000 UNITS: 5000 INJECTION INTRAVENOUS; SUBCUTANEOUS at 14:40

## 2022-06-01 RX ADMIN — HEPARIN SODIUM 5000 UNITS: 5000 INJECTION INTRAVENOUS; SUBCUTANEOUS at 05:02

## 2022-06-01 RX ADMIN — FLUCONAZOLE 200 MG: 100 TABLET ORAL at 09:31

## 2022-06-01 RX ADMIN — POTASSIUM CHLORIDE 20 MEQ: 14.9 INJECTION, SOLUTION INTRAVENOUS at 16:35

## 2022-06-01 RX ADMIN — FOLIC ACID 1 MG: 1 TABLET ORAL at 09:31

## 2022-06-01 RX ADMIN — CEFTRIAXONE 1000 MG: 1 INJECTION, SOLUTION INTRAVENOUS at 16:35

## 2022-06-01 RX ADMIN — AMLODIPINE BESYLATE 5 MG: 5 TABLET ORAL at 16:35

## 2022-06-01 RX ADMIN — DULOXETINE 20 MG: 20 CAPSULE, DELAYED RELEASE ORAL at 09:31

## 2022-06-01 RX ADMIN — BUSPIRONE HYDROCHLORIDE 10 MG: 5 TABLET ORAL at 09:31

## 2022-06-01 RX ADMIN — METOPROLOL SUCCINATE 50 MG: 50 TABLET, EXTENDED RELEASE ORAL at 09:31

## 2022-06-01 RX ADMIN — POTASSIUM CHLORIDE 20 MEQ: 14.9 INJECTION, SOLUTION INTRAVENOUS at 23:29

## 2022-06-01 RX ADMIN — HEPARIN SODIUM 5000 UNITS: 5000 INJECTION INTRAVENOUS; SUBCUTANEOUS at 22:49

## 2022-06-01 NOTE — CASE MANAGEMENT
Case Management Assessment & Discharge Planning Note    Patient name Madi [place]  Location [address] [id number] MRN [id number]  : 1960 Date 2022       Current Admission Date: 2022  Current Admission Diagnosis:Hallucinations   Patient Active Problem List    Diagnosis Date Noted    Electrolyte abnormality 2022    Hallucinations 2022    Decreased oral intake 2022    Thrush, oral 2022    Sore mouth 2022    Left arm pain 2022    Mucositis 2022    Acute kidney injury ([facility] [address] ) 2022    Hypomagnesemia 2022    Macrocytic anemia 2022    Arthritis of lumbar spine 2021    Vaginal atrophy 2021    At risk for loss of bone density 2021    Dyspareunia, female 2021    Class 1 obesity due to excess calories without serious comorbidity with body mass index (BMI) of 33 0 to 33 9 in adult 2020    Essential hypertension 2020    Anxiety and depression 2020    History of myocardial infarction 2017    S/P gastric bypass 2014      LOS (days): 1  Geometric Mean LOS (GMLOS) (days):   Days to GMLOS:     OBJECTIVE:    Risk of Unplanned Readmission Score: 18 58         Current admission status: Inpatient  Referral Reason: Other (Discharge planning)    Preferred Pharmacy:   [facility] [address], [address] [place] [address]  Phone: [phone] Fax: [phone]    Primary Care Provider: LETTY Ron    Primary Insurance:   Secondary Insurance:     ASSESSMENT:  Samir 26 Proxies    There are no active Health Care Proxies on file         Advance Directives  Does patient have a [address] [place] [address] Avenue?: No  Was patient offered paperwork?: Yes  Does patient currently have a Health Care decision maker?: Yes, please see Health Care Proxy section  Does patient have Advance Directives?: No  Was patient offered paperwork?: Yes  Primary Contact: Corbin ZAYAS         Readmission Root Cause  30 Day Readmission: No    Patient Information  Admitted from[de-identified] Home  Mental Status: Alert  During Assessment patient was accompanied by: Not accompanied during assessment  Assessment information provided by[de-identified] Patient  Primary Caregiver: Self  Support Systems: Self, Spouse/significant other  [place] of Residence: [facility] [address] do you live in?: [address] [facility] entry access options   Select all that apply : Stairs  Number of steps to enter home : 4  Do the steps have railings?: Yes  Type of Current Residence: 2 story home  Upon entering residence, is there a bedroom on the main floor (no further steps)?: Yes  Upon entering residence, is there a bathroom on the main floor (no further steps)?: Yes  In the last 12 months, was there a time when you were not able to pay the mortgage or rent on time?: No  In the last 12 months, how many places have you lived?: 1  In the last 12 months, was there a time when you did not have a steady place to sleep or slept in a shelter (including now)?: No  Homeless/housing insecurity resource given?: N/A  Living Arrangements: Lives w/ Son, Lives w/ Spouse/significant other  Is patient a ?: No    Activities of Daily Living Prior to Admission  Functional Status: Independent  Completes ADLs independently?: Yes  Ambulates independently?: Yes  Does patient use assisted devices?: No  Does patient currently own DME?: No  Does patient have a history of Outpatient Therapy (PT/OT)?: No  Does the patient have a history of Short-Term Rehab?: No  Does patient have a history of [facility]?: No  Does patient currently have [place] [address]?: No         Patient Information Continued  Income Source: Employed  Does patient have prescription coverage?: Yes  Within the past 12 months, you worried that your food would run out before you got the money to buy more : Never true  Within the past 12 months, the food you bought just didn't last and you didn't have money to get more : Never true  Food insecurity resource given?: N/A  Does patient receive dialysis treatments?: No  Does patient have a history of substance abuse?: No  Does patient have a history of Mental Health Diagnosis?: Yes (Anxiety and Depression)  Is patient receiving treatment for mental health?: Yes  Has patient received inpatient treatment related to mental health in the last 2 years?: No         Means of Transportation  Means of Transport to Appts[de-identified] Family transport  In the past 12 months, has lack of transportation kept you from medical appointments or from getting medications?: No  In the past 12 months, has lack of transportation kept you from meetings, work, or from getting things needed for daily living?: No  Was application for public transport provided?: N/A        DISCHARGE DETAILS:       Freedom of Choice: Yes  Comments - Freedom of Choice: Chart reviewed for discharge planning purposes  Awaiting psychiatry evaluation for disposition and discharge planning  CM to follow                       Requested [address]         Is the patient interested in [place] [address] at discharge?: No    DME Referral Provided  Referral made for DME?: No         Would you like to participate in our [address] service program?  : No - Declined    Treatment Team Recommendation:  (TBD pending psychiatry consult)

## 2022-06-01 NOTE — ASSESSMENT & PLAN NOTE
· Currently resolved  · Patient was brought to the ED for evaluation of hallucinations  · CT brain-no acute pathology  · Chest x-ray-no acute cardiopulmonary disease  · Procalcitonin, vitamin C20, folic acid level, and TSH testing are all within normal limits  · Suspect that this is all psychiatric in origin  · Formal psychiatry consultation is pending  · Discharge planning post psychiatry consultation completion

## 2022-06-01 NOTE — NURSING NOTE
Psych consult being called in at this time by [facility] Psychiatry ED Psych and med Surg Consults via tiger text

## 2022-06-01 NOTE — ASSESSMENT & PLAN NOTE
· Hypokalemia:-persists despite repletion, will further IV replete and recheck  · Hypo magnesemia:-resolved with repletion  · Possibly secondary to frequent MiraLax usage at home  · Repeat blood work in the a m

## 2022-06-01 NOTE — ASSESSMENT & PLAN NOTE
· Secondary to recent thrush infection likely contributing to electrolyte abnormalities  · Continue fluconazole  · Patient is on a regular house diet

## 2022-06-01 NOTE — PLAN OF CARE
Problem: INFECTION - ADULT  Goal: Absence or prevention of progression during hospitalization  Description: INTERVENTIONS:  - Assess and monitor for signs and symptoms of infection  - Monitor lab/diagnostic results  - Monitor all insertion sites, i e  indwelling lines, tubes, and drains  - Monitor endotracheal if appropriate and nasal secretions for changes in amount and color  - [place] appropriate cooling/warming therapies per order  - Administer medications as ordered  - Instruct and encourage patient and family to use good hand hygiene technique  - Identify and instruct in appropriate isolation precautions for identified infection/condition  Outcome: Progressing  Goal: Absence of fever/infection during neutropenic period  Description: INTERVENTIONS:  - Monitor WBC    Outcome: Progressing

## 2022-06-01 NOTE — PROGRESS NOTES
Lonnie 47  Progress Note - [employer] 1960, 63 y o  female MRN: [id number]  Unit/Bed#: -01 Encounter: [id number]  Primary Care Provider: LETTY Robles   Date and time admitted to hospital: 5/31/2022 11:18 AM    * Hallucinations  Assessment & Plan  · Currently resolved  · Patient was brought to the ED for evaluation of hallucinations  · CT brain-no acute pathology  · Chest x-ray-no acute cardiopulmonary disease  · Procalcitonin, vitamin M32, folic acid level, and TSH testing are all within normal limits  · Suspect that this is all psychiatric in origin  · Formal psychiatry consultation is pending  · Discharge planning post psychiatry consultation completion    Electrolyte abnormality  Assessment & Plan  · Hypokalemia:-persists despite repletion, will further IV replete and recheck  · Hypo magnesemia:-resolved with repletion  · Possibly secondary to frequent MiraLax usage at home  · Repeat blood work in the a m  Essential hypertension  Assessment & Plan  · Periods of accelerated hypertension noted  · Will add amlodipin, at baseline she only takes metoprolol XL  · Monitor blood pressure closely    Anxiety and depression  Assessment & Plan  · Formal psychiatry evaluation is pending  · Continue buspirone, Cymbalta    Decreased oral intake  Assessment & Plan  · Secondary to recent thrush infection likely contributing to electrolyte abnormalities  · Continue fluconazole  · Patient is on a regular house diet    Thrush, oral  Assessment & Plan  · Continue Diflucan    Acute cystitis without hematuria  Assessment & Plan  · Continue ceftriaxone day 2 of 3  · Urine culture result is pending        VTE Prophylaxis:  Heparin    Patient Centered Rounds: I have performed bedside rounds with nursing staff today      Discussions with Specialists or Other Care Team Provider:  Psychiatry, case management, nursing  Education and Discussions with Family / Patient:  Patient and her son were both brought up to par    Current Length of Stay: 1 day(s)    Current Patient Status: Inpatient   Certification Statement: The patient will continue to require additional inpatient hospital stay due to The need for psychiatric evaluation, and IV potassium supplementation    Discharge Plan:  Hopeful discharge planning for 2022    Code Status: Level 1 - Full Code    Subjective:   Patient seen and examined, sitting up in a chair, is currently alert, awake, and oriented x3, denies any pain or discomfort, is noted to have a flat affect    Objective:     Vitals:   Temp (24hrs), Av 7 °F (36 5 °C), Min:97 7 °F (36 5 °C), Max:97 7 °F (36 5 °C)    Temp:  [97 7 °F (36 5 °C)] 97 7 °F (36 5 °C)  HR:  [104-106] 106  Resp:  [12-18] 18  BP: (158-171)/(75-95) 171/95  SpO2:  [98 %] 98 %  Body mass index is 28 51 kg/m²  Input and Output Summary (last 24 hours):     No intake or output data in the 24 hours ending 22 1532    Physical Exam:   Physical Exam  Constitutional:       General: She is not in acute distress  Appearance: Normal appearance  She is normal weight  She is not ill-appearing  HENT:      Head: Normocephalic and atraumatic  Nose: Nose normal       Mouth/Throat:      Mouth: Mucous membranes are moist    Eyes:      Extraocular Movements: Extraocular movements intact  Pupils: Pupils are equal, round, and reactive to light  Cardiovascular:      Rate and Rhythm: Normal rate and regular rhythm  Pulses: Normal pulses  Heart sounds: Normal heart sounds  No murmur heard  No friction rub  No gallop  Pulmonary:      Effort: Pulmonary effort is normal  No respiratory distress  Breath sounds: Normal breath sounds  No wheezing, rhonchi or rales  Abdominal:      General: There is no distension  Palpations: Abdomen is soft  There is no mass  Tenderness: There is no abdominal tenderness  Hernia: No hernia is present     Musculoskeletal:         General: No swelling or tenderness  Normal range of motion  Cervical back: Normal range of motion and neck supple  No rigidity  Right lower leg: No edema  Left lower leg: No edema  Skin:     General: Skin is warm  Capillary Refill: Capillary refill takes less than 2 seconds  Findings: No erythema or rash  Neurological:      General: No focal deficit present  Mental Status: She is alert and oriented to person, place, and time  Mental status is at baseline  Cranial Nerves: No cranial nerve deficit  Motor: No weakness  Psychiatric:         Mood and Affect: Mood normal  Affect is flat  Speech: Speech normal          Behavior: Behavior is slowed and withdrawn  Behavior is cooperative  Thought Content: Thought content normal          Cognition and Memory: Cognition and memory normal          Additional Data:     Labs:    Results from last 7 days   Lab Units 06/01/22  0502 05/31/22  1159   WBC Thousand/uL 7 53 10 78*   HEMOGLOBIN g/dL 8 0* 8 7*   HEMATOCRIT % 25 4* 26 7*   PLATELETS Thousands/uL 299 319   NEUTROS PCT %  --  81*   LYMPHS PCT %  --  9*   MONOS PCT %  --  10   EOS PCT %  --  0     Results from last 7 days   Lab Units 06/01/22  0502   SODIUM mmol/L 137   POTASSIUM mmol/L 2 8*   CHLORIDE mmol/L 100   CO2 mmol/L 22   BUN mg/dL 14   CREATININE mg/dL 0 68   CALCIUM mg/dL 8 7   ALK PHOS U/L 95   ALT U/L 12   AST U/L 16     Results from last 7 days   Lab Units 05/31/22  1159   INR  1 06               * I Have Reviewed All Lab Data Listed Above  * Additional Pertinent Lab Tests Reviewed:  Param 66 Admission  Reviewed    Imaging:  Imaging Reports Reviewed Today Include:  None    Recent Cultures (last 7 days):           Last 24 Hours Medication List:   Current Facility-Administered Medications   Medication Dose Route Frequency Provider Last Rate    amLODIPine  5 mg Oral Daily Sarah Cope MD      busPIRone  10 mg Oral Daily Domnick Koyanagi Ok Beavers, DO      cefTRIAXone  1,000 mg Intravenous Q24H Andrez Pac, DO Stopped (05/31/22 9806)    DULoxetine  20 mg Oral Daily Andrez Pac, DO      fluconazole  200 mg Oral Daily Andrez Pac, DO      folic acid  1 mg Oral Daily Andrez Pac, DO      heparin (porcine)  5,000 Units Subcutaneous [facility] Andrez Pac, DO      metoprolol succinate  50 mg Oral Daily Andrez Pac, DO      potassium chloride  20 mEq Intravenous Once Thuan Luke MD      Followed by   Matt Riley potassium chloride  20 mEq Intravenous Once Thuan Luke MD          Today, Patient Was Seen By: Thuan Luke MD    ** Please Note: Dictation voice to text software may have been used in the creation of this document   **

## 2022-06-01 NOTE — ASSESSMENT & PLAN NOTE
· Periods of accelerated hypertension noted  · Will add amlodipin, at baseline she only takes metoprolol XL  · Monitor blood pressure closely

## 2022-06-01 NOTE — UTILIZATION REVIEW
Initial Clinical Review    Admission: Date/Time/Statement:   Admission Orders (From admission, onward)     Ordered        05/31/22 1451  Inpatient Admission  Once                      Orders Placed This Encounter   Procedures    Inpatient Admission     Standing Status:   Standing     Number of Occurrences:   1     Order Specific Question:   Level of Care     Answer:   Med Surg [16]     Order Specific Question:   Estimated length of stay     Answer:   More than 2 Midnights     Order Specific Question:   Certification     Answer:   I certify that inpatient services are medically necessary for this patient for a duration of greater than two midnights  See H&P and MD Progress Notes for additional information about the patient's course of treatment  ED Arrival Information     Expected   -    Arrival   5/31/2022 10:49    Acuity   Urgent            Means of arrival   Walk-In    Escorted by   Family Member    Service   Hospitalist    Admission type   Urgent            Arrival complaint   weakness,delerous           Chief Complaint   Patient presents with    Altered Mental Status     Altered mental status  Not eating since discharge  Per son she is seeing people that aren't there and saying things that don't make sense  Son believes this is nutrition related  Initial Presentation: 63 y o  female with a PMH of anxiety, depression, GERD, hypertension, to ED from home admitted Inpatient d/t hallucinations requiring psychiatric eval  presenting with hypokalemia and hypomagnesemia, likely secondary to poor oral intake  recently treated with Keflex for UTI after ED visit on 5/18  Mild leukocytosis  CT head negative for any acute intracranial processes  U/a ordered  IV antibiotic  psychiatry consult    Date: 6/1   Day 2: sitting up in a chair, is currently alert, awake, and oriented x3, denies any pain or discomfort, is noted to have a flat affect   continue to require additional inpatient hospital stay due to The need for psychiatric evaluation, and IV potassium supplementation        ED Triage Vitals [05/31/22 1114]   Temperature Pulse Respirations Blood Pressure SpO2   99 7 °F (37 6 °C) (!) 120 16 (!) 172/101 100 %      Temp Source Heart Rate Source Patient Position - Orthostatic VS BP Location FiO2 (%)   Tympanic Monitor Sitting Left arm --      Pain Score       8          Wt Readings from Last 1 Encounters:   05/31/22 66 2 kg (146 lb)     Additional Vital Signs:   06/01/22 0637 97 7 °F (36 5 °C) 106 Abnormal  18 171/95 Abnormal  -- -- Lying   05/31/22 1606 -- 104 12 158/75 98 % -- --   05/31/22 1530 -- 103 -- -- 99 % -- --   05/31/22 1114 99 7 °F (37 6 °C) 120 Abnormal  16 172/101 Abnormal  100 % None (Room air) Sitting       Pertinent Labs/Diagnostic Test Results:   5/31 EKG:  Date/Time: 5/31/2022 5:55 PM   Performed by: Liliana Castillo PA-C   Authorized by: Liliana Castillo PA-C     ECG reviewed by me, the ED Provider: yes     Patient location:  ED   Previous ECG:     Previous ECG:  Compared to current     Similarity:  No change     Comparison to cardiac monitor: Yes     Interpretation:     Interpretation: normal     Rate:     ECG rate assessment: normal     Rhythm:     Rhythm: sinus rhythm     Ectopy:     Ectopy: none     QRS:     QRS axis:  Normal   Conduction:     Conduction: normal     ST segments:     ST segments:  Normal   T waves:     T waves: normal     Comments:      No evidence of acute cardiac ischemia    5/31 EKG:  Sinus tachycardia with Premature atrial complexes  Left bundle branch block  Abnormal ECG  When compared with ECG of 18-MAY-2022 11:29,  No significant change was found     Confirmed by Hamlet Lorenz ([id number]) on 5/31/2022 2:14:44 PM    XR chest 1 view   Final Result by Rosalind Henriquez MD (05/31 1186)      No acute cardiopulmonary disease                    Workstation performed: [id number]         CT head without contrast   Final Result by Teri Hale MD (05/31 2524)      No acute intracranial abnormality                    Workstation performed: [id number]           Results from last 7 days   Lab Units 05/31/22  1445   SARS-COV-2  Negative     Results from last 7 days   Lab Units 06/01/22  0502 05/31/22  1159   WBC Thousand/uL 7 53 10 78*   HEMOGLOBIN g/dL 8 0* 8 7*   HEMATOCRIT % 25 4* 26 7*   PLATELETS Thousands/uL 299 319   NEUTROS ABS Thousands/µL  --  8 74*         Results from last 7 days   Lab Units 06/01/22  0502 05/31/22  1813 05/31/22  1659 05/31/22  1159   SODIUM mmol/L 137  --  135 136   POTASSIUM mmol/L 2 8*  --  2 9* 2 9*   CHLORIDE mmol/L 100  --  98 96   CO2 mmol/L 22  --  21 23   ANION GAP mmol/L 15*  --  16* 17*   BUN mg/dL 14  --  13 13   CREATININE mg/dL 0 68  --  0 75 0 74   EGFR ml/min/1 73sq m 95  --  86 88   CALCIUM mg/dL 8 7  --  8 7 8 6   MAGNESIUM mg/dL  --  2 2  --  1 6*   PHOSPHORUS mg/dL  --   --   --  3 2     Results from last 7 days   Lab Units 06/01/22  0502 05/31/22  1159   AST U/L 16 21   ALT U/L 12 16   ALK PHOS U/L 95 110*   TOTAL PROTEIN g/dL 5 6* 5 5*   ALBUMIN g/dL 2 8* 2 9*   TOTAL BILIRUBIN mg/dL 0 59 0 88   AMMONIA umol/L  --  25         Results from last 7 days   Lab Units 06/01/22  0502 05/31/22  1659 05/31/22  1159   GLUCOSE RANDOM mg/dL 92 105 117       Results from last 7 days   Lab Units 05/31/22  1603 05/31/22  1455 05/31/22  1159   HS TNI 0HR ng/L  --   --  15   HS TNI 2HR ng/L  --  17  --    HSTNI D2 ng/L  --  2  --    HS TNI 4HR ng/L 16  --   --    HSTNI D4 ng/L 1  --   --          Results from last 7 days   Lab Units 05/31/22  1159   PROTIME seconds 13 7   INR  1 06   PTT seconds 40*     Results from last 7 days   Lab Units 05/31/22  1659 05/31/22  1159   TSH 3RD GENERATON uIU/mL 2 376 2 142     Results from last 7 days   Lab Units 05/31/22  1659   PROCALCITONIN ng/ml 0 24       Results from last 7 days   Lab Units 05/31/22  1655   CLARITY UA  Cloudy*   COLOR UA  Yellow   SPEC GRAV UA  1 025   PH UA  6 0   GLUCOSE UA mg/dl Negative KETONES UA mg/dl 15 (1+)*   BLOOD UA  Trace-Intact*   PROTEIN UA mg/dl Trace*   NITRITE UA  Negative   BILIRUBIN UA  2+*   UROBILINOGEN UA E U /dl 0 2   LEUKOCYTES UA  2+*   WBC UA /hpf 30-50*   RBC UA /hpf 2-4   BACTERIA UA /hpf Occasional   EPITHELIAL CELLS WET PREP /hpf Occasional     Results from last 7 days   Lab Units 05/31/22  1445   INFLUENZA A PCR  Negative   INFLUENZA B PCR  Negative   RSV PCR  Negative         Results from last 7 days   Lab Units 05/31/22  1655   AMPH/METH  Negative   BARBITURATE UR  Negative   BENZODIAZEPINE UR  Negative   COCAINE UR  Negative   METHADONE URINE  Negative   OPIATE UR  Negative   PCP UR  Negative   THC UR  Negative     Results from last 7 days   Lab Units 05/31/22  1159   ETHANOL LVL mg/dL <10   ACETAMINOPHEN LVL ug/mL <36*   SALICYLATE LVL mg/dL <5       ED Treatment:   Medication Administration from 05/31/2022 1049 to 05/31/2022 4021       Date/Time Order Dose Route Action Action by Comments                05/31/2022 1223 sodium chloride 0 9 % bolus 500 mL 500 mL Intravenous New Bag       05/31/2022 1444 potassium chloride (K-DUR,KLOR-CON) CR tablet 40 mEq 40 mEq Oral Given                  05/31/2022 1443 magnesium sulfate 2 g/50 mL IVPB (premix) 2 g 2 g Intravenous New Bag                  05/31/2022 1708 cefTRIAXone (ROCEPHIN) IVPB (premix in dextrose) 1,000 mg 50 mL 1,000 mg Intravenous New Bag          Past Medical History:   Diagnosis Date    Acute cystitis without hematuria     [facility]2022    hospital admission    Anxiety     Bradycardia     Depression     Gastroenteritis     [facility] 2006    History of blood transfusion 1999    History of depression     Hypertension     Macrocytic anemia 04/05/2022    hospital admission    Past heart attack 2006     Present on Admission:   Thrush, oral   Anxiety and depression   Essential hypertension      Admitting Diagnosis: Hallucinations [R44 3]  Hypokalemia [E87 6]  Hypomagnesemia [E83 42]  Altered mental status [R41 82]  Age/Sex: 63 y o  female  Admission Orders:  Scheduled Medications:  busPIRone, 10 mg, Oral, Daily  cefTRIAXone, 1,000 mg, Intravenous, Q24H  DULoxetine, 20 mg, Oral, Daily  fluconazole, 200 mg, Oral, Daily  folic acid, 1 mg, Oral, Daily  heparin (porcine), 5,000 Units, Subcutaneous, Q8H AJ  metoprolol succinate, 50 mg, Oral, Daily    SCD  REG DIET      IP CONSULT TO PSYCHIATRY    Network Utilization Review Department  ATTENTION: Please call with any questions or concerns to [phone] and carefully listen to the prompts so that you are directed to the right person  All voicemails are confidential   Manju Goldman all requests for admission clinical reviews, approved or denied determinations and any other requests to dedicated fax number below belonging to the campus where the patient is receiving treatment   List of dedicated fax numbers for the Facilities:  [address] DENIALS (Administrative/Medical Necessity) [phone]   [address] (Maternity/NICU/Pediatrics) [phone]   [address]  [address] [facility] [place] [address] [address] [place] [address] [address] [facility] [address] [facility] [phone]

## 2022-06-01 NOTE — TELEMEDICINE
TeleConsultation - [address] 63 y o  female MRN: [id number]  Unit/Bed#: -01 Encounter: [id number]        REQUIRED DOCUMENTATION:     1  This service was provided via Telemedicine  2  Provider located at [facility]   3  TeleMed provider: Sushant Springer MD   4  Identify all parties in room with patient during tele consult:  pt and   5  Patient was then informed that this was a Telemedicine visit and that the exam was being conducted confidentially over secure lines  My office door was closed  No one else was in the room  Patient acknowledged consent and understanding of privacy and security of the Telemedicine visit, and gave us permission to have the assistant stay in the room in order to assist with the history and to conduct the exam   I informed the patient that I have reviewed their record in Epic and presented the opportunity for them to ask any questions regarding the visit today  The patient agreed to participate  Assessment/Plan     Assessment:  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; mood disorder not otherwise specified; anxiety disorder not otherwise specified; rule out major depression with psychotic features; rule out bipolar disorder depressed with psychotic features; rule out encephalopathy/delirium possibly multifactorial    Plan:   Risks, benefits and possible side effects of Medications:   Risks, benefits, and possible side effects of medications explained to patient and patient verbalizes understanding  Recommend adding Zyprexa 5 mg p o  q h s  as mood stabilizer and for psychosis as well as for anxiety and depression  the patient gives informed consent for this  Inpatient psychiatric treatment is not indicated this time as the patient does not appear to be a risk of harm to self or others unless we see worsening of her psychosis    The patient would benefit from outpatient psychiatric follow-up for further evaluation and medication management  Re-consult Psychiatry as needed  Suicide precautions are not indicated at this time  Chief Complaint:  I was having hallucinations    History of Present Illness     Reason for Consult / Principal Problem:  Hallucinations    Patient is a 63 y o  female who presented to the hospital whether providers documented following: Tian Ridley is a 63 y o  female with a PMH of anxiety, depression, GERD, hypertension who presents with hallucinations  Patient was brought into ED by family for evaluation of hallucinations  Family reports he has been primarily happening at night and have gotten increasingly worse over last several days  Also reports poor oral intake since diagnosed with thrush  In discussing with patient she adamantly denies any of the symptoms  States she feels fine  Family's concerned potential underlying psychiatric condition       The patient states she was not aware she was experiencing hallucinations but states that after her family explain to her what they were observed, she agreed that she must be hallucinating  She states she has believe that things were happening with people who are not in the area   states that over the last week or so both day and night patient frequently appears be talking with the interacting with people or situations that are not present  He states this is worse during the night but also has been occurring during the day over the past week  Past psychiatric history:  Patient denies any prior psychosis  She admits to treatment for depression anxiety currently on Cymbalta 20 mg p o  every day for depression and BuSpar for anxiety  Social history:  Patient is   She her  live home with 2 adult sons live with them along with for grand children    The patient states 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 feeling much pressured to develop numerous treatment plans stating this is much more complicated since arrival in the pandemic  Family history:  Unremarkable     Substance use history:  The patient states she may drink 2 double shots of alcohol every other day  She states her last alcohol was Saturday  She states he has hallucinations occur whether she has had the alcohol or not  She denies use of other substances  In status examination:  The patient is alert and oriented to person, place, time situation  She had a very intense look on her face throughout the evaluation  Speech was somewhat pressured  Speech was clearly articulated however  Sensorium appears clear at this time  Thought process appeared logical linear  Thought content was reality based at this time  Associations were tight  Memory appeared to be grossly intact at this time in all spheres  She appears to be of average intelligence by her use of vocabulary, general fund knowledge comes in structured syntax and the work that she performs  She denies suicidal homicidal ideation  She admits to the hallucinations and delusions as she and her  described as documented above  No overt hallucinations were observed this time  Nursing has not observed any active hallucinations or delusions on the present shift  Insight and judgment appear to be intact at this time with patient highly motivated for treatment   presents is very supportive as well          Inpatient consult to Psychiatry  Consult performed by: Byron Franz MD  Consult ordered by: Thomas Wells DO            Past Medical History:   Diagnosis Date    Acute cystitis without hematuria     Acute kidney injury ([facility] [address] ) 04/05/2022    hospital admission    Anxiety     Bradycardia     Depression     Gastroenteritis     Heart attack [facility]) 2006    History of blood transfusion 1999    History of depression     Hypertension     Macrocytic anemia 04/05/2022    hospital admission    Past heart attack 2006       Medical Review Of Systems:  Review of Systems    Meds/Allergies   all current active meds have been reviewed  Allergies   Allergen Reactions    Oxycodone-Acetaminophen Hives     per t-system      Propoxyphene Hives    Shellfish Allergy - Food Allergy        Objective   Vital signs in last 24 hours:  Temp:  [97 7 °F (36 5 °C)] 97 7 °F (36 5 °C)  HR:  [104-106] 106  Resp:  [12-18] 18  BP: (158-171)/(75-95) 171/95    No intake or output data in the 24 hours ending 06/01/22 1543      Lab Results:  Reviewed  Imaging Studies:  Reviewed  EKG, Pathology, and Other Studies:  Reviewed    Code Status: Level 1 - Full Code  Advance Directive and Living Will:      Power of :    POLST:      Counseling / Coordination of Care  Total floor / unit time spent today 30 minutes  Greater than 50% of total time was spent with the patient and / or family counseling and / or coordination of care  A description of the counseling / coordination of care:  Chart review, patient evaluation, coordination communication with staff, nursing and provider

## 2022-06-02 ENCOUNTER — TRANSITIONAL CARE MANAGEMENT (OUTPATIENT)
Dept: FAMILY MEDICINE CLINIC | Facility: CLINIC | Age: 62
End: 2022-06-02

## 2022-06-02 VITALS
HEART RATE: 83 BPM | WEIGHT: 146 LBS | TEMPERATURE: 98.2 F | SYSTOLIC BLOOD PRESSURE: 128 MMHG | DIASTOLIC BLOOD PRESSURE: 67 MMHG | BODY MASS INDEX: 28.66 KG/M2 | OXYGEN SATURATION: 100 % | HEIGHT: 60 IN | RESPIRATION RATE: 18 BRPM

## 2022-06-02 LAB
ANION GAP SERPL CALCULATED.3IONS-SCNC: 14 MMOL/L (ref 4–13)
BASOPHILS # BLD AUTO: 0.01 THOUSANDS/ΜL (ref 0–0.1)
BASOPHILS NFR BLD AUTO: 0 % (ref 0–1)
BUN SERPL-MCNC: 16 MG/DL (ref 5–25)
CALCIUM SERPL-MCNC: 8.7 MG/DL (ref 8.4–10.2)
CHLORIDE SERPL-SCNC: 99 MMOL/L (ref 96–108)
CO2 SERPL-SCNC: 23 MMOL/L (ref 21–32)
CREAT SERPL-MCNC: 0.76 MG/DL (ref 0.6–1.3)
EOSINOPHIL # BLD AUTO: 0.02 THOUSAND/ΜL (ref 0–0.61)
EOSINOPHIL NFR BLD AUTO: 0 % (ref 0–6)
ERYTHROCYTE [DISTWIDTH] IN BLOOD BY AUTOMATED COUNT: 16.9 % (ref 11.6–15.1)
GFR SERPL CREATININE-BSD FRML MDRD: 85 ML/MIN/1.73SQ M
GLUCOSE SERPL-MCNC: 86 MG/DL (ref 65–140)
HCT VFR BLD AUTO: 27.8 % (ref 34.8–46.1)
HGB BLD-MCNC: 8.7 G/DL (ref 11.5–15.4)
IMM GRANULOCYTES # BLD AUTO: 0.02 THOUSAND/UL (ref 0–0.2)
IMM GRANULOCYTES NFR BLD AUTO: 0 % (ref 0–2)
LYMPHOCYTES # BLD AUTO: 2.16 THOUSANDS/ΜL (ref 0.6–4.47)
LYMPHOCYTES NFR BLD AUTO: 24 % (ref 14–44)
MCH RBC QN AUTO: 34.5 PG (ref 26.8–34.3)
MCHC RBC AUTO-ENTMCNC: 31.3 G/DL (ref 31.4–37.4)
MCV RBC AUTO: 110 FL (ref 82–98)
MONOCYTES # BLD AUTO: 0.91 THOUSAND/ΜL (ref 0.17–1.22)
MONOCYTES NFR BLD AUTO: 10 % (ref 4–12)
NEUTROPHILS # BLD AUTO: 6.02 THOUSANDS/ΜL (ref 1.85–7.62)
NEUTS SEG NFR BLD AUTO: 66 % (ref 43–75)
NRBC BLD AUTO-RTO: 0 /100 WBCS
PLATELET # BLD AUTO: 344 THOUSANDS/UL (ref 149–390)
PMV BLD AUTO: 9.4 FL (ref 8.9–12.7)
POTASSIUM SERPL-SCNC: 3.1 MMOL/L (ref 3.5–5.3)
RBC # BLD AUTO: 2.52 MILLION/UL (ref 3.81–5.12)
SODIUM SERPL-SCNC: 136 MMOL/L (ref 135–147)
WBC # BLD AUTO: 9.14 THOUSAND/UL (ref 4.31–10.16)

## 2022-06-02 PROCEDURE — 85025 COMPLETE CBC W/AUTO DIFF WBC: CPT | Performed by: HOSPITALIST

## 2022-06-02 PROCEDURE — 80048 BASIC METABOLIC PNL TOTAL CA: CPT | Performed by: HOSPITALIST

## 2022-06-02 PROCEDURE — 99239 HOSP IP/OBS DSCHRG MGMT >30: CPT | Performed by: HOSPITALIST

## 2022-06-02 RX ORDER — POTASSIUM CHLORIDE 14.9 MG/ML
20 INJECTION INTRAVENOUS
Status: DISPENSED | OUTPATIENT
Start: 2022-06-02 | End: 2022-06-02

## 2022-06-02 RX ORDER — OLANZAPINE 5 MG/1
5 TABLET ORAL
Status: DISCONTINUED | OUTPATIENT
Start: 2022-06-02 | End: 2022-06-02 | Stop reason: HOSPADM

## 2022-06-02 RX ORDER — OLANZAPINE 5 MG/1
5 TABLET ORAL
Qty: 30 TABLET | Refills: 0 | Status: SHIPPED | OUTPATIENT
Start: 2022-06-02 | End: 2022-07-02

## 2022-06-02 RX ORDER — AMLODIPINE BESYLATE 5 MG/1
5 TABLET ORAL DAILY
Qty: 30 TABLET | Refills: 0 | Status: SHIPPED | OUTPATIENT
Start: 2022-06-03 | End: 2022-06-06 | Stop reason: SDUPTHER

## 2022-06-02 RX ADMIN — AMLODIPINE BESYLATE 5 MG: 5 TABLET ORAL at 08:10

## 2022-06-02 RX ADMIN — FOLIC ACID 1 MG: 1 TABLET ORAL at 08:10

## 2022-06-02 RX ADMIN — BUSPIRONE HYDROCHLORIDE 10 MG: 5 TABLET ORAL at 08:10

## 2022-06-02 RX ADMIN — METOPROLOL SUCCINATE 50 MG: 50 TABLET, EXTENDED RELEASE ORAL at 08:10

## 2022-06-02 RX ADMIN — HEPARIN SODIUM 5000 UNITS: 5000 INJECTION INTRAVENOUS; SUBCUTANEOUS at 06:52

## 2022-06-02 RX ADMIN — DULOXETINE 20 MG: 20 CAPSULE, DELAYED RELEASE ORAL at 08:10

## 2022-06-02 RX ADMIN — POTASSIUM CHLORIDE 20 MEQ: 14.9 INJECTION, SOLUTION INTRAVENOUS at 08:06

## 2022-06-02 RX ADMIN — FLUCONAZOLE 200 MG: 100 TABLET ORAL at 08:10

## 2022-06-02 RX ADMIN — CEFTRIAXONE 1000 MG: 1 INJECTION, SOLUTION INTRAVENOUS at 12:14

## 2022-06-02 NOTE — ASSESSMENT & PLAN NOTE
· Currently resolved-secondary to psychiatric mood disorder-not otherwise specified  · Patient was brought to the ED for evaluation of hallucinations  · CT brain-no acute pathology  · Chest x-ray-no acute cardiopulmonary disease  · Procalcitonin, vitamin Y46, folic acid level, and TSH testing are all within normal limits  · Status post a Psychiatry evaluation  · Okay for discharge  · Zyprexa has been added  · Outpatient follow-up with PCP, outpatient follow-up with psychiatry

## 2022-06-02 NOTE — NURSING NOTE
Pt bladder scanned at this time for 145mls  Pt stated she hasn't voided all day  Pt not complaining of urinary symptoms such a pain/burning  Pt has large areas of excoriation on abd folds  Open and bleeding  Pt stated she came in with them  Offered to clean and put cream on pt but pt stating she is going to shower and do it herself  Wounds documented in flowsheet

## 2022-06-02 NOTE — ASSESSMENT & PLAN NOTE
· Hypokalemia:-improved with repletion, and was further repleted IV prior to discharge  · Hypo magnesemia:-resolved with repletion  · Possibly secondary to frequent MiraLax usage at home  · Will need outpatient BMP monitoring within 7-10 days

## 2022-06-02 NOTE — NURSING NOTE
Contacted via phone call by microbiology that pt's preliminary results for urine culture positive for Neisseria Meningitidis  Initiated Isolation precautions and made hospitalist aware, no new orders at this time

## 2022-06-02 NOTE — NURSING NOTE
Pt DC to home via spouse in stable condition  All belongings packed and sent with pt  All DC instructions reviewed with pt and spouse

## 2022-06-02 NOTE — ASSESSMENT & PLAN NOTE
· Urine culture was positive for Neisseria meningitidis  · Case reviewed with Infectious Disease  · Okay for discharge   · Patient completed 3 days of IV ceftriaxone here in-house

## 2022-06-02 NOTE — DISCHARGE INSTR - AVS FIRST PAGE
Dear Donna Brown,     It was our pleasure to care for you here at [facility]  It is our hope that we were always able to exceed the expected standards for your care during your stay  You were hospitalized due to hallucinations, and were treated for urinary tract infection  You were cared for on the medical/surgical floor by Zulay Thao MD with the [facility] who covers for your primary care physician (PCP), LETTY Lorenzo, while you were hospitalized  If you have any questions or concerns related to this hospitalization, you may contact us at [phone]  For follow up as well as any medication refills, we recommend that you follow up with your primary care physician  A registered nurse will reach out to you by phone within a few days after your discharge to answer any additional questions that you may have after going home  However, at this time we provide for you here, the most important instructions / recommendations at discharge:     Notable Medication Adjustments -   New prescription Zyprexa 5 mg take 1 tablet daily at night as needed for agitation and episodes of psychosis  New prescription amlodipine 5 mg-take 1 tablet daily by mouth for blood pressure control  Okay to resume all other pre-admission medications at the preadmission doses  Testing Required after Discharge -   To be further determined in the outpatient setting by your primary care provider  Important follow up information -   Please follow-up with the providers as outlined in this discharge package  Other Instructions -   Please maintain a healthy diet  Please review this entire after visit summary as additional general instructions including medication list, appointments, activity, diet, any pertinent wound care, and other additional recommendations from your care team that may be provided for you        Sincerely,     Zulay Thao MD

## 2022-06-02 NOTE — ASSESSMENT & PLAN NOTE
· Psych eval has been completed, will add Zyprexa to her discharge medication regimen  · Continue buspirone, Cymbalta post discharge

## 2022-06-02 NOTE — PLAN OF CARE
Problem: Nutrition/Hydration-ADULT  Goal: Nutrient/Hydration intake appropriate for improving, restoring or maintaining nutritional needs  Description: Monitor and assess patient's nutrition/hydration status for malnutrition  Collaborate with interdisciplinary team and initiate plan and interventions as ordered  Monitor patient's weight and dietary intake as ordered or per policy  Utilize nutrition screening tool and intervene as necessary  Determine patient's food preferences and provide high-protein, high-caloric foods as appropriate       INTERVENTIONS:  - Monitor oral intake, urinary output, labs, and treatment plans  - Assess nutrition and hydration status and recommend course of action  - Evaluate amount of meals eaten  - Assist patient with eating if necessary   - Allow adequate time for meals  - Recommend/ encourage appropriate diets, oral nutritional supplements, and vitamin/mineral supplements  - Order, calculate, and assess calorie counts as needed  - Recommend, monitor, and adjust tube feedings and TPN/PPN based on assessed needs  - Assess need for intravenous fluids  - Provide specific nutrition/hydration education as appropriate  - Include patient/family/caregiver in decisions related to nutrition  Outcome: Progressing     Problem: Potential for Falls  Goal: Patient will remain free of falls  Description: INTERVENTIONS:  - Educate patient/family on patient safety including physical limitations  - Instruct patient to call for assistance with activity   - Consult OT/PT to assist with strengthening/mobility   - Keep Call bell within reach  - Keep bed low and locked with side rails adjusted as appropriate  - Keep care items and personal belongings within reach  - Initiate and maintain comfort rounds  - Make Fall Risk Sign visible to staff  - Offer Toileting every 2 Hours, in advance of need  - Initiate/Maintain bed alarm  - Obtain necessary fall risk management equipment: non skid socks  - Apply yellow socks and bracelet for high fall risk patients  - Consider moving patient to room near nurses station  Outcome: Progressing

## 2022-06-02 NOTE — ASSESSMENT & PLAN NOTE
· Periods of accelerated hypertension noted  · Will add amlodipin, at baseline she only takes metoprolol XL  · Amlodipine prescription was provided at time of discharge

## 2022-06-02 NOTE — ASSESSMENT & PLAN NOTE
· Secondary to recent thrush infection likely contributing to electrolyte abnormalities  · Continue fluconazole post discharge to complete her prescribed course  · Patient is on a regular house diet

## 2022-06-02 NOTE — DISCHARGE SUMMARY
[address] 47  Discharge- Ashwin Pisano 1960, 63 y o  female MRN: [id number]  Unit/Bed#: -01 Encounter: [id number]  Primary Care Provider: LETTY Hoover   Date and time admitted to hospital: 5/31/2022 11:18 AM    * Hallucinations  Assessment & Plan  · Currently resolved-secondary to psychiatric mood disorder-not otherwise specified  · Patient was brought to the ED for evaluation of hallucinations  · CT brain-no acute pathology  · Chest x-ray-no acute cardiopulmonary disease  · Procalcitonin, vitamin D05, folic acid level, and TSH testing are all within normal limits  · Status post a Psychiatry evaluation  · Okay for discharge  · Zyprexa has been added  · Outpatient follow-up with PCP, outpatient follow-up with psychiatry    Electrolyte abnormality  Assessment & Plan  · Hypokalemia:-improved with repletion, and was further repleted IV prior to discharge  · Hypo magnesemia:-resolved with repletion  · Possibly secondary to frequent MiraLax usage at home  · Will need outpatient BMP monitoring within 7-10 days    Essential hypertension  Assessment & Plan  · Periods of accelerated hypertension noted  · Will add amlodipin, at baseline she only takes metoprolol XL  · Amlodipine prescription was provided at time of discharge    Anxiety and depression  Assessment & Plan  · Psych eval has been completed, will add Zyprexa to her discharge medication regimen  · Continue buspirone, Cymbalta post discharge    Decreased oral intake  Assessment & Plan  · Secondary to recent thrush infection likely contributing to electrolyte abnormalities  · Continue fluconazole post discharge to complete her prescribed course  · Patient is on a regular house diet    Thrush, oral  Assessment & Plan  · Continue Diflucan post discharge    Acute cystitis without hematuria  Assessment & Plan  · Urine culture was positive for Neisseria meningitidis  · Case reviewed with Infectious Disease  · Okay for discharge · Patient completed 3 days of IV ceftriaxone here in-house        Discharging Physician / Practitioner: Fabiola Beaver MD  PCP: Elizabeth Sanchez  Admission Date:   Admission Orders (From admission, onward)     Ordered        05/31/22 1451  Inpatient Admission  Once                      Discharge Date: 06/02/22    Medical Problems             Resolved Problems  Date Reviewed: 5/31/2022   None                 Consultations During Hospital Stay:  · Psychiatry  · Curbside Infectious Disease    Procedures Performed:   · None    Significant Findings / Test Results:   · X-ray chest - no acute cardiopulmonary disease  · CT brain-no acute intercranial abnormality    Incidental Findings:   · None     Test Results Pending at Discharge (will require follow up): · None     Outpatient Tests Requested:  · None    Complications:     None    Reason for Admission:  Hallucinations    Hospital Course:     Jeremiah Graham is a 63 y o  female patient who originally presented to the hospital on 5/31/2022 due to hallucinations  Please refer to the initial history and physical examination completed by Dr Becca Reid for the initial presenting features and complaints  In brief, the patient is a 53-year-old female, who presented to the hospital with a chief complaint of hallucinations  A CT scan in the emergency room was performed which yielded no acute cardiopulmonary disease  Patient was [facility]  It was felt that her symptoms were more psychiatric in origin  A Psychiatry consultation was obtained, Psychiatry was in agreement that her symptoms were most likely secondary to some sort of underlying psychiatric mood disorder not otherwise specified  They recommended the addition of Zyprexa  Patient did not meet any type of requirement for inpatient psychiatric help  Patient was also treated for what turned out to be eventually a Neisseria meningitidis urinary tract infection    She completed 3 days worth of IV ceftriaxone in-house  Patient was also noted to be hypokalemic, electrolytes were repleted  She was deemed medically stable on 06/02/2022 and was discharged home  Please refer to the assessment/plan portion of this discharge summary as outlined above for the remainder of the details in regard to her stay  Please see above list of diagnoses and related plan for additional information  Condition at Discharge: good     Discharge Day Visit / Exam:     Subjective:  Patient seen and examined, looks and feels well, no complaints, no distress  Vitals: Blood Pressure: 128/67 (06/02/22 0733)  Pulse: 83 (06/01/22 2222)  Temperature: 98 2 °F (36 8 °C) (06/02/22 0733)  Temp Source: Temporal (06/01/22 2222)  Respirations: 18 (06/02/22 0733)  Height: 5' (152 4 cm) (05/31/22 1114)  Weight - Scale: 66 2 kg (146 lb) (05/31/22 1114)  SpO2: 100 % (06/01/22 2222)  Exam:   Physical Exam  Constitutional:       General: She is not in acute distress  Appearance: Normal appearance  She is normal weight  She is not ill-appearing  HENT:      Head: Normocephalic and atraumatic  Nose: Nose normal       Mouth/Throat:      Mouth: Mucous membranes are moist    Eyes:      Extraocular Movements: Extraocular movements intact  Pupils: Pupils are equal, round, and reactive to light  Cardiovascular:      Rate and Rhythm: Normal rate and regular rhythm  Pulses: Normal pulses  Heart sounds: Normal heart sounds  No murmur heard  No friction rub  No gallop  Pulmonary:      Effort: Pulmonary effort is normal  No respiratory distress  Breath sounds: Normal breath sounds  No wheezing, rhonchi or rales  Abdominal:      General: There is no distension  Palpations: Abdomen is soft  There is no mass  Tenderness: There is no abdominal tenderness  Hernia: No hernia is present  Musculoskeletal:         General: No swelling or tenderness  Normal range of motion        Cervical back: Normal range of motion and neck supple  No rigidity  Right lower leg: No edema  Left lower leg: No edema  Skin:     General: Skin is warm  Capillary Refill: Capillary refill takes less than 2 seconds  Findings: No erythema or rash  Neurological:      General: No focal deficit present  Mental Status: She is alert and oriented to person, place, and time  Mental status is at baseline  Cranial Nerves: No cranial nerve deficit  Motor: No weakness  Psychiatric:         Mood and Affect: Mood normal          Behavior: Behavior normal          Discussion with Family:  Patient's  was bedside at the time of my discharge evaluation, all questions answered to his satisfaction    Discharge instructions/Information to patient and family:   See after visit summary for information provided to patient and family  Provisions for Follow-Up Care:  See after visit summary for information related to follow-up care and any pertinent home health orders  Disposition:     Home      Planned Readmission:    None     Discharge Statement:  I spent 40 minutes discharging the patient  This time was spent on the day of discharge  I had direct contact with the patient on the day of discharge  Greater than 50% of the total time was spent examining patient, answering all patient questions, arranging and discussing plan of care with patient as well as directly providing post-discharge instructions  Additional time then spent on discharge activities  Discharge Medications:  See after visit summary for reconciled discharge medications provided to patient and family        ** Please Note: This note has been constructed using a voice recognition system **

## 2022-06-02 NOTE — PLAN OF CARE
Problem: Nutrition/Hydration-ADULT  Goal: Nutrient/Hydration intake appropriate for improving, restoring or maintaining nutritional needs  Description: Monitor and assess patient's nutrition/hydration status for malnutrition  Collaborate with interdisciplinary team and initiate plan and interventions as ordered  Monitor patient's weight and dietary intake as ordered or per policy  Utilize nutrition screening tool and intervene as necessary  Determine patient's food preferences and provide high-protein, high-caloric foods as appropriate       INTERVENTIONS:  - Monitor oral intake, urinary output, labs, and treatment plans  - Assess nutrition and hydration status and recommend course of action  - Evaluate amount of meals eaten  - Assist patient with eating if necessary   - Allow adequate time for meals  - Recommend/ encourage appropriate diets, oral nutritional supplements, and vitamin/mineral supplements  - Order, calculate, and assess calorie counts as needed  - Recommend, monitor, and adjust tube feedings and TPN/PPN based on assessed needs  - Assess need for intravenous fluids  - Provide specific nutrition/hydration education as appropriate  - Include patient/family/caregiver in decisions related to nutrition  Outcome: Progressing     Problem: Potential for Falls  Goal: Patient will remain free of falls  Description: INTERVENTIONS:  - Educate patient/family on patient safety including physical limitations  - Instruct patient to call for assistance with activity   - Consult OT/PT to assist with strengthening/mobility   - Keep Call bell within reach  - Keep bed low and locked with side rails adjusted as appropriate  - Keep care items and personal belongings within reach  - Initiate and maintain comfort rounds  - Make Fall Risk Sign visible to staff  - Offer Toileting every 1 Hours, in advance of need  - Initiate/Maintain bed alarm  - Obtain necessary fall risk management equipment: yellow socks   - Apply yellow socks and bracelet for high fall risk patients  - Consider moving patient to room near nurses station  Outcome: Progressing     Problem: PAIN - ADULT  Goal: Verbalizes/displays adequate comfort level or baseline comfort level  Description: Interventions:  - Encourage patient to monitor pain and request assistance  - Assess pain using appropriate pain scale  - Administer analgesics based on type and severity of pain and evaluate response  - Implement non-pharmacological measures as appropriate and evaluate response  - Consider cultural and social influences on pain and pain management  - Notify physician/advanced practitioner if interventions unsuccessful or patient reports new pain  Outcome: Progressing     Problem: INFECTION - ADULT  Goal: Absence or prevention of progression during hospitalization  Description: INTERVENTIONS:  - Assess and monitor for signs and symptoms of infection  - Monitor lab/diagnostic results  - Monitor all insertion sites, i e  indwelling lines, tubes, and drains  - Monitor endotracheal if appropriate and nasal secretions for changes in amount and color  - [place] appropriate cooling/warming therapies per order  - Administer medications as ordered  - Instruct and encourage patient and family to use good hand hygiene technique  - Identify and instruct in appropriate isolation precautions for identified infection/condition  Outcome: Progressing  Goal: Absence of fever/infection during neutropenic period  Description: INTERVENTIONS:  - Monitor WBC    Outcome: Progressing     Problem: SAFETY ADULT  Goal: Patient will remain free of falls  Description: INTERVENTIONS:  - Educate patient/family on patient safety including physical limitations  - Instruct patient to call for assistance with activity   - Consult OT/PT to assist with strengthening/mobility   - Keep Call bell within reach  - Keep bed low and locked with side rails adjusted as appropriate  - Keep care items and personal belongings within reach  - Initiate and maintain comfort rounds  - Make Fall Risk Sign visible to staff  - Offer Toileting every 1 Hours, in advance of need  - Initiate/Maintain bed alarm  - Obtain necessary fall risk management equipment: yellow socks   - Apply yellow socks and bracelet for high fall risk patients  - Consider moving patient to room near nurses station  Outcome: Progressing  Goal: Maintain or return to baseline ADL function  Description: INTERVENTIONS:  -  Assess patient's ability to carry out ADLs; assess patient's baseline for ADL function and identify physical deficits which impact ability to perform ADLs (bathing, care of mouth/teeth, toileting, grooming, dressing, etc )  - Assess/evaluate cause of self-care deficits   - Assess range of motion  - Assess patient's mobility; develop plan if impaired  - Assess patient's need for assistive devices and provide as appropriate  - Encourage maximum independence but intervene and supervise when necessary  - Involve family in performance of ADLs  - Assess for home care needs following discharge   - Consider OT consult to assist with ADL evaluation and planning for discharge  - Provide patient education as appropriate  Outcome: Progressing  Goal: Maintains/Returns to pre admission functional level  Description: INTERVENTIONS:  - Perform BMAT or MOVE assessment daily    - Set and communicate daily mobility goal to care team and patient/family/caregiver  - Collaborate with rehabilitation services on mobility goals if consulted  - Perform Range of Motion 3 times a day  - Reposition patient every 2 hours    - Dangle patient 3 times a day  - Stand patient 3 times a day  - Ambulate patient 3 times a day  - Out of bed to chair 3 times a day   - Out of bed for meals 3 times a day  - Out of bed for toileting  - Record patient progress and toleration of activity level   Outcome: Progressing

## 2022-06-06 ENCOUNTER — OFFICE VISIT (OUTPATIENT)
Dept: FAMILY MEDICINE CLINIC | Facility: CLINIC | Age: 62
End: 2022-06-06
Payer: COMMERCIAL

## 2022-06-06 VITALS
SYSTOLIC BLOOD PRESSURE: 126 MMHG | WEIGHT: 143 LBS | TEMPERATURE: 97.4 F | BODY MASS INDEX: 28.07 KG/M2 | DIASTOLIC BLOOD PRESSURE: 78 MMHG | HEART RATE: 68 BPM | HEIGHT: 60 IN

## 2022-06-06 DIAGNOSIS — E87.6 HYPOKALEMIA: ICD-10-CM

## 2022-06-06 DIAGNOSIS — F41.9 ANXIETY AND DEPRESSION: ICD-10-CM

## 2022-06-06 DIAGNOSIS — F32.A ANXIETY AND DEPRESSION: Primary | ICD-10-CM

## 2022-06-06 DIAGNOSIS — B37.0 THRUSH, ORAL: ICD-10-CM

## 2022-06-06 DIAGNOSIS — E66.3 OVERWEIGHT WITH BODY MASS INDEX (BMI) OF 27 TO 27.9 IN ADULT: ICD-10-CM

## 2022-06-06 DIAGNOSIS — K13.79 SORE MOUTH: ICD-10-CM

## 2022-06-06 DIAGNOSIS — Z76.89 ENCOUNTER FOR SUPPORT AND COORDINATION OF TRANSITION OF CARE: Primary | ICD-10-CM

## 2022-06-06 DIAGNOSIS — N30.00 ACUTE CYSTITIS WITHOUT HEMATURIA: ICD-10-CM

## 2022-06-06 DIAGNOSIS — R63.8 DECREASED ORAL INTAKE: ICD-10-CM

## 2022-06-06 DIAGNOSIS — I10 ESSENTIAL HYPERTENSION: ICD-10-CM

## 2022-06-06 DIAGNOSIS — R44.3 HALLUCINATIONS: ICD-10-CM

## 2022-06-06 DIAGNOSIS — E83.42 HYPOMAGNESEMIA: ICD-10-CM

## 2022-06-06 DIAGNOSIS — E87.8 ELECTROLYTE ABNORMALITY: ICD-10-CM

## 2022-06-06 DIAGNOSIS — F41.9 ANXIETY AND DEPRESSION: Primary | ICD-10-CM

## 2022-06-06 DIAGNOSIS — F32.A ANXIETY AND DEPRESSION: ICD-10-CM

## 2022-06-06 PROBLEM — N17.9 ACUTE KIDNEY INJURY (HCC): Status: RESOLVED | Noted: 2022-03-28 | Resolved: 2022-06-06

## 2022-06-06 PROCEDURE — 99496 TRANSJ CARE MGMT HIGH F2F 7D: CPT | Performed by: NURSE PRACTITIONER

## 2022-06-06 RX ORDER — AMLODIPINE BESYLATE 5 MG/1
5 TABLET ORAL DAILY
Qty: 90 TABLET | Refills: 1 | Status: SHIPPED | OUTPATIENT
Start: 2022-06-06

## 2022-06-06 NOTE — LETTER
June 6, 2022     Patient: Nora Reyna  YOB: 1960  Date of Visit: 6/6/2022      To Whom it May Concern:    Nora Reyna is under my professional care  Calvin Hartley was seen in my office on 6/6/2022  Calvin Hartley may return to work on 6/6/2022  If you have any questions or concerns, please don't hesitate to call           Sincerely,          LETTY Piña        CC: No Recipients

## 2022-06-06 NOTE — PROGRESS NOTES
Transition of Care  Follow-up After Hospitalization    Jeremiah Graham 63 y o  female   Date:  6/6/2022    TCM Call (since 5/6/2022)     Date and time call was made  6/2/2022  1:54 PM    Hospital care reviewed  Records reviewed    Patient was hospitialized at  [address]    Date of Admission  05/31/22    Date of discharge  06/02/22    Diagnosis  Hallucinations    Disposition  Home      TCM Call (since 5/6/2022)     Should patient be enrolled in anticoag monitoring? No    Scheduled for follow up? Yes    Did you obtain your prescribed medications  Yes    Do you need help managing your prescriptions or medications  No    Is transportation to your appointment needed  No    I have advised the patient to call PCP with any new or worsening symptoms  Yanely Andrukaitis RMA    Living Arrangements  Spouse or Significiant other    Are you recieving any outpatient services  No    Are you recieving home care services  No    Are you using any community resources  No    Current waiver services  No    Have you fallen in the last 12 months  No    Interperter language line needed  No    Counseling  Patient          Hospital records were reviewed  Medications upon discharge reviewed/updated  Medication Changes: amlodipine, zyprexa  Imaging: CTH, CXR  Consults: Psychiatry  Follow up visits with other specialists: psychiatry - appointment is ROSE Reis was scheduled 6/16 but [facility] canceled      Assessment and Plan:    Verner Southward was seen today for transition of care management  Diagnoses and all orders for this visit:    Encounter for support and coordination of transition of care    Hallucinations    Electrolyte abnormality  -     Comprehensive metabolic panel; Future    Decreased oral intake    Essential hypertension  -     amLODIPine (NORVASC) 5 mg tablet;  Take 1 tablet (5 mg total) by mouth daily    Thrush, oral    Overweight with body mass index (BMI) of 27 to 27 9 in adult    Anxiety and depression    Acute cystitis without hematuria    Hypomagnesemia    Hypokalemia  -     Comprehensive metabolic panel; Future    Sore mouth            Josias Bearden present for TCM visit s/p hospitalization for hallucinations, AMS, electrolyte abnormality, HTN, UTI, and decreased PO intake 2/2 thrust  Pt was treated with IVABx and fluids  She was seen by psychiatry and started on Zyprexa  ID was consulted  Pt continued with fluconazole for thrush  Pt reports that psychiatry advised her that they would call her and set up future appointments  Pt khan snot know when this will be  ROS: Review of Systems   Constitutional: Negative  HENT: Negative  Eyes: Negative  Respiratory: Negative  Cardiovascular: Negative  Gastrointestinal: Negative  Endocrine: Negative  Genitourinary: Negative  Musculoskeletal: Negative  Skin: Negative  Allergic/Immunologic: Negative  Neurological: Negative  Hematological: Negative  Psychiatric/Behavioral: Negative          Past Medical History:   Diagnosis Date    Acute cystitis without hematuria     Acute cystitis without hematuria 2022    [facility] [address] ) 2022    hospital admission    Acute kidney injury ([facility] [address] ) 3/28/2022    Anxiety     Bradycardia     Depression     Gastroenteritis     [facility]     History of blood transfusion     History of depression     Hypertension     Macrocytic anemia 2022    hospital admission    Past heart attack 2006       Past Surgical History:   Procedure Laterality Date    BREAST CYST EXCISION Right   benign    BREAST SURGERY Right     calcifications      SECTION  ,     CHOLECYSTECTOMY      GASTRIC BYPASS      OOPHORECTOMY Right        Social History     Socioeconomic History    Marital status: /Civil Union     Spouse name: None    Number of children: None    Years of education: None    Highest education level: None   Occupational History Employer: [employer]   Tobacco Use    Smoking status: Never Smoker    Smokeless tobacco: Never Used   Vaping Use    Vaping Use: Never used   Substance and Sexual Activity    Alcohol use: Yes     Alcohol/week: 3 0 standard drinks     Types: 3 Shots of liquor per week    Drug use: Never    Sexual activity: Yes     Partners: Male   Other Topics Concern    None   Social History Narrative    None     Social Determinants of Health     Financial Resource Strain: Not on file   Food Insecurity: No Food Insecurity    Worried About Running Out of Food in the Last Year: Never true    Mark of Food in the Last Year: Never true   Transportation Needs: No Transportation Needs    Lack of Transportation (Medical): No    Lack of Transportation (Non-Medical):  No   Physical Activity: Not on file   Stress: Not on file   Social Connections: Not on file   Intimate Partner Violence: Not on file   Housing Stability: Low Risk     Unable to Pay for Housing in the Last Year: No    Number of Places Lived in the Last Year: 1    Unstable Housing in the Last Year: No       Family History   Problem Relation Age of Onset    Diabetes Mother     Heart attack Father     Diabetes Sister     Substance Abuse Brother     Diabetes Brother     No Known Problems Maternal Grandmother     Breast cancer Paternal Grandmother     No Known Problems Sister     No Known Problems Sister     No Known Problems Maternal Aunt     No Known Problems Maternal Aunt     No Known Problems Maternal Aunt     No Known Problems Paternal Aunt     No Known Problems Paternal Aunt     No Known Problems Paternal Aunt        Allergies   Allergen Reactions    Oxycodone-Acetaminophen Hives     per t-system      Propoxyphene Hives    Shellfish Allergy - Food Allergy          Current Outpatient Medications:     amLODIPine (NORVASC) 5 mg tablet, Take 1 tablet (5 mg total) by mouth daily, Disp: 90 tablet, Rfl: 1    busPIRone (BUSPAR) 10 mg tablet, Take 1 tablet (10 mg total) by mouth 2 (two) times a day (Patient taking differently: Take 10 mg by mouth in the morning), Disp: 180 tablet, Rfl: 1    diphenhydrAMINE (BENADRYL) 25 mg capsule, Take 25 mg by mouth every 6 (six) hours as needed for itching  , Disp: , Rfl:     DULoxetine (CYMBALTA) 20 mg capsule, Take 1 capsule (20 mg total) by mouth daily, Disp: 30 capsule, Rfl: 1    OLANZapine (ZyPREXA) 5 mg tablet, Take 1 tablet (5 mg total) by mouth daily at bedtime, Disp: 30 tablet, Rfl: 0    estradiol (ESTRACE) 0 1 mg/g vaginal cream, Insert 1 g into the vagina 3 (three) times a week Apply a pea sized amount to external vaginal opening and urethra area twice weekly, Disp: 42 5 g, Rfl: 3    fluticasone (FLONASE) 50 mcg/act nasal spray, 2 sprays into each nostril daily (Patient not taking: Reported on 6/6/2022), Disp: 16 g, Rfl: 0    folic acid (FOLVITE) 1 mg tablet, Take 1 tablet (1 mg total) by mouth daily, Disp: 30 tablet, Rfl: 0    metoprolol succinate (TOPROL-XL) 50 mg 24 hr tablet, Take 1 tablet (50 mg total) by mouth daily, Disp: 30 tablet, Rfl: 1    polyethylene glycol (MiraLax) 17 GM/SCOOP powder, Take 238 g by mouth once for 1 dose Take 238 g my mouth  Take first half at 5pm, Take second half at 6am, Disp: 238 g, Rfl: 0    potassium chloride (K-DUR,KLOR-CON) 10 mEq tablet, Take 1 tablet (10 mEq total) by mouth in the morning and 1 tablet (10 mEq total) in the evening  Do all this for 7 days  , Disp: 20 tablet, Rfl: 0      Physical Exam:  /78 (BP Location: Left arm, Patient Position: Sitting, Cuff Size: Large)   Pulse 68   Temp (!) 97 4 °F (36 3 °C) (Tympanic)   Ht 5' (1 524 m)   Wt 64 9 kg (143 lb)   BMI 27 93 kg/m²     Physical Exam  Vitals and nursing note reviewed  Constitutional:       Appearance: Normal appearance  She is well-developed  Interventions: Face mask in place  HENT:      Head: Normocephalic and atraumatic        Right Ear: Tympanic membrane, ear canal and external ear normal       Left Ear: Tympanic membrane, ear canal and external ear normal       Nose: Nose normal       Mouth/Throat:      Mouth: Mucous membranes are moist       Pharynx: Uvula midline  Eyes:      General: Lids are normal       Conjunctiva/sclera: Conjunctivae normal       Pupils: Pupils are equal, round, and reactive to light  Neck:      Thyroid: No thyroid mass or thyromegaly  Vascular: No JVD  Trachea: Trachea and phonation normal    Cardiovascular:      Rate and Rhythm: Normal rate and regular rhythm  Pulses: Normal pulses  Heart sounds: Normal heart sounds, S1 normal and S2 normal  No murmur heard  No friction rub  No gallop  Pulmonary:      Effort: Pulmonary effort is normal       Breath sounds: Normal breath sounds  Abdominal:      General: Bowel sounds are normal       Palpations: Abdomen is soft  Tenderness: There is no abdominal tenderness  Genitourinary:     Comments: Deferred   Musculoskeletal:         General: Normal range of motion  Cervical back: Full passive range of motion without pain, normal range of motion and neck supple  Right lower leg: No edema  Left lower leg: No edema  Lymphadenopathy:      Head:      Right side of head: No submental, submandibular, tonsillar, preauricular, posterior auricular or occipital adenopathy  Left side of head: No submental, submandibular, tonsillar, preauricular, posterior auricular or occipital adenopathy  Cervical: No cervical adenopathy  Skin:     General: Skin is warm and dry  Capillary Refill: Capillary refill takes less than 2 seconds  Neurological:      General: No focal deficit present  Mental Status: She is alert and oriented to person, place, and time  Cranial Nerves: Cranial nerves are intact  No cranial nerve deficit  Sensory: Sensation is intact  Motor: Motor function is intact  Coordination: Coordination is intact  Gait: Gait is intact  Deep Tendon Reflexes: Reflexes are normal and symmetric  Psychiatric:         Attention and Perception: Attention and perception normal          Mood and Affect: Mood and affect normal          Speech: Speech normal          Behavior: Behavior normal  Behavior is cooperative  Thought Content:  Thought content normal          Cognition and Memory: Cognition normal          Judgment: Judgment normal              Labs:  Lab Results   Component Value Date    WBC 9 14 06/02/2022    HGB 8 7 (L) 06/02/2022    HCT 27 8 (L) 06/02/2022     (H) 06/02/2022     06/02/2022     Lab Results   Component Value Date    K 3 1 (L) 06/02/2022    CL 99 06/02/2022    CO2 23 06/02/2022    BUN 16 06/02/2022    CREATININE 0 76 06/02/2022    GLUF 114 (H) 12/23/2020    CALCIUM 8 7 06/02/2022    CORRECTEDCA 9 7 06/01/2022    AST 16 06/01/2022    ALT 12 06/01/2022    ALKPHOS 95 06/01/2022    EGFR 85 06/02/2022

## 2022-06-06 NOTE — PROGRESS NOTES
Pt with multiple er and hospitalizations recently and no insurance - will put social work referral in chart to help pt

## 2022-06-07 LAB — BACTERIA UR CULT: NORMAL

## 2022-06-08 ENCOUNTER — PATIENT OUTREACH (OUTPATIENT)
Dept: FAMILY MEDICINE CLINIC | Facility: CLINIC | Age: 62
End: 2022-06-08

## 2022-06-08 NOTE — PROGRESS NOTES
OPCM  is responding to consult from Provider regarding patient not having any insurance and having multiple ED visits and hospitalizations  Telephone call placed to patient and I left a message on her VM with my contact information and requested a return call so that I can provide needed assistance

## 2022-06-13 ENCOUNTER — TELEPHONE (OUTPATIENT)
Dept: OTHER | Facility: OTHER | Age: 62
End: 2022-06-13

## 2022-06-16 ENCOUNTER — PATIENT OUTREACH (OUTPATIENT)
Dept: FAMILY MEDICINE CLINIC | Facility: CLINIC | Age: 62
End: 2022-06-16

## 2022-06-16 NOTE — PROGRESS NOTES
[facility] reviewed chart  Referral received from patient's PCP's MA due to patient having multiple ER visits and no insurance  Covering [facility] attempted initial outreach  [facility]IR outreached patient ([phone])  Patient did not answer  [facility]IR was unable to leave a message as voicemail box is full  Unable to reach letter will be sent  [facility] will close referral and remain available for any future needs

## 2022-06-16 NOTE — LETTER
[address] [place] [id number]    Re: Senia Inna to Contact   6/16/2022       Dear Kayley Ramirez,    I am a [address]  and wanted to be certain you had information to contact me should you desire assistance with or have questions about non-medical aspects of your care such as [but not limited to] medical insurance, housing, transportation, material needs, or emergency needs, as I have been unable to contact you  If I do not have an answer I will assist you in finding the appropriate agency or individual who can help  Please feel free to contact me at [phone]  Thank You      Sincerely,         MICHELLE MontalvoW

## 2025-04-16 NOTE — ASSESSMENT & PLAN NOTE
· Urinalysis positive for leukocyte esterase, leukocytes, and bacteria  · Treated with ceftriaxone in ER-- completed 3 day course  · Urine culture- E coli no